# Patient Record
Sex: MALE | Race: WHITE | NOT HISPANIC OR LATINO | Employment: OTHER | URBAN - METROPOLITAN AREA
[De-identification: names, ages, dates, MRNs, and addresses within clinical notes are randomized per-mention and may not be internally consistent; named-entity substitution may affect disease eponyms.]

---

## 2017-01-05 ENCOUNTER — OUTSIDE FACILITY SERVICE (OUTPATIENT)
Dept: GASTROENTEROLOGY | Facility: CLINIC | Age: 47
End: 2017-01-05

## 2017-01-05 ENCOUNTER — LAB REQUISITION (OUTPATIENT)
Dept: LAB | Facility: HOSPITAL | Age: 47
End: 2017-01-05

## 2017-01-05 DIAGNOSIS — D12.2 BENIGN NEOPLASM OF ASCENDING COLON: ICD-10-CM

## 2017-01-05 PROCEDURE — 45385 COLONOSCOPY W/LESION REMOVAL: CPT | Performed by: INTERNAL MEDICINE

## 2017-01-05 PROCEDURE — 88305 TISSUE EXAM BY PATHOLOGIST: CPT | Performed by: INTERNAL MEDICINE

## 2017-01-06 LAB
CYTO UR: NORMAL
LAB AP CASE REPORT: NORMAL
LAB AP CLINICAL INFORMATION: NORMAL
Lab: NORMAL
PATH REPORT.FINAL DX SPEC: NORMAL
PATH REPORT.GROSS SPEC: NORMAL

## 2017-05-25 ENCOUNTER — CLINICAL SUPPORT NO REQUIREMENTS (OUTPATIENT)
Dept: CARDIOLOGY | Facility: CLINIC | Age: 47
End: 2017-05-25

## 2017-05-25 DIAGNOSIS — I42.8 NONISCHEMIC CARDIOMYOPATHY (HCC): ICD-10-CM

## 2017-05-25 PROCEDURE — 93295 DEV INTERROG REMOTE 1/2/MLT: CPT | Performed by: PHYSICIAN ASSISTANT

## 2017-05-25 PROCEDURE — 93296 REM INTERROG EVL PM/IDS: CPT | Performed by: PHYSICIAN ASSISTANT

## 2017-08-21 ENCOUNTER — OFFICE VISIT (OUTPATIENT)
Dept: CARDIOLOGY | Facility: CLINIC | Age: 47
End: 2017-08-21

## 2017-08-21 VITALS
HEIGHT: 71 IN | DIASTOLIC BLOOD PRESSURE: 78 MMHG | WEIGHT: 254.8 LBS | BODY MASS INDEX: 35.67 KG/M2 | HEART RATE: 71 BPM | SYSTOLIC BLOOD PRESSURE: 108 MMHG

## 2017-08-21 DIAGNOSIS — I42.8 NONISCHEMIC CARDIOMYOPATHY (HCC): Primary | ICD-10-CM

## 2017-08-21 PROCEDURE — 93289 INTERROG DEVICE EVAL HEART: CPT | Performed by: PHYSICIAN ASSISTANT

## 2017-08-21 RX ORDER — LEVOCETIRIZINE DIHYDROCHLORIDE 5 MG/1
5 TABLET, FILM COATED ORAL EVERY EVENING
COMMUNITY

## 2017-08-21 RX ORDER — MONTELUKAST SODIUM 10 MG/1
10 TABLET ORAL NIGHTLY
COMMUNITY

## 2017-08-21 RX ORDER — CARVEDILOL 25 MG/1
12.5 TABLET ORAL 2 TIMES DAILY WITH MEALS
COMMUNITY
End: 2020-08-11 | Stop reason: SDUPTHER

## 2017-08-21 RX ORDER — LISINOPRIL 20 MG/1
20 TABLET ORAL DAILY
COMMUNITY
End: 2018-08-06

## 2017-08-21 NOTE — PROGRESS NOTES
[facility] - Office Note  David Ravi         [address] [place] [place] [address]  1970   [phone] (home) [phone] (work)     LOCATION:  [facility].  Visit Type: Follow Up.    PCP:  Jayant Newton MD    08/21/17   David Ravi is a 47 y.o.  male  currently employed.      Chief Complaint: Follow-up on cardiomyopathy with ICD interrogation  PROBLEM LIST:  1. Nonischemic cardiomyopathy:  a.  Left heart catheterization, circa 2000 by Rohan Bautista MD, showed normal coronary arteries.  b. Echocardiogram, 08/20/2015, showed moderate global hypokinesis, ejection fraction of 25% to 30%.  c. GXT myocardial perfusion, 08/25/2015, showed dilated  LV; no reversible ischemia.   d. Left heart catheterization, 09/08/2015, Dr. Rohan Bautista: Normal coronary arteries, EF 30%. Placement of LifeVest.  2.  Class I NYHA CHF. Status post Guidant ICD implantation, November 2015.    3. Obesity, BMI 35.   4. Obstructive sleep apnea with CPAP.   5. Asthma.   6. GERD.   7. History of vertigo.      Allergies   Allergen Reactions   • Amoxicillin          Current Outpatient Prescriptions:   •  carvedilol (COREG) 25 MG tablet, Take 25 mg by mouth 2 (Two) Times a Day With Meals., Disp: , Rfl:   •  levocetirizine (XYZAL) 5 MG tablet, Take 5 mg by mouth Every Evening., Disp: , Rfl:   •  lisinopril (PRINIVIL,ZESTRIL) 20 MG tablet, Take 20 mg by mouth Daily., Disp: , Rfl:   •  montelukast (SINGULAIR) 10 MG tablet, Take 10 mg by mouth Every Night., Disp: , Rfl:     HPI  Here for device check.  Doing well overall.  Tolerating medications without side effects and compliant with follow-up with his primary care.  He has an occasional complaint of dyspnea and fatigue but he does walk on a daily basis getting at least 10,000 steps in routinely.      The following portions of the patient's history were reviewed in the chart and updated as appropriate: allergies, current medications, past  "family history, past medical history, past social history, past surgical history and problem list.    Review of Systems   Constitution: Negative for weakness and malaise/fatigue.   Cardiovascular: Positive for dyspnea on exertion.   All other systems reviewed and are negative.            height is 71\" (180.3 cm) and weight is 254 lb 12.8 oz (116 kg). His blood pressure is 108/78 and his pulse is 71.   Physical Exam   Constitutional: Vital signs are normal. He appears well-developed and well-nourished.   Cardiovascular: Normal rate, regular rhythm, S1 normal, S2 normal, normal heart sounds, intact distal pulses and normal pulses.    Pulmonary/Chest: Effort normal and breath sounds normal. He has no wheezes. He has no rhonchi. He has no rales.   Abdominal: Soft. Normal appearance and bowel sounds are normal. There is no hepatosplenomegaly.   Neurological: He is alert.         Procedures   [employer] single-chamber ICD:  Ventricular R wave 12.3, threshold 0.9, impedance 645 ohms.  Battery voltage is at 12 years.  Charge time 10.2 seconds.  One episode of VF October 13, 2016, 11 beats.  No therapy performed  Assessment/ Plan   Nonischemic cardiomyopathy:  Stable and well compensated.  Continue medications.  Normal device check.  Return to clinic 8 months with repeat [employer] interrogation.      Dee Dee Bates PA-C  8/21/2017 2:00 PM      EMR Dragon/Transcription disclaimer:   Much of this encounter note is an electronic transcription/translation of spoken language to printed text. The electronic translation of spoken language may permit erroneous, or at times, nonsensical words or phrases to be inadvertently transcribed; Although I have reviewed the note for such errors, some may still exist.      "

## 2018-01-25 ENCOUNTER — CLINICAL SUPPORT NO REQUIREMENTS (OUTPATIENT)
Dept: CARDIOLOGY | Facility: CLINIC | Age: 48
End: 2018-01-25

## 2018-01-25 DIAGNOSIS — I42.8 NONISCHEMIC CARDIOMYOPATHY (HCC): ICD-10-CM

## 2018-01-25 PROCEDURE — 93295 DEV INTERROG REMOTE 1/2/MLT: CPT | Performed by: PHYSICIAN ASSISTANT

## 2018-01-25 PROCEDURE — 93296 REM INTERROG EVL PM/IDS: CPT | Performed by: PHYSICIAN ASSISTANT

## 2018-08-06 ENCOUNTER — OFFICE VISIT (OUTPATIENT)
Dept: CARDIOLOGY | Facility: CLINIC | Age: 48
End: 2018-08-06

## 2018-08-06 VITALS
DIASTOLIC BLOOD PRESSURE: 60 MMHG | WEIGHT: 246 LBS | HEART RATE: 81 BPM | SYSTOLIC BLOOD PRESSURE: 110 MMHG | BODY MASS INDEX: 34.44 KG/M2 | HEIGHT: 71 IN

## 2018-08-06 DIAGNOSIS — I42.8 NONISCHEMIC CARDIOMYOPATHY (HCC): Primary | ICD-10-CM

## 2018-08-06 PROCEDURE — 93282 PRGRMG EVAL IMPLANTABLE DFB: CPT | Performed by: PHYSICIAN ASSISTANT

## 2018-08-06 PROCEDURE — 99213 OFFICE O/P EST LOW 20 MIN: CPT | Performed by: PHYSICIAN ASSISTANT

## 2018-08-06 NOTE — PROGRESS NOTES
[facility]   OFFICE NOTE      David Ravi  1970  PCP: Jayant Newton MD    SUBJECTIVE:   David Ravi is a 48 y.o. male seen for a follow up visit regarding the following: NICM, SHF, ICD    CC:NICM  PROBLEM LIST:  1. Nonischemic cardiomyopathy:  a.  Left heart catheterization, circa 2000 by Rohan Bautista MD, showed normal coronary arteries.  b. Echocardiogram, 08/20/2015, showed moderate global hypokinesis, ejection fraction of 25% to 30%.  c. GXT myocardial perfusion, 08/25/2015, showed dilated  LV; no reversible ischemia.   d. Left heart catheterization, 09/08/2015, Dr. Rohan Bautista: Normal coronary arteries, EF 30%. Placement of LifeVest.  2.  Class I NYHA CHF. Status post Guidant ICD implantation, November 2015.    3. Obesity, BMI 35.   4. Obstructive sleep apnea with CPAP.   5. Asthma.   6. GERD.   7. History of vertigo.  HPI:   Today I saw Mr. Abdullahi for routine follow-up regarding history of nonischemic myopathy, congestive heart failure, and [employer] ICD.  Mr. Abdullahi states that since last visit with her office he has not had any hospitalizations.  He does try to walk over 1000 feet a day.  He denies any worsening chest pain, shortness of breath, or heart failure symptoms.  He denies any palpitations, near-syncope, or sig be events, or ICD shocks.  He reports that he feels he is overall doing well.  He follows Dr. Newton on a regular basis is scheduled to see him tomorrow.        ROS:  Review of Symptoms:  General: no recent weight loss/gain, weakness or fatigue  Skin: no rashes, lumps, or other skin changes  HEENT: no dizziness, lightheadedness, or vision changes  Respiratory: no cough or hemoptysis  Cardiovascular: no palpitations, and tachycardia  Gastrointestinal: no black/tarry stools or diarrhea  Urinary: no change in frequency or urgency  Peripheral Vascular: no claudication or leg cramps  Musculoskeletal: no muscle or joint  pain/stiffness  Psychiatric: no depression or excessive stress  Neurological: no sensory or motor loss, no syncope  Hematologic: no anemia, easy bruising or bleeding  Endocrine: no thyroid problems, nor heat or cold intolerance    Cardiac PMH: (Old records have been reviewed and summarized below)      Past Medical History, Past Surgical History, Family history, Social History, and Medications were all reviewed with the patient today and updated as necessary.         Allergies   Allergen Reactions   • Amoxicillin      Patient Active Problem List   Diagnosis   • Nonischemic cardiomyopathy (CMS/HCC)   • CHF NYHA class I (CMS/HCC)   • Obesity   • IRINA on CPAP   • Asthma   • GERD (gastroesophageal reflux disease)     Past Medical History:   Diagnosis Date   • Asthma    • CHF NYHA class I (CMS/HCC)    • GERD (gastroesophageal reflux disease)    • History of vertigo    • Nonischemic cardiomyopathy (CMS/HCC)    • Obesity    • IRINA on CPAP      No past surgical history on file.  No family history on file.  Social History   Substance Use Topics   • Smoking status: Never Smoker   • Smokeless tobacco: Never Used   • Alcohol use No         PHYSICAL EXAM:    There were no vitals taken for this visit.       Wt Readings from Last 5 Encounters:   08/21/17 116 kg (254 lb 12.8 oz)       BP Readings from Last 5 Encounters:   08/21/17 108/78   02/17/16 110/70       General appearance - Alert, well appearing, and in no distress   Mental status - Affect appropriate to mood.  Eyes - Sclerae anicteric,  ENMT - Hearing grossly normal bilaterally, Dental hygiene good.  Neck - Carotids upstroke normal bilaterally, no bruits, no JVD.  Resp - Clear to auscultation, no wheezes, rales or rhonchi, symmetric air entry.  Heart - Normal rate, regular rhythm, normal S1, S2, no murmurs, rubs, clicks or gallops.  GI - Soft, nontender, nondistended, no masses or organomegaly.  Neurological - Grossly intact - normal speech, no focal findings  Musculoskeletal  - No joint tenderness, deformity or swelling, no muscular tenderness noted.  Extremities - Peripheral pulses normal, tracepedal edema, no clubbing or cyanosis.  Skin - Normal coloration and turgor.  Psych -  oriented to person, place, and time.    Medical problems and test results were reviewed with the patient today.       Device Interrogation:  Single-chamber [employer] ICD.  RV paced less than 1%.  R-wave 12 mV.  RV threshold 0.9 V at 0.5 ms.  RV impedance 535ohms.  Shock impedance 68 ohms.  Battery voltage > 12 years remaining.  Charge time 10.4 seconds.  No significant arrhythmias.      ASSESSMENT   1. NICM: Continue BB, Entresto  2. Chronic SHF:   Monitor daily weights, fluid intake.  3. BSC ICD: Normal function.     PLAN  · Continue medical therapy  · Return for follow up in one year or sooner as needed.     8/6/2018  10:15 AM    Will Jose COLLAZO

## 2018-10-04 ENCOUNTER — CLINICAL SUPPORT NO REQUIREMENTS (OUTPATIENT)
Dept: CARDIOLOGY | Facility: CLINIC | Age: 48
End: 2018-10-04

## 2018-10-04 DIAGNOSIS — I42.8 NONISCHEMIC CARDIOMYOPATHY (HCC): ICD-10-CM

## 2018-10-04 PROCEDURE — 93296 REM INTERROG EVL PM/IDS: CPT | Performed by: INTERNAL MEDICINE

## 2018-10-04 PROCEDURE — 93295 DEV INTERROG REMOTE 1/2/MLT: CPT | Performed by: INTERNAL MEDICINE

## 2019-01-03 ENCOUNTER — CLINICAL SUPPORT NO REQUIREMENTS (OUTPATIENT)
Dept: CARDIOLOGY | Facility: CLINIC | Age: 49
End: 2019-01-03

## 2019-01-03 DIAGNOSIS — I42.8 NONISCHEMIC CARDIOMYOPATHY (HCC): ICD-10-CM

## 2019-01-03 PROCEDURE — 93295 DEV INTERROG REMOTE 1/2/MLT: CPT | Performed by: INTERNAL MEDICINE

## 2019-01-03 PROCEDURE — 93296 REM INTERROG EVL PM/IDS: CPT | Performed by: INTERNAL MEDICINE

## 2019-04-15 ENCOUNTER — CLINICAL SUPPORT NO REQUIREMENTS (OUTPATIENT)
Dept: CARDIOLOGY | Facility: CLINIC | Age: 49
End: 2019-04-15

## 2019-04-15 DIAGNOSIS — I42.8 NONISCHEMIC CARDIOMYOPATHY (HCC): ICD-10-CM

## 2019-04-15 PROCEDURE — 93295 DEV INTERROG REMOTE 1/2/MLT: CPT | Performed by: INTERNAL MEDICINE

## 2019-04-15 PROCEDURE — 93296 REM INTERROG EVL PM/IDS: CPT | Performed by: INTERNAL MEDICINE

## 2019-07-17 ENCOUNTER — CLINICAL SUPPORT NO REQUIREMENTS (OUTPATIENT)
Dept: CARDIOLOGY | Facility: CLINIC | Age: 49
End: 2019-07-17

## 2019-07-17 DIAGNOSIS — I42.8 NONISCHEMIC CARDIOMYOPATHY (HCC): Primary | ICD-10-CM

## 2019-07-17 PROCEDURE — 93296 REM INTERROG EVL PM/IDS: CPT | Performed by: INTERNAL MEDICINE

## 2019-07-17 PROCEDURE — 93295 DEV INTERROG REMOTE 1/2/MLT: CPT | Performed by: INTERNAL MEDICINE

## 2019-08-06 ENCOUNTER — OFFICE VISIT (OUTPATIENT)
Dept: CARDIOLOGY | Facility: CLINIC | Age: 49
End: 2019-08-06

## 2019-08-06 VITALS
WEIGHT: 239 LBS | HEART RATE: 65 BPM | OXYGEN SATURATION: 96 % | SYSTOLIC BLOOD PRESSURE: 110 MMHG | DIASTOLIC BLOOD PRESSURE: 60 MMHG | HEIGHT: 71 IN | BODY MASS INDEX: 33.46 KG/M2

## 2019-08-06 DIAGNOSIS — Z99.89 OSA ON CPAP: ICD-10-CM

## 2019-08-06 DIAGNOSIS — R41.3 MEMORY LOSS: Primary | ICD-10-CM

## 2019-08-06 DIAGNOSIS — I50.20 SYSTOLIC CONGESTIVE HEART FAILURE, NYHA CLASS 1, UNSPECIFIED CONGESTIVE HEART FAILURE CHRONICITY (HCC): ICD-10-CM

## 2019-08-06 DIAGNOSIS — I42.8 NONISCHEMIC CARDIOMYOPATHY (HCC): ICD-10-CM

## 2019-08-06 DIAGNOSIS — E66.9 CLASS 1 OBESITY WITH SERIOUS COMORBIDITY AND BODY MASS INDEX (BMI) OF 30.0 TO 30.9 IN ADULT, UNSPECIFIED OBESITY TYPE: ICD-10-CM

## 2019-08-06 DIAGNOSIS — G47.33 OSA ON CPAP: ICD-10-CM

## 2019-08-06 PROCEDURE — 99213 OFFICE O/P EST LOW 20 MIN: CPT | Performed by: NURSE PRACTITIONER

## 2019-08-06 PROCEDURE — 93282 PRGRMG EVAL IMPLANTABLE DFB: CPT | Performed by: NURSE PRACTITIONER

## 2019-08-06 RX ORDER — SACUBITRIL AND VALSARTAN 97; 103 MG/1; MG/1
1 TABLET, FILM COATED ORAL 2 TIMES DAILY
Refills: 0 | COMMUNITY
Start: 2019-05-17

## 2019-08-06 NOTE — PROGRESS NOTES
[facility]   OFFICE NOTE      David Ravi  1970  PCP: Jayant Newton MD    SUBJECTIVE:   David Ravi is a 49 y.o. male seen for a follow up visit regarding the following: NICM, SHF, ICD    CC:NICM  PROBLEM LIST:  1. Nonischemic cardiomyopathy:  a.  Left heart catheterization, circa 2000 by Rohan Bautista MD, showed normal coronary arteries.  b. Echocardiogram, 08/20/2015, showed moderate global hypokinesis, ejection fraction of 25% to 30%.  c. GXT myocardial perfusion, 08/25/2015, showed dilated  LV; no reversible ischemia.   d. Left heart catheterization, 09/08/2015, Dr. Rohan Bautista: Normal coronary arteries, EF 30%. Placement of LifeVest.  2.  Class I NYHA CHF. Status post Guidant ICD implantation, November 2015.    3. Obesity, BMI 35.   4. Obstructive sleep apnea with CPAP.   5. Asthma.   6. GERD.   7. History of vertigo.  HPI:   Mr. Abdullahi presents for routine follow-up regarding history of nonischemic cardiomyopathy, congestive heart failure, and [employer] ICD.  EF 30-35% by most recent echo around 2018. He states that he has been doing well. He reports having memory loss. Mostly short-term. No other neurologic symptoms. Requests referral to neuro.     ROS:  Denies CP, SOB, PND, YU, orthopnea, fevers, chills, night sweats, LH, dizziness.    Cardiac PMH: (Old records have been reviewed and summarized below)      Past Medical History, Past Surgical History, Family history, Social History, and Medications were all reviewed with the patient today and updated as necessary.         Allergies   Allergen Reactions   • Amoxicillin      Patient Active Problem List   Diagnosis   • Nonischemic cardiomyopathy (CMS/HCC)   • CHF NYHA class I (CMS/HCC)   • Obesity   • IRINA on CPAP   • Asthma   • GERD (gastroesophageal reflux disease)     Past Medical History:   Diagnosis Date   • Asthma    • CHF NYHA class I (CMS/HCC)    • GERD (gastroesophageal reflux  "disease)    • History of vertigo    • Nonischemic cardiomyopathy (CMS/HCC)    • Obesity    • IRINA on CPAP      History reviewed. No pertinent surgical history.  Family History   Problem Relation Age of Onset   • Heart attack Father      Social History     Tobacco Use   • Smoking status: Never Smoker   • Smokeless tobacco: Never Used   Substance Use Topics   • Alcohol use: No         PHYSICAL EXAM:    /60 (BP Location: Left arm, Patient Position: Sitting)   Pulse 65   Ht 180.3 cm (71\")   Wt 108 kg (239 lb)   SpO2 96%   BMI 33.33 kg/m²        Wt Readings from Last 5 Encounters:   08/06/19 108 kg (239 lb)   08/06/18 112 kg (246 lb)   08/21/17 116 kg (254 lb 12.8 oz)       BP Readings from Last 5 Encounters:   08/06/19 110/60   08/06/18 110/60   08/21/17 108/78   02/17/16 110/70       General appearance - Alert, well appearing, and in no distress   Mental status - Affect appropriate to mood.  Eyes - Sclerae anicteric,  ENMT - Hearing grossly normal bilaterally, Dental hygiene good.  Neck - Carotids upstroke normal bilaterally, no bruits, no JVD.  Resp - Clear to auscultation, no wheezes, rales or rhonchi, symmetric air entry.  Heart - Normal rate, regular rhythm, normal S1, S2, no murmurs, rubs, clicks or gallops.  GI - Soft, nontender, nondistended, no masses or organomegaly.  Neurological - Grossly intact - normal speech, no focal findings  Musculoskeletal - No joint tenderness, deformity or swelling, no muscular tenderness noted.  Extremities - Peripheral pulses normal, tracepedal edema, no clubbing or cyanosis.  Skin - Normal coloration and turgor.  Psych -  oriented to person, place, and time.    Medical problems and test results were reviewed with the patient today.       Device Interrogation:  Single-chamber [employer] ICD: RV <1%, normal threshold and impedance, battery life 12 years, no events.     ASSESSMENT   1. NICM: Continue BB, Entresto  2. Chronic SHF:   Monitor daily weights, fluid " intake.  3. BSC ICD: Normal function.   4. Memory loss    PLAN  · Continue medical therapy  · Return for follow up in one year or sooner as needed.  · Keep scheduled follow up with Dr. Newton.   · Referral to ambulatory neurology     CC: Dr. Newton     Electronically signed by GAGE Lai, 08/06/19, 2:17 PM.

## 2019-10-10 ENCOUNTER — TELEPHONE (OUTPATIENT)
Dept: NEUROLOGY | Facility: CLINIC | Age: 49
End: 2019-10-10

## 2019-10-10 NOTE — TELEPHONE ENCOUNTER
Pt wife Arabella called to verify and confirm pt scheduled follow-up appt tomorrow at 3:30 with Dr. Valdes. Thanks.

## 2019-10-11 ENCOUNTER — APPOINTMENT (OUTPATIENT)
Dept: LAB | Facility: HOSPITAL | Age: 49
End: 2019-10-11

## 2019-10-11 ENCOUNTER — OFFICE VISIT (OUTPATIENT)
Dept: NEUROLOGY | Facility: CLINIC | Age: 49
End: 2019-10-11

## 2019-10-11 VITALS
SYSTOLIC BLOOD PRESSURE: 102 MMHG | HEIGHT: 71 IN | WEIGHT: 242 LBS | BODY MASS INDEX: 33.88 KG/M2 | HEART RATE: 91 BPM | DIASTOLIC BLOOD PRESSURE: 70 MMHG | OXYGEN SATURATION: 95 %

## 2019-10-11 DIAGNOSIS — R41.3 MEMORY LOSS: Primary | ICD-10-CM

## 2019-10-11 LAB — AMMONIA BLD-SCNC: 64 UMOL/L (ref 16–60)

## 2019-10-11 PROCEDURE — 83825 ASSAY OF MERCURY: CPT | Performed by: PSYCHIATRY & NEUROLOGY

## 2019-10-11 PROCEDURE — 99204 OFFICE O/P NEW MOD 45 MIN: CPT | Performed by: PSYCHIATRY & NEUROLOGY

## 2019-10-11 PROCEDURE — 80053 COMPREHEN METABOLIC PANEL: CPT | Performed by: PSYCHIATRY & NEUROLOGY

## 2019-10-11 PROCEDURE — 85027 COMPLETE CBC AUTOMATED: CPT | Performed by: PSYCHIATRY & NEUROLOGY

## 2019-10-11 PROCEDURE — 82746 ASSAY OF FOLIC ACID SERUM: CPT | Performed by: PSYCHIATRY & NEUROLOGY

## 2019-10-11 PROCEDURE — 82607 VITAMIN B-12: CPT | Performed by: PSYCHIATRY & NEUROLOGY

## 2019-10-11 PROCEDURE — 82140 ASSAY OF AMMONIA: CPT | Performed by: PSYCHIATRY & NEUROLOGY

## 2019-10-11 PROCEDURE — 83655 ASSAY OF LEAD: CPT | Performed by: PSYCHIATRY & NEUROLOGY

## 2019-10-11 PROCEDURE — G0432 EIA HIV-1/HIV-2 SCREEN: HCPCS | Performed by: PSYCHIATRY & NEUROLOGY

## 2019-10-11 PROCEDURE — 84443 ASSAY THYROID STIM HORMONE: CPT | Performed by: PSYCHIATRY & NEUROLOGY

## 2019-10-11 PROCEDURE — 86618 LYME DISEASE ANTIBODY: CPT | Performed by: PSYCHIATRY & NEUROLOGY

## 2019-10-11 PROCEDURE — 86592 SYPHILIS TEST NON-TREP QUAL: CPT | Performed by: PSYCHIATRY & NEUROLOGY

## 2019-10-11 PROCEDURE — 36415 COLL VENOUS BLD VENIPUNCTURE: CPT | Performed by: PSYCHIATRY & NEUROLOGY

## 2019-10-11 PROCEDURE — 82175 ASSAY OF ARSENIC: CPT | Performed by: PSYCHIATRY & NEUROLOGY

## 2019-10-11 RX ORDER — LORATADINE 10 MG/1
650 TABLET ORAL 2 TIMES DAILY PRN
Refills: 0 | COMMUNITY
Start: 2019-09-23 | End: 2021-03-02

## 2019-10-11 RX ORDER — CYCLOBENZAPRINE HCL 10 MG
10 TABLET ORAL AS NEEDED
Refills: 0 | COMMUNITY
Start: 2019-09-23 | End: 2021-12-21

## 2019-10-11 NOTE — PROGRESS NOTES
"Subjective     CC: Memory Loss    History of Present Illness   David Ravi is a 49 y.o. male who comes to clinic today for evaluation of memory impairment. He has had symptoms since approximately 2014. He initially noted difficulty recalling previously learned information. Over time he has also noted impairments in recent memory. He has had associated impairments in orientation, attention and language. He feels that his symptoms worsened significantly after starting Entresto in approximately 2017. There are not other identified exacerbating or alleviating factors.    He is on CPAP for IRINA.    It is also noted that he is on disability due to cardiomyopathy. His LVEF=30% on 9/9/15 (by cardiac cath).    I have reviewed and confirmed the past family, social and medical history as accurate on 10/11/19.     Review of Systems   Constitutional: Positive for fatigue.   Respiratory: Positive for shortness of breath.    Cardiovascular: Negative.    Gastrointestinal: Negative.    Genitourinary: Negative.    Musculoskeletal: Negative.    Psychiatric/Behavioral: Negative for dysphoric mood.   All other systems reviewed and are negative.      Objective   General appearance today is normal.   Peripheral pulses were present and symmetric.   The ophthalmoscopic exam today is unremarkable. The discs and posterior elements are unremarkable.    /70   Pulse 91   Ht 180.3 cm (71\")   Wt 110 kg (242 lb)   SpO2 95%   BMI 33.75 kg/m²     Physical Exam   Constitutional: He is oriented to person, place, and time.   Neurological: He is oriented to person, place, and time. He has normal strength. He has a normal Finger-Nose-Finger Test. Gait normal.   Reflex Scores:       Tricep reflexes are 1+ on the right side and 1+ on the left side.       Bicep reflexes are 1+ on the right side and 1+ on the left side.       Brachioradialis reflexes are 1+ on the right side and 1+ on the left side.  Psychiatric: His speech is normal.      "   Neurologic Exam     Mental Status   Oriented to person, place, and time.   Registration: recalls 3 of 3 objects. Recall at 5 minutes: recalls 3 of 3 objects. Follows 3 step commands.   Attention: normal.   Speech: speech is normal   Level of consciousness: alert  Knowledge: good.   Able to name object. Able to read. Able to repeat. Able to write. Normal comprehension.     Cranial Nerves   Cranial nerves II through XII intact.     Motor Exam   Muscle bulk: normal  Overall muscle tone: normal    Strength   Strength 5/5 throughout.     Sensory Exam   Light touch normal.     Gait, Coordination, and Reflexes     Gait  Gait: normal    Coordination   Finger to nose coordination: normal    Reflexes   Right brachioradialis: 1+  Left brachioradialis: 1+  Right biceps: 1+  Left biceps: 1+  Right triceps: 1+  Left triceps: 1+      MMSE=30  MoCA=29 (4/5 free recall, 5/5 cue recall)      Assessment/Plan   David was seen today for memory loss.    Diagnoses and all orders for this visit:    Memory loss  -     Ammonia  -     CBC (No Diff)  -     B. Burgdorferi Antibodies, WB Reflex  -     Comprehensive Metabolic Panel  -     CT Head Without Contrast  -     Vitamin B12  -     TSH  -     RPR  -     HIV-1 / O / 2 Ag / Antibody 4th Generation  -     Heavy Metals, Blood  -     Folate          DISCUSSION/SUMMARY    David Ravi comes to clinic today for evaluation of memory impairment. His history and examination, including bedside cognitive testing are reassuring, and it is possible that his symptoms are related to his other comorbid medical conditions which I discussed. MCI is possible, though his bedside testing does also make this less likely. For now it was elected to obtain screening blood work  and a head CT . If these are normal, then I did discuss neuropsychological testing, which he will consider.    As part of this visit I obtained additional history from the family which is incorporated in the HPI. Please see above for  additional details.

## 2019-10-12 LAB
ALBUMIN SERPL-MCNC: 4.8 G/DL (ref 3.5–5.2)
ALBUMIN/GLOB SERPL: 1.8 G/DL
ALP SERPL-CCNC: 69 U/L (ref 39–117)
ALT SERPL W P-5'-P-CCNC: 28 U/L (ref 1–41)
ANION GAP SERPL CALCULATED.3IONS-SCNC: 12.3 MMOL/L (ref 5–15)
AST SERPL-CCNC: 25 U/L (ref 1–40)
BILIRUB SERPL-MCNC: 0.7 MG/DL (ref 0.2–1.2)
BUN BLD-MCNC: 9 MG/DL (ref 6–20)
BUN/CREAT SERPL: 10 (ref 7–25)
CALCIUM SPEC-SCNC: 9.2 MG/DL (ref 8.6–10.5)
CHLORIDE SERPL-SCNC: 99 MMOL/L (ref 98–107)
CO2 SERPL-SCNC: 27.7 MMOL/L (ref 22–29)
CREAT BLD-MCNC: 0.9 MG/DL (ref 0.76–1.27)
DEPRECATED RDW RBC AUTO: 40.1 FL (ref 37–54)
ERYTHROCYTE [DISTWIDTH] IN BLOOD BY AUTOMATED COUNT: 12.7 % (ref 12.3–15.4)
FOLATE SERPL-MCNC: 9.89 NG/ML (ref 4.78–24.2)
GFR SERPL CREATININE-BSD FRML MDRD: 90 ML/MIN/1.73
GLOBULIN UR ELPH-MCNC: 2.6 GM/DL
GLUCOSE BLD-MCNC: 108 MG/DL (ref 65–99)
HCT VFR BLD AUTO: 49.7 % (ref 37.5–51)
HGB BLD-MCNC: 16.5 G/DL (ref 13–17.7)
HIV1+2 AB SER QL: NORMAL
MCH RBC QN AUTO: 28.8 PG (ref 26.6–33)
MCHC RBC AUTO-ENTMCNC: 33.2 G/DL (ref 31.5–35.7)
MCV RBC AUTO: 86.9 FL (ref 79–97)
PLATELET # BLD AUTO: 243 10*3/MM3 (ref 140–450)
PMV BLD AUTO: 11 FL (ref 6–12)
POTASSIUM BLD-SCNC: 4 MMOL/L (ref 3.5–5.2)
PROT SERPL-MCNC: 7.4 G/DL (ref 6–8.5)
RBC # BLD AUTO: 5.72 10*6/MM3 (ref 4.14–5.8)
RPR SER QL: NORMAL
SODIUM BLD-SCNC: 139 MMOL/L (ref 136–145)
TSH SERPL DL<=0.05 MIU/L-ACNC: 0.58 UIU/ML (ref 0.27–4.2)
VIT B12 BLD-MCNC: 491 PG/ML (ref 211–946)
WBC NRBC COR # BLD: 7.93 10*3/MM3 (ref 3.4–10.8)

## 2019-10-14 LAB
B BURGDOR IGG+IGM SER-ACNC: <0.91 ISR (ref 0–0.9)
B BURGDOR IGM SER-ACNC: <0.8 INDEX (ref 0–0.79)

## 2019-10-15 LAB
ARSENIC BLD-MCNC: 5 UG/L (ref 2–23)
LEAD BLD-MCNC: NORMAL UG/DL (ref 0–4)
MERCURY BLD-MCNC: 1.5 UG/L (ref 0–14.9)

## 2019-10-23 ENCOUNTER — CLINICAL SUPPORT NO REQUIREMENTS (OUTPATIENT)
Dept: CARDIOLOGY | Facility: CLINIC | Age: 49
End: 2019-10-23

## 2019-10-23 DIAGNOSIS — I42.8 NONISCHEMIC CARDIOMYOPATHY (HCC): ICD-10-CM

## 2019-10-23 PROCEDURE — 93296 REM INTERROG EVL PM/IDS: CPT | Performed by: INTERNAL MEDICINE

## 2019-10-23 PROCEDURE — 93295 DEV INTERROG REMOTE 1/2/MLT: CPT | Performed by: INTERNAL MEDICINE

## 2019-10-28 ENCOUNTER — HOSPITAL ENCOUNTER (OUTPATIENT)
Dept: CT IMAGING | Facility: HOSPITAL | Age: 49
Discharge: HOME | End: 2019-10-28
Admitting: PSYCHIATRY & NEUROLOGY

## 2019-10-28 PROCEDURE — 70450 CT HEAD/BRAIN W/O DYE: CPT

## 2020-01-30 ENCOUNTER — CLINICAL SUPPORT NO REQUIREMENTS (OUTPATIENT)
Dept: CARDIOLOGY | Facility: CLINIC | Age: 50
End: 2020-01-30

## 2020-01-30 DIAGNOSIS — I42.8 NONISCHEMIC CARDIOMYOPATHY (HCC): Primary | ICD-10-CM

## 2020-01-30 DIAGNOSIS — I50.20 SYSTOLIC CONGESTIVE HEART FAILURE, NYHA CLASS 1, UNSPECIFIED CONGESTIVE HEART FAILURE CHRONICITY (HCC): ICD-10-CM

## 2020-01-30 PROCEDURE — 93295 DEV INTERROG REMOTE 1/2/MLT: CPT | Performed by: INTERNAL MEDICINE

## 2020-01-30 PROCEDURE — 93296 REM INTERROG EVL PM/IDS: CPT | Performed by: INTERNAL MEDICINE

## 2020-08-11 ENCOUNTER — OFFICE VISIT (OUTPATIENT)
Dept: CARDIOLOGY | Facility: CLINIC | Age: 50
End: 2020-08-11

## 2020-08-11 VITALS
HEART RATE: 83 BPM | DIASTOLIC BLOOD PRESSURE: 64 MMHG | WEIGHT: 246 LBS | HEIGHT: 71 IN | OXYGEN SATURATION: 96 % | SYSTOLIC BLOOD PRESSURE: 100 MMHG | BODY MASS INDEX: 34.44 KG/M2

## 2020-08-11 DIAGNOSIS — I42.8 NONISCHEMIC CARDIOMYOPATHY (HCC): Primary | ICD-10-CM

## 2020-08-11 DIAGNOSIS — I50.20 SYSTOLIC CONGESTIVE HEART FAILURE, NYHA CLASS 1, UNSPECIFIED CONGESTIVE HEART FAILURE CHRONICITY (HCC): ICD-10-CM

## 2020-08-11 PROCEDURE — 99214 OFFICE O/P EST MOD 30 MIN: CPT | Performed by: PHYSICIAN ASSISTANT

## 2020-08-11 PROCEDURE — 93282 PRGRMG EVAL IMPLANTABLE DFB: CPT | Performed by: PHYSICIAN ASSISTANT

## 2020-08-11 RX ORDER — OMEPRAZOLE 20 MG/1
20 CAPSULE, DELAYED RELEASE ORAL AS NEEDED
COMMUNITY

## 2020-08-11 RX ORDER — CARVEDILOL 12.5 MG/1
12.5 TABLET ORAL 2 TIMES DAILY WITH MEALS
Qty: 60 TABLET | Refills: 5 | Status: SHIPPED | OUTPATIENT
Start: 2020-08-11 | End: 2021-09-13

## 2020-08-11 NOTE — PROGRESS NOTES
David Ravi  1970  PCP: Jayant Newton MD    SUBJECTIVE:   David Ravi is a 50 y.o. male seen for a follow up visit regarding the following:     Chief Complaint: Follow up for CHF, NICM, ICD     HPI:    Mr. Ravi is a 50 year old male with a history of NICM s/p [facility] ICD that presents today for follow-up. He has been stable with no anginal or heart failure symptoms. He notes some pain around his device that has been present since it was implanted. No swelling, erythema, or drainage. It is mild and only bothers him occasionally. He does report having some dizziness frequently. No exertional cp, WILSON above his baseline, edema.     PROBLEM LIST:  1. Nonischemic cardiomyopathy:  a.  Left heart catheterization, circa 2000 by Rohan Bautista MD, showed normal coronary arteries.  b. Echocardiogram, 08/20/2015, showed moderate global hypokinesis, ejection fraction of 25% to 30%.  c. GXT myocardial perfusion, 08/25/2015, showed dilated  LV; no reversible ischemia.   d. Left heart catheterization, 09/08/2015, Dr. Rohan Bautista: Normal coronary arteries, EF 30%. Placement of LifeVest.  2.  Class I NYHA CHF. Status post Guidant ICD implantation, November 2015.    3. Obesity, BMI 35.   4. Obstructive sleep apnea with CPAP.   5. Asthma.   6. GERD.   7. History of vertigo.    Current Outpatient Medications:   •  carvedilol (COREG) 12.5 MG tablet, Take 1 tablet by mouth 2 (Two) Times a Day With Meals., Disp: 60 tablet, Rfl: 5  •  cyclobenzaprine (FLEXERIL) 10 MG tablet, Take 10 mg by mouth every night at bedtime., Disp: , Rfl: 0  •  ENTRESTO  MG tablet, Take 1 tablet by mouth 2 (Two) Times a Day., Disp: , Rfl: 0  •  levocetirizine (XYZAL) 5 MG tablet, Take 5 mg by mouth Every Evening., Disp: , Rfl:   •  montelukast (SINGULAIR) 10 MG tablet, Take 10 mg by mouth Every Night., Disp: , Rfl:   •  omeprazole (priLOSEC) 20 MG capsule, Take 20 mg by mouth Daily., Disp: , Rfl:   •  Oxymetazoline HCl (NASAL  "SPRAY NA), into the nostril(s) as directed by provider Daily., Disp: , Rfl:   •  RA ARTHRITIS PAIN RELIEF 650 MG 8 hr tablet, Take 650 mg by mouth 2 (Two) Times a Day As Needed. for pain, Disp: , Rfl: 0    Past Medical History, Past Surgical History, Family history, Social History, and Medications were all reviewed with the patient today and updated as necessary.       Patient Active Problem List   Diagnosis   • Nonischemic cardiomyopathy (CMS/HCC)   • CHF NYHA class I (CMS/HCC)   • Obesity   • IRINA on CPAP   • Asthma   • GERD (gastroesophageal reflux disease)   • Memory loss     Allergies   Allergen Reactions   • Amoxicillin      Past Medical History:   Diagnosis Date   • Asthma    • CHF NYHA class I (CMS/HCC)    • GERD (gastroesophageal reflux disease)    • Heart disease    • History of vertigo    • Hyperlipidemia    • Nonischemic cardiomyopathy (CMS/HCC)    • Obesity    • IRINA on CPAP      History reviewed. No pertinent surgical history.  Family History   Problem Relation Age of Onset   • Heart attack Father    • Alcohol abuse Paternal Uncle    • Alcohol abuse Maternal Grandfather    • Alzheimer's disease Paternal Grandmother      Social History     Tobacco Use   • Smoking status: Never Smoker   • Smokeless tobacco: Never Used   Substance Use Topics   • Alcohol use: No         PHYSICAL EXAM:    /64 (BP Location: Left arm, Patient Position: Sitting)   Pulse 83   Ht 180.3 cm (71\")   Wt 112 kg (246 lb)   SpO2 96%   BMI 34.31 kg/m²        Wt Readings from Last 5 Encounters:   08/11/20 112 kg (246 lb)   10/11/19 110 kg (242 lb)   08/06/19 108 kg (239 lb)   08/06/18 112 kg (246 lb)   08/21/17 116 kg (254 lb 12.8 oz)       BP Readings from Last 5 Encounters:   08/11/20 100/64   10/11/19 102/70   08/06/19 110/60   08/06/18 110/60   08/21/17 108/78       General-Well Nourished, Well developed  Eyes - PERRLA  Neck- supple, No mass  CV- regular rate and rhythm, no MRG, No edema  Lung- clear bilaterally  Abd- soft, " +BS  Musc/skel - Norm strength and range of motion  Skin- warm and dry  Neuro - Alert & Oriented x 3, appropriate mood.        Medical problems and test results were reviewed with the patient today.     No results found for this or any previous visit (from the past 672 hour(s)).      EKG: (EKG has been independently visualized by me and summarized below)    Procedures     ASSESSMENT and PLAN    1. NICM: s/p VVI ICD. Stable and well compensated. Continue Coreg, Entresto  2. Chronic SHF: Stable FC I-II; Continue optimal rx w/ BB, Entresto. Monitor daily weights, fluid intake.  3. BSC ICD: Normal function. stable threshold and impedence. No events.   4. Hypotension: systolic readings frequently in the 90s; symptomatic with dizziness and fatigue. Will decrease Coreg to 12.5mg BID. He will monitor BP at home       Return in about 1 year (around 8/11/2021) for BSC.        Andra Marina PA-C   Cardiology/Electrophysiology  8/11/2020  13:44

## 2021-03-02 ENCOUNTER — OFFICE VISIT (OUTPATIENT)
Dept: CARDIOLOGY | Facility: CLINIC | Age: 51
End: 2021-03-02

## 2021-03-02 VITALS
SYSTOLIC BLOOD PRESSURE: 114 MMHG | HEART RATE: 84 BPM | DIASTOLIC BLOOD PRESSURE: 64 MMHG | HEIGHT: 71 IN | BODY MASS INDEX: 34.16 KG/M2 | OXYGEN SATURATION: 96 % | WEIGHT: 244 LBS

## 2021-03-02 DIAGNOSIS — I50.20 SYSTOLIC CONGESTIVE HEART FAILURE, NYHA CLASS 1, UNSPECIFIED CONGESTIVE HEART FAILURE CHRONICITY (HCC): ICD-10-CM

## 2021-03-02 DIAGNOSIS — I42.8 NONISCHEMIC CARDIOMYOPATHY (HCC): Primary | ICD-10-CM

## 2021-03-02 PROCEDURE — 93283 PRGRMG EVAL IMPLANTABLE DFB: CPT | Performed by: PHYSICIAN ASSISTANT

## 2021-03-02 PROCEDURE — 99213 OFFICE O/P EST LOW 20 MIN: CPT | Performed by: PHYSICIAN ASSISTANT

## 2021-03-02 RX ORDER — NITROGLYCERIN 0.4 MG/1
0.4 TABLET SUBLINGUAL AS NEEDED
COMMUNITY
Start: 2020-11-30

## 2021-03-02 RX ORDER — FUROSEMIDE 20 MG/1
20 TABLET ORAL DAILY
COMMUNITY
Start: 2021-01-04

## 2021-03-02 RX ORDER — ALBUTEROL SULFATE 90 UG/1
1 AEROSOL, METERED RESPIRATORY (INHALATION) AS NEEDED
COMMUNITY
Start: 2021-02-25

## 2021-03-02 NOTE — PROGRESS NOTES
David Ravi  1970  PCP: Jayant Newton MD    SUBJECTIVE:   David Ravi is a 50 y.o. male seen for a follow up visit regarding the following:     Chief Complaint: Follow up for NICM, VVI ICD check     HPI:    Mr. Ravi is a 50 year old male with a history of NICM s/p [facility] [place] that presents today for follow-up. He has been stable with no anginal or heart failure symptoms. He denies any chest pain, sob, edema, palps. He notes occasional dizziness with standing       PROBLEM LIST:  1. Nonischemic cardiomyopathy:  a.  Left heart catheterization, circa 2000 by Rohan Bautista MD, showed normal coronary arteries.  b. Echocardiogram, 08/20/2015, showed moderate global hypokinesis, ejection fraction of 25% to 30%.  c. GXT myocardial perfusion, 08/25/2015, showed dilated  LV; no reversible ischemia.   d. Left heart catheterization, 09/08/2015, Dr. Rohan Bautista: Normal coronary arteries, EF 30%. Placement of LifeVest.  2.  Class I NYHA CHF. Status post Guidant ICD implantation, November 2015.    3. Obesity, BMI 35.   4. Obstructive sleep apnea with CPAP.   5. Asthma.   6. GERD.   7. History of vertigo.      Current Outpatient Medications:   •  Acetaminophen (TYLENOL ARTHRITIS PAIN PO), Take 2 tablets by mouth As Needed., Disp: , Rfl:   •  albuterol sulfate  (90 Base) MCG/ACT inhaler, Daily., Disp: , Rfl:   •  ALLERGY SERUM INJECTION, Inject  under the skin into the appropriate area as directed 1 (One) Time. I shot weekly, Disp: , Rfl:   •  Anoro Ellipta 62.5-25 MCG/INH aerosol powder  inhaler, Inhale 1 puff Daily., Disp: , Rfl:   •  carvedilol (COREG) 12.5 MG tablet, Take 1 tablet by mouth 2 (Two) Times a Day With Meals., Disp: 60 tablet, Rfl: 5  •  cyclobenzaprine (FLEXERIL) 10 MG tablet, Take 10 mg by mouth As Needed., Disp: , Rfl: 0  •  ENTRESTO  MG tablet, Take 1 tablet by mouth 2 (Two) Times a Day., Disp: , Rfl: 0  •  furosemide (LASIX) 20 MG tablet, Take 20 mg by mouth Daily.,  "Disp: , Rfl:   •  levocetirizine (XYZAL) 5 MG tablet, Take 5 mg by mouth Every Evening., Disp: , Rfl:   •  montelukast (SINGULAIR) 10 MG tablet, Take 10 mg by mouth Every Night., Disp: , Rfl:   •  nitroglycerin (NITROSTAT) 0.4 MG SL tablet, As Needed., Disp: , Rfl:   •  omeprazole (priLOSEC) 20 MG capsule, Take 20 mg by mouth Daily., Disp: , Rfl:   •  Oxymetazoline HCl (NASAL SPRAY NA), into the nostril(s) as directed by provider Daily., Disp: , Rfl:     Past Medical History, Past Surgical History, Family history, Social History, and Medications were all reviewed with the patient today and updated as necessary.       Patient Active Problem List   Diagnosis   • Nonischemic cardiomyopathy (CMS/HCC)   • CHF NYHA class I (CMS/HCC)   • Obesity   • IRINA on CPAP   • Asthma   • GERD (gastroesophageal reflux disease)   • Memory loss     Allergies   Allergen Reactions   • Amoxicillin      Past Medical History:   Diagnosis Date   • Asthma    • CHF NYHA class I (CMS/HCC)    • GERD (gastroesophageal reflux disease)    • Heart disease    • History of vertigo    • Hyperlipidemia    • Nonischemic cardiomyopathy (CMS/HCC)    • Obesity    • IRINA on CPAP      History reviewed. No pertinent surgical history.  Family History   Problem Relation Age of Onset   • Heart attack Father    • Alcohol abuse Paternal Uncle    • Alcohol abuse Maternal Grandfather    • Alzheimer's disease Paternal Grandmother      Social History     Tobacco Use   • Smoking status: Never Smoker   • Smokeless tobacco: Never Used   Substance Use Topics   • Alcohol use: No         PHYSICAL EXAM:    /64 (BP Location: Left arm, Patient Position: Sitting)   Pulse 84   Ht 180.3 cm (71\")   Wt 111 kg (244 lb)   SpO2 96%   BMI 34.03 kg/m²        Wt Readings from Last 5 Encounters:   03/02/21 111 kg (244 lb)   08/11/20 112 kg (246 lb)   10/11/19 110 kg (242 lb)   08/06/19 108 kg (239 lb)   08/06/18 112 kg (246 lb)       BP Readings from Last 5 Encounters:   03/02/21 " 114/64   08/11/20 100/64   10/11/19 102/70   08/06/19 110/60   08/06/18 110/60       General-Well Nourished, Well developed  Eyes - PERRLA  Neck- supple, No mass  CV- regular rate and rhythm, no MRG, No edema  Lung- clear bilaterally  Abd- soft, +BS  Musc/skel - Norm strength and range of motion  Skin- warm and dry  Neuro - Alert & Oriented x 3, appropriate mood.        Medical problems and test results were reviewed with the patient today.     No results found for this or any previous visit (from the past 672 hour(s)).      EKG: (EKG has been independently visualized by me and summarized below)    Procedures     ASSESSMENT and PLAN    1. NICM: s/p VVI ICD. Stable and well compensated. Continue optimal rx.   2. Chronic SHF: Stable FC I-II; Continue optimal rx w/ Coreg and Entresto. Monitor daily weights, fluid intake. Most recent EF 20%. Follows with Dr. Newton in [place]   3. BSC ICD: Normal function. stable threshold and impedence. 1NSVT. Battery 12 years.         Return in about 1 year (around 3/2/2022) for BSC.        Andra Marina PA-C   Cardiology/Electrophysiology  3/2/2021  15:55 EST

## 2021-05-09 PROCEDURE — 93295 DEV INTERROG REMOTE 1/2/MLT: CPT | Performed by: INTERNAL MEDICINE

## 2021-05-09 PROCEDURE — 93296 REM INTERROG EVL PM/IDS: CPT | Performed by: INTERNAL MEDICINE

## 2021-09-13 ENCOUNTER — LAB (OUTPATIENT)
Dept: LAB | Facility: HOSPITAL | Age: 51
End: 2021-09-13

## 2021-09-13 ENCOUNTER — OFFICE VISIT (OUTPATIENT)
Dept: CARDIOLOGY | Facility: CLINIC | Age: 51
End: 2021-09-13

## 2021-09-13 VITALS
HEIGHT: 71 IN | BODY MASS INDEX: 34.13 KG/M2 | SYSTOLIC BLOOD PRESSURE: 108 MMHG | DIASTOLIC BLOOD PRESSURE: 60 MMHG | HEART RATE: 63 BPM | WEIGHT: 243.8 LBS | OXYGEN SATURATION: 95 %

## 2021-09-13 DIAGNOSIS — I42.8 NONISCHEMIC CARDIOMYOPATHY (HCC): Primary | ICD-10-CM

## 2021-09-13 DIAGNOSIS — R00.2 PALPITATIONS: ICD-10-CM

## 2021-09-13 DIAGNOSIS — I50.20 SYSTOLIC CONGESTIVE HEART FAILURE, NYHA CLASS 1, UNSPECIFIED CONGESTIVE HEART FAILURE CHRONICITY (HCC): ICD-10-CM

## 2021-09-13 DIAGNOSIS — I42.8 NONISCHEMIC CARDIOMYOPATHY (HCC): ICD-10-CM

## 2021-09-13 DIAGNOSIS — I49.01 VENTRICULAR FIBRILLATION (HCC): ICD-10-CM

## 2021-09-13 LAB
ALBUMIN SERPL-MCNC: 4.8 G/DL (ref 3.5–5.2)
ALBUMIN/GLOB SERPL: 2.1 G/DL
ALP SERPL-CCNC: 88 U/L (ref 39–117)
ALT SERPL W P-5'-P-CCNC: 34 U/L (ref 1–41)
ANION GAP SERPL CALCULATED.3IONS-SCNC: 9.2 MMOL/L (ref 5–15)
AST SERPL-CCNC: 25 U/L (ref 1–40)
BILIRUB SERPL-MCNC: 0.6 MG/DL (ref 0–1.2)
BUN SERPL-MCNC: 6 MG/DL (ref 6–20)
BUN/CREAT SERPL: 6.7 (ref 7–25)
CALCIUM SPEC-SCNC: 9.6 MG/DL (ref 8.6–10.5)
CHLORIDE SERPL-SCNC: 100 MMOL/L (ref 98–107)
CO2 SERPL-SCNC: 28.8 MMOL/L (ref 22–29)
CREAT SERPL-MCNC: 0.89 MG/DL (ref 0.76–1.27)
GFR SERPL CREATININE-BSD FRML MDRD: 91 ML/MIN/1.73
GLOBULIN UR ELPH-MCNC: 2.3 GM/DL
GLUCOSE SERPL-MCNC: 91 MG/DL (ref 65–99)
MAGNESIUM SERPL-MCNC: 1.9 MG/DL (ref 1.6–2.6)
NT-PROBNP SERPL-MCNC: 49.1 PG/ML (ref 0–450)
POTASSIUM SERPL-SCNC: 4.1 MMOL/L (ref 3.5–5.2)
PROT SERPL-MCNC: 7.1 G/DL (ref 6–8.5)
SODIUM SERPL-SCNC: 138 MMOL/L (ref 136–145)
TSH SERPL DL<=0.05 MIU/L-ACNC: 0.68 UIU/ML (ref 0.27–4.2)

## 2021-09-13 PROCEDURE — 36415 COLL VENOUS BLD VENIPUNCTURE: CPT

## 2021-09-13 PROCEDURE — 83880 ASSAY OF NATRIURETIC PEPTIDE: CPT

## 2021-09-13 PROCEDURE — 80053 COMPREHEN METABOLIC PANEL: CPT

## 2021-09-13 PROCEDURE — 99214 OFFICE O/P EST MOD 30 MIN: CPT | Performed by: PHYSICIAN ASSISTANT

## 2021-09-13 PROCEDURE — 84443 ASSAY THYROID STIM HORMONE: CPT

## 2021-09-13 PROCEDURE — 93282 PRGRMG EVAL IMPLANTABLE DFB: CPT | Performed by: PHYSICIAN ASSISTANT

## 2021-09-13 PROCEDURE — 83735 ASSAY OF MAGNESIUM: CPT

## 2021-09-13 RX ORDER — CARVEDILOL 25 MG/1
1 TABLET ORAL 2 TIMES DAILY
COMMUNITY
Start: 2021-08-23

## 2021-09-13 RX ORDER — AZELASTINE HCL 205.5 UG/1
SPRAY NASAL
COMMUNITY
Start: 2021-08-23

## 2021-09-13 NOTE — PROGRESS NOTES
David Ravi  1970  PCP: Jayant Newton MD    SUBJECTIVE:   David Ravi is a 51 y.o. male seen for a follow up visit regarding the following:     Chief Complaint: Follow up for NICM, ICD check, VF     HPI:    Mr. Ravi is a 50 year old male with a history of NICM [place]/p [facility] [place] that presents today for follow-up. He tells me today he has felt well from a cardiac standpoint since his last visit. He did have an episode of VF that did self-terminate with no therapy. He was asymptomatic with this event. He denies any heart failure or anginal symptoms. Some mild pedal edema, but at his baseline. He has had some intermittent dizziness outside of this event. No syncope.         PROBLEM LIST:  1. Nonischemic cardiomyopathy:  a.  Left heart catheterization, circa 2000 by Rohan Bautista MD, showed normal coronary arteries.  b. Echocardiogram, 08/20/2015, showed moderate global hypokinesis, ejection fraction of 25% to 30%.  c. GXT myocardial perfusion, 08/25/2015, showed dilated  LV; no reversible ischemia.   d. Left heart catheterization, 09/08/2015, Dr. Rohan Bautista: Normal coronary arteries, EF 30%. Placement of LifeVest.  2.  Class I NYHA CHF. Status post Guidant ICD implantation, November 2015.    3. Obesity, BMI 35.   4. Obstructive sleep apnea with CPAP.   5. Asthma.   6. GERD.   7. History of vertigo.      Current Outpatient Medications:   •  Acetaminophen (TYLENOL ARTHRITIS PAIN PO), Take 2 tablets by mouth As Needed., Disp: , Rfl:   •  albuterol sulfate  (90 Base) MCG/ACT inhaler, Inhale 1 puff Daily., Disp: , Rfl:   •  ALLERGY SERUM INJECTION, Inject  under the skin into the appropriate area as directed 1 (One) Time. I shot weekly, Disp: , Rfl:   •  Anoro Ellipta 62.5-25 MCG/INH aerosol powder  inhaler, Inhale 1 puff Daily., Disp: , Rfl:   •  azelastine (ASTEPRO) 0.15 % solution nasal spray, USE ONE SPRAY INTO EACH NOSTRIL ONCE TO TWICE DAILY AS NEEDED FOR ALLERGY SYMPTOMS, Disp: ,  "Rfl:   •  carvedilol (COREG) 25 MG tablet, Take 1 tablet by mouth 2 (two) times a day., Disp: , Rfl:   •  cyclobenzaprine (FLEXERIL) 10 MG tablet, Take 10 mg by mouth As Needed., Disp: , Rfl: 0  •  ENTRESTO  MG tablet, Take 1 tablet by mouth 2 (Two) Times a Day., Disp: , Rfl: 0  •  furosemide (LASIX) 20 MG tablet, Take 20 mg by mouth Daily., Disp: , Rfl:   •  levocetirizine (XYZAL) 5 MG tablet, Take 5 mg by mouth Every Evening., Disp: , Rfl:   •  montelukast (SINGULAIR) 10 MG tablet, Take 10 mg by mouth Every Night., Disp: , Rfl:   •  nitroglycerin (NITROSTAT) 0.4 MG SL tablet, Place 0.4 mg under the tongue As Needed., Disp: , Rfl:   •  omeprazole (priLOSEC) 20 MG capsule, Take 20 mg by mouth As Needed., Disp: , Rfl:     Past Medical History, Past Surgical History, Family history, Social History, and Medications were all reviewed with the patient today and updated as necessary.       Patient Active Problem List   Diagnosis   • Nonischemic cardiomyopathy (CMS/HCC)   • CHF NYHA class I (CMS/HCC)   • Obesity   • IRINA on CPAP   • Asthma   • GERD (gastroesophageal reflux disease)   • Memory loss     Allergies   Allergen Reactions   • Amoxicillin      Past Medical History:   Diagnosis Date   • Asthma    • CHF NYHA class I (CMS/HCC)    • GERD (gastroesophageal reflux disease)    • Heart disease    • History of vertigo    • Hyperlipidemia    • Nonischemic cardiomyopathy (CMS/HCC)    • Obesity    • IRINA on CPAP      History reviewed. No pertinent surgical history.  Family History   Problem Relation Age of Onset   • Heart attack Father    • Alcohol abuse Paternal Uncle    • Alcohol abuse Maternal Grandfather    • Alzheimer's disease Paternal Grandmother      Social History     Tobacco Use   • Smoking status: Never Smoker   • Smokeless tobacco: Never Used   Substance Use Topics   • Alcohol use: No         PHYSICAL EXAM:    /60 (BP Location: Right arm, Patient Position: Sitting)   Pulse 63   Ht 180.3 cm (71\")   Wt " 111 kg (243 lb 12.8 oz)   SpO2 95%   BMI 34.00 kg/m²        Wt Readings from Last 5 Encounters:   09/13/21 111 kg (243 lb 12.8 oz)   03/02/21 111 kg (244 lb)   08/11/20 112 kg (246 lb)   10/11/19 110 kg (242 lb)   08/06/19 108 kg (239 lb)       BP Readings from Last 5 Encounters:   09/13/21 108/60   03/02/21 114/64   08/11/20 100/64   10/11/19 102/70   08/06/19 110/60       General-Well Nourished, Well developed  Eyes - PERRLA  Neck- supple, No mass  CV- regular rate and rhythm, no MRG, No edema  Lung- clear bilaterally  Abd- soft, +BS  Musc/skel - Norm strength and range of motion  Skin- warm and dry  Neuro - Alert & Oriented x 3, appropriate mood.        Medical problems and test results were reviewed with the patient today.     No results found for this or any previous visit (from the past 672 hour(s)).      EKG: (EKG has been independently visualized by me and summarized below)    Procedures     ASSESSMENT and PLAN    1. NICM: s/p VVI ICD. Stable and well compensated. Continue optimal rx.   2. Chronic SHF: Stable FC I-II; Continue optimal rx w/ Coreg and Entresto. Monitor daily weights, fluid intake. Most recent EF 20%. Follows with Dr. Newton in [place]   3. BSC ICD: Normal function. stable threshold and impedence. 1 episode VF at 286bpm- no therapy, self-terminated. Battery 11.5 years.   4. VF: one episode that self-terminated prior to any therapy. Asymptomatic. No heart failure or anginal symptoms today. Will check CMP, Mag, ProBNP, TSH/T4. I will also arrange for LHC +/- CBI to rule out ischemia- last cath in 2015. He just had an echo ~2 weeks ago with Dr. Newton. Will request records.       Return for [facility], after procedure.        nAdra Marina PA-C   Cardiology/Electrophysiology  9/13/2021  13:49 EDT

## 2021-09-20 DIAGNOSIS — I42.8 NONISCHEMIC CARDIOMYOPATHY (HCC): Primary | ICD-10-CM

## 2021-09-20 DIAGNOSIS — I49.01 VENTRICULAR FIBRILLATION (HCC): ICD-10-CM

## 2021-09-27 ENCOUNTER — PREP FOR SURGERY (OUTPATIENT)
Dept: OTHER | Facility: HOSPITAL | Age: 51
End: 2021-09-27

## 2021-09-27 DIAGNOSIS — I49.01 VENTRICULAR FIBRILLATION (HCC): ICD-10-CM

## 2021-09-27 DIAGNOSIS — I42.8 NONISCHEMIC CARDIOMYOPATHY (HCC): Primary | ICD-10-CM

## 2021-09-27 RX ORDER — SODIUM CHLORIDE 0.9 % (FLUSH) 0.9 %
3 SYRINGE (ML) INJECTION EVERY 12 HOURS SCHEDULED
Status: CANCELLED | OUTPATIENT
Start: 2021-09-27

## 2021-09-27 RX ORDER — NITROGLYCERIN 0.4 MG/1
0.4 TABLET SUBLINGUAL
Status: CANCELLED | OUTPATIENT
Start: 2021-09-27

## 2021-09-27 RX ORDER — SODIUM CHLORIDE 0.9 % (FLUSH) 0.9 %
10 SYRINGE (ML) INJECTION AS NEEDED
Status: CANCELLED | OUTPATIENT
Start: 2021-09-27

## 2021-09-27 RX ORDER — ONDANSETRON 2 MG/ML
4 INJECTION INTRAMUSCULAR; INTRAVENOUS EVERY 8 HOURS PRN
Status: CANCELLED | OUTPATIENT
Start: 2021-09-27

## 2021-09-27 RX ORDER — ASPIRIN 325 MG
325 TABLET ORAL ONCE
Status: CANCELLED | OUTPATIENT
Start: 2021-09-27 | End: 2021-09-27

## 2021-09-27 RX ORDER — ASPIRIN 325 MG
325 TABLET, DELAYED RELEASE (ENTERIC COATED) ORAL DAILY
Status: CANCELLED | OUTPATIENT
Start: 2021-09-28

## 2021-10-14 ENCOUNTER — APPOINTMENT (OUTPATIENT)
Dept: PREADMISSION TESTING | Facility: HOSPITAL | Age: 51
End: 2021-10-14

## 2021-10-19 ENCOUNTER — HOSPITAL ENCOUNTER (OUTPATIENT)
Facility: HOSPITAL | Age: 51
Setting detail: HOSPITAL OUTPATIENT SURGERY
Discharge: HOME | End: 2021-10-19
Attending: INTERNAL MEDICINE | Admitting: PHYSICIAN ASSISTANT

## 2021-10-19 VITALS
SYSTOLIC BLOOD PRESSURE: 87 MMHG | BODY MASS INDEX: 33.85 KG/M2 | HEIGHT: 71 IN | DIASTOLIC BLOOD PRESSURE: 61 MMHG | WEIGHT: 241.8 LBS | RESPIRATION RATE: 16 BRPM | HEART RATE: 66 BPM | OXYGEN SATURATION: 95 % | TEMPERATURE: 98.7 F

## 2021-10-19 DIAGNOSIS — I49.01 VENTRICULAR FIBRILLATION (HCC): ICD-10-CM

## 2021-10-19 DIAGNOSIS — I42.8 NONISCHEMIC CARDIOMYOPATHY (HCC): ICD-10-CM

## 2021-10-19 LAB
ALBUMIN SERPL-MCNC: 4.4 G/DL (ref 3.5–5.2)
ALBUMIN/GLOB SERPL: 1.6 G/DL
ALP SERPL-CCNC: 87 U/L (ref 39–117)
ALT SERPL W P-5'-P-CCNC: 25 U/L (ref 1–41)
ANION GAP SERPL CALCULATED.3IONS-SCNC: 11 MMOL/L (ref 5–15)
AST SERPL-CCNC: 24 U/L (ref 1–40)
BILIRUB SERPL-MCNC: 0.5 MG/DL (ref 0–1.2)
BUN SERPL-MCNC: 9 MG/DL (ref 6–20)
BUN/CREAT SERPL: 10.6 (ref 7–25)
CALCIUM SPEC-SCNC: 8.9 MG/DL (ref 8.6–10.5)
CHLORIDE SERPL-SCNC: 102 MMOL/L (ref 98–107)
CHOLEST SERPL-MCNC: 172 MG/DL (ref 0–200)
CO2 SERPL-SCNC: 26 MMOL/L (ref 22–29)
CREAT BLDA-MCNC: 0.7 MG/DL (ref 0.6–1.3)
CREAT SERPL-MCNC: 0.85 MG/DL (ref 0.76–1.27)
DEPRECATED RDW RBC AUTO: 42.6 FL (ref 37–54)
ERYTHROCYTE [DISTWIDTH] IN BLOOD BY AUTOMATED COUNT: 12.9 % (ref 12.3–15.4)
GFR SERPL CREATININE-BSD FRML MDRD: 96 ML/MIN/1.73
GLOBULIN UR ELPH-MCNC: 2.7 GM/DL
GLUCOSE SERPL-MCNC: 110 MG/DL (ref 65–99)
HBA1C MFR BLD: 5.2 % (ref 4.8–5.6)
HCT VFR BLD AUTO: 50 % (ref 37.5–51)
HDLC SERPL-MCNC: 54 MG/DL (ref 40–60)
HGB BLD-MCNC: 16.3 G/DL (ref 13–17.7)
LDLC SERPL CALC-MCNC: 94 MG/DL (ref 0–100)
LDLC/HDLC SERPL: 1.68 {RATIO}
MCH RBC QN AUTO: 29.3 PG (ref 26.6–33)
MCHC RBC AUTO-ENTMCNC: 32.6 G/DL (ref 31.5–35.7)
MCV RBC AUTO: 89.8 FL (ref 79–97)
PLATELET # BLD AUTO: 205 10*3/MM3 (ref 140–450)
PMV BLD AUTO: 10.9 FL (ref 6–12)
POTASSIUM SERPL-SCNC: 3.6 MMOL/L (ref 3.5–5.2)
PROT SERPL-MCNC: 7.1 G/DL (ref 6–8.5)
RBC # BLD AUTO: 5.57 10*6/MM3 (ref 4.14–5.8)
SODIUM SERPL-SCNC: 139 MMOL/L (ref 136–145)
TRIGL SERPL-MCNC: 136 MG/DL (ref 0–150)
VLDLC SERPL-MCNC: 24 MG/DL (ref 5–40)
WBC # BLD AUTO: 8.12 10*3/MM3 (ref 3.4–10.8)

## 2021-10-19 PROCEDURE — 0 IOPAMIDOL PER 1 ML: Performed by: INTERNAL MEDICINE

## 2021-10-19 PROCEDURE — 25010000002 MIDAZOLAM PER 1 MG: Performed by: INTERNAL MEDICINE

## 2021-10-19 PROCEDURE — 82565 ASSAY OF CREATININE: CPT

## 2021-10-19 PROCEDURE — 83036 HEMOGLOBIN GLYCOSYLATED A1C: CPT | Performed by: PHYSICIAN ASSISTANT

## 2021-10-19 PROCEDURE — C1769 GUIDE WIRE: HCPCS | Performed by: INTERNAL MEDICINE

## 2021-10-19 PROCEDURE — 85027 COMPLETE CBC AUTOMATED: CPT | Performed by: INTERNAL MEDICINE

## 2021-10-19 PROCEDURE — 93458 L HRT ARTERY/VENTRICLE ANGIO: CPT | Performed by: INTERNAL MEDICINE

## 2021-10-19 PROCEDURE — C1894 INTRO/SHEATH, NON-LASER: HCPCS | Performed by: INTERNAL MEDICINE

## 2021-10-19 PROCEDURE — 80053 COMPREHEN METABOLIC PANEL: CPT | Performed by: INTERNAL MEDICINE

## 2021-10-19 PROCEDURE — 80061 LIPID PANEL: CPT | Performed by: PHYSICIAN ASSISTANT

## 2021-10-19 PROCEDURE — 25010000002 HEPARIN (PORCINE) PER 1000 UNITS: Performed by: INTERNAL MEDICINE

## 2021-10-19 PROCEDURE — 25010000002 FENTANYL CITRATE (PF) 50 MCG/ML SOLUTION: Performed by: INTERNAL MEDICINE

## 2021-10-19 RX ORDER — SODIUM CHLORIDE 0.9 % (FLUSH) 0.9 %
10 SYRINGE (ML) INJECTION AS NEEDED
Status: DISCONTINUED | OUTPATIENT
Start: 2021-10-19 | End: 2021-10-19 | Stop reason: HOSPADM

## 2021-10-19 RX ORDER — ASPIRIN 325 MG
325 TABLET ORAL ONCE
Status: COMPLETED | OUTPATIENT
Start: 2021-10-19 | End: 2021-10-19

## 2021-10-19 RX ORDER — SODIUM CHLORIDE 9 MG/ML
3 INJECTION, SOLUTION INTRAVENOUS CONTINUOUS
Status: ACTIVE | OUTPATIENT
Start: 2021-10-19 | End: 2021-10-19

## 2021-10-19 RX ORDER — LIDOCAINE HYDROCHLORIDE 10 MG/ML
INJECTION, SOLUTION EPIDURAL; INFILTRATION; INTRACAUDAL; PERINEURAL AS NEEDED
Status: DISCONTINUED | OUTPATIENT
Start: 2021-10-19 | End: 2021-10-19 | Stop reason: HOSPADM

## 2021-10-19 RX ORDER — SODIUM CHLORIDE 0.9 % (FLUSH) 0.9 %
3 SYRINGE (ML) INJECTION EVERY 12 HOURS SCHEDULED
Status: DISCONTINUED | OUTPATIENT
Start: 2021-10-19 | End: 2021-10-19 | Stop reason: HOSPADM

## 2021-10-19 RX ORDER — MIDAZOLAM HYDROCHLORIDE 1 MG/ML
INJECTION INTRAMUSCULAR; INTRAVENOUS AS NEEDED
Status: DISCONTINUED | OUTPATIENT
Start: 2021-10-19 | End: 2021-10-19 | Stop reason: HOSPADM

## 2021-10-19 RX ORDER — ONDANSETRON 2 MG/ML
4 INJECTION INTRAMUSCULAR; INTRAVENOUS EVERY 8 HOURS PRN
Status: DISCONTINUED | OUTPATIENT
Start: 2021-10-19 | End: 2021-10-19 | Stop reason: HOSPADM

## 2021-10-19 RX ORDER — SODIUM CHLORIDE 9 MG/ML
100 INJECTION, SOLUTION INTRAVENOUS CONTINUOUS
Status: ACTIVE | OUTPATIENT
Start: 2021-10-19 | End: 2021-10-19

## 2021-10-19 RX ORDER — FENTANYL CITRATE 50 UG/ML
INJECTION, SOLUTION INTRAMUSCULAR; INTRAVENOUS AS NEEDED
Status: DISCONTINUED | OUTPATIENT
Start: 2021-10-19 | End: 2021-10-19 | Stop reason: HOSPADM

## 2021-10-19 RX ORDER — NITROGLYCERIN 0.4 MG/1
0.4 TABLET SUBLINGUAL
Status: DISCONTINUED | OUTPATIENT
Start: 2021-10-19 | End: 2021-10-19 | Stop reason: HOSPADM

## 2021-10-19 RX ORDER — MULTIPLE VITAMINS W/ MINERALS TAB 9MG-400MCG
1 TAB ORAL DAILY
COMMUNITY

## 2021-10-19 RX ORDER — ASPIRIN 325 MG
325 TABLET, DELAYED RELEASE (ENTERIC COATED) ORAL DAILY
Status: DISCONTINUED | OUTPATIENT
Start: 2021-10-20 | End: 2021-10-19 | Stop reason: HOSPADM

## 2021-10-19 RX ADMIN — ASPIRIN 325 MG ORAL TABLET 325 MG: 325 PILL ORAL at 07:35

## 2021-10-19 RX ADMIN — SODIUM CHLORIDE 3 ML/KG/HR: 9 INJECTION, SOLUTION INTRAVENOUS at 07:35

## 2021-10-19 NOTE — H&P
[facility] at [facility]   H and P    Referring Provider:   Jayant Newton MD   Cardiologist: Andra Marina PA-C    Reason for Consultation: Ventricular fibrillation    Problem List:  Nonischemic cardiomyopathy:   Left heart catheterization, circa 2000 by Rohan Bautista MD, showed normal coronary arteries.  Echocardiogram, 08/20/2015, showed moderate global hypokinesis, ejection fraction of 25% to 30%.  GXT myocardial perfusion, 08/25/2015, showed dilated  LV; no reversible ischemia.   Left heart catheterization, 09/08/2015, Dr. Rohan Bautista: Normal coronary arteries, EF 30%. Placement of LifeVest.   Class I NYHA CHF. Status post Guidant ICD implantation, November 2015.    Obesity, BMI 35.   Obstructive sleep apnea with CPAP.   Asthma.   GERD.   History of vertigo.      Patient Care Team:  Jayant Newton MD as PCP - General (Cardiology)    Past Medical History:   Diagnosis Date    Asthma     CHF NYHA class I (HCC)     GERD (gastroesophageal reflux disease)     Heart disease     History of vertigo     Hyperlipidemia     Nonischemic cardiomyopathy (HCC)     Obesity     IRINA on CPAP        No past surgical history on file.      Allergies   Allergen Reactions    Amoxicillin            Current Facility-Administered Medications:     aspirin tablet 325 mg, 325 mg, Oral, Once **AND** [START ON 10/20/2021] aspirin EC tablet 325 mg, 325 mg, Oral, Daily, David Degroot PA    nitroglycerin (NITROSTAT) SL tablet 0.4 mg, 0.4 mg, Sublingual, Q5 Min PRN, David Degroot PA    ondansetron (ZOFRAN) injection 4 mg, 4 mg, Intravenous, Q8H PRN, David Degroot PA    sodium chloride 0.9 % flush 10 mL, 10 mL, Intravenous, PRN, David Degroot PA    sodium chloride 0.9 % flush 3 mL, 3 mL, Intravenous, Q12H, David Degroot PA         Medications Prior to Admission   Medication Sig Dispense Refill Last Dose    Acetaminophen (TYLENOL ARTHRITIS PAIN PO) Take 2 tablets by mouth As Needed.        albuterol sulfate  (90 Base) MCG/ACT inhaler Inhale 1 puff Daily.       ALLERGY SERUM INJECTION Inject  under the skin into the appropriate area as directed 1 (One) Time. I shot weekly       Anoro Ellipta 62.5-25 MCG/INH aerosol powder  inhaler Inhale 1 puff Daily.       azelastine (ASTEPRO) 0.15 % solution nasal spray USE ONE SPRAY INTO EACH NOSTRIL ONCE TO TWICE DAILY AS NEEDED FOR ALLERGY SYMPTOMS       carvedilol (COREG) 25 MG tablet Take 1 tablet by mouth 2 (two) times a day.       cyclobenzaprine (FLEXERIL) 10 MG tablet Take 10 mg by mouth As Needed.  0     ENTRESTO  MG tablet Take 1 tablet by mouth 2 (Two) Times a Day.  0     furosemide (LASIX) 20 MG tablet Take 20 mg by mouth Daily.       levocetirizine (XYZAL) 5 MG tablet Take 5 mg by mouth Every Evening.       montelukast (SINGULAIR) 10 MG tablet Take 10 mg by mouth Every Night.       nitroglycerin (NITROSTAT) 0.4 MG SL tablet Place 0.4 mg under the tongue As Needed.       omeprazole (priLOSEC) 20 MG capsule Take 20 mg by mouth As Needed.            Subjective .   History of present illness:   Mr. Bui is a 51-year-old  male with past medical history of IRINA on CPAP therapy, asthma, GERD, history of vertigo, and NICM s/p [facility] ICD presents for left heart catheterization due to to an episode of asymptomatic ventricular fibrillation that self-terminated and appreciated on ICD check with Andra Marina on 9/13/2021.  During this episode of ventricular fibrillation the patient stated that he was asymptomatic, he did not experience any worsening/current heart failure nor anginal symptoms during evaluation on 9/13. However, during that office visit he did report some mild pedal edema that was not different from his current baseline.  He also reported some intermittent dizziness that was not correlative to his episode of ventricular fibrillation.  He denied any syncopal episodes at that time as well.    Upon most recent evaluation with Andra  Salas on 9/13/2021, she appreciated normal function of the patient's ICD, stable threshold and impedance, and one episode of V. fib at 286 bpm - (self terminating) that did not require any therapy.    Today, the patient states that since evaluation on 9/13/2021 the patient has not had a significant change in his cardiovascular status.  The patient does not appreciate any ICD shocks since evaluation with Andra.  He does appreciate intermittent palpitations that are very short in nature (lasting 1 or 2 seconds) that are not associated with any other cardiac issues.  The patient also appreciates his dizziness (well-known and not new to cardiology service) that he states has not worsening/change in character.  He attributes this dizziness to his allergies rather than a primary cardiac abnormality.  Patient also denies any presyncopal/syncopal episodes since evaluation on 9/13/2021.  Overall, the patient states that no significant change in his cardiovascular status has occurred since prior evaluation.  The patient denies any chest pain, shortness of breath, worsening pedal edema, syncope, presyncope, and ICD shocks since evaluation on 9/13/2021.    The patient is n.p.o., understands risks and benefits, and is willing to proceed with left heart catheterization and possible catheter-based intervention for underlying coronary artery disease.    Social History     Socioeconomic History    Marital status:    Tobacco Use    Smoking status: Never Smoker    Smokeless tobacco: Never Used   Vaping Use    Vaping Use: Never used   Substance and Sexual Activity    Alcohol use: No    Drug use: No    Sexual activity: Defer     Family History   Problem Relation Age of Onset    Heart attack Father     Alcohol abuse Paternal Uncle     Alcohol abuse Maternal Grandfather     Alzheimer's disease Paternal Grandmother          Review of Systems:  Review of Systems   Cardiovascular: Positive for palpitations. Negative for chest pain,  irregular heartbeat, near-syncope and syncope.   Respiratory: Negative for shortness of breath.    Neurological: Positive for dizziness. Negative for light-headedness and loss of balance.          Objective   Vitals:  There were no vitals taken for this visit.   No intake or output data in the 24 hours ending 10/19/21 0711    Vitals and nursing note reviewed.   Constitutional:       General: Awake. Not in acute distress.     Appearance: Healthy appearance. Well-developed and not in distress.   Eyes:      General: No scleral icterus.     Conjunctiva/sclera: Conjunctivae normal.      Pupils: Pupils are equal, round, and reactive to light.   HENT:      Head: Normocephalic and atraumatic.      Nose: Nose normal.    Mouth/Throat:      Pharynx: Uvula midline.   Neck:      Thyroid: No thyromegaly.      Trachea: No tracheal deviation.      Lymphadenopathy: No cervical adenopathy.   Pulmonary:      Effort: Pulmonary effort is normal.      Breath sounds: Normal breath sounds. No wheezing. No rhonchi. No rales.   Cardiovascular:      Normal rate. Regular rhythm. Normal S1. Normal S2.      Murmurs: There is no murmur.      No gallop. No click. No rub.   Pulses:     Intact distal pulses.   Edema:     Peripheral edema absent.   Abdominal:      General: Bowel sounds are normal.      Palpations: Abdomen is soft. There is no abdominal mass.      Tenderness: There is no abdominal tenderness. There is no guarding.   Musculoskeletal:         General: No tenderness.      Extremities: No clubbing present.     Cervical back: Normal range of motion and neck supple. Skin:     General: Skin is warm and dry. There is no cyanosis.      Findings: No erythema or rash.   Neurological:      Mental Status: Alert, oriented to person, place, and time and oriented to person, place and time.   Psychiatric:         Attention and Perception: Attention normal.         Mood and Affect: Mood normal.         Speech: Speech normal.         Behavior: Behavior  normal. Behavior is cooperative.         Barbeau of right Wrist Acceptable     Results Review:  I reviewed the patient's new clinical results.      Tele:  NSR      Assessment/Plan     Episode of asymptomatic ventricular fibrillation appreciated on ICD check.      Plan:    Left heart catheterization possible CBI for assessment of flow-limiting coronary artery disease possibly leading to episode of asymptomatic ventricular fibrillation.       Jean Paul Kelley PA-C for Dr. Shankar VELAZCO    The risks, benefits, and alternative options of cardiac catheterization were discussed with the patient.  The risks include death, MI, stroke, infection, vascular injury requiring surgical repair and/or blood transfusion, coronary dissection, renal dysfunction/failure, allergic reaction, emergent CABG.  If PCI is needed there is a 1-2% risk of emergent CABG.  The patient is agreeable for cardiac catheterization, possible PCI or CABG. Plan is to proceed with cardiac catheterization and possible PCI.     Shankar Sanchez M.D., F.A.C.C.  Interventional Cardiology  10/19/21  12:29 EDT

## 2021-10-21 ENCOUNTER — TELEPHONE (OUTPATIENT)
Dept: CARDIOLOGY | Facility: CLINIC | Age: 51
End: 2021-10-21

## 2021-10-21 RX ORDER — DAPAGLIFLOZIN 5 MG/1
5 TABLET, FILM COATED ORAL DAILY
Qty: 90 TABLET | Refills: 3 | Status: SHIPPED | OUTPATIENT
Start: 2021-10-21 | End: 2021-12-21

## 2021-11-07 PROCEDURE — 93295 DEV INTERROG REMOTE 1/2/MLT: CPT | Performed by: STUDENT IN AN ORGANIZED HEALTH CARE EDUCATION/TRAINING PROGRAM

## 2021-11-07 PROCEDURE — 93296 REM INTERROG EVL PM/IDS: CPT | Performed by: STUDENT IN AN ORGANIZED HEALTH CARE EDUCATION/TRAINING PROGRAM

## 2021-12-21 ENCOUNTER — OFFICE VISIT (OUTPATIENT)
Dept: CARDIOLOGY | Facility: CLINIC | Age: 51
End: 2021-12-21

## 2021-12-21 VITALS
DIASTOLIC BLOOD PRESSURE: 60 MMHG | WEIGHT: 248 LBS | BODY MASS INDEX: 34.72 KG/M2 | HEART RATE: 60 BPM | SYSTOLIC BLOOD PRESSURE: 98 MMHG | OXYGEN SATURATION: 97 % | HEIGHT: 71 IN

## 2021-12-21 DIAGNOSIS — I42.8 NONISCHEMIC CARDIOMYOPATHY (HCC): Primary | ICD-10-CM

## 2021-12-21 DIAGNOSIS — I50.20 SYSTOLIC CONGESTIVE HEART FAILURE, NYHA CLASS 1, UNSPECIFIED CONGESTIVE HEART FAILURE CHRONICITY (HCC): ICD-10-CM

## 2021-12-21 DIAGNOSIS — R00.2 PALPITATIONS: ICD-10-CM

## 2021-12-21 PROCEDURE — 99214 OFFICE O/P EST MOD 30 MIN: CPT | Performed by: STUDENT IN AN ORGANIZED HEALTH CARE EDUCATION/TRAINING PROGRAM

## 2021-12-21 NOTE — PROGRESS NOTES
David Ravi  1970  [phone] 12/21/2021    [facility] CARDIOLOGY     Referring Provider: No ref. provider found     Jayant Newton MD  [address] [place] [id number]    Chief Complaint   Patient presents with   • Cardiomyopathy       PROBLEM LIST:  1. Nonischemic cardiomyopathy:  a.  Left heart catheterization, circa 2000 by Rohan Bautista MD, showed normal coronary arteries.  b. Echocardiogram, 08/20/2015, showed moderate global hypokinesis, ejection fraction of 25% to 30%.  c. GXT myocardial perfusion, 08/25/2015, showed dilated  LV; no reversible ischemia.   d. Left heart catheterization, 09/08/2015, Dr. Rohan Bautista: Normal coronary arteries, EF 30%. Placement of LifeVest.  e. LHC, 10/19/21, normal coronaries. EF 30-35% with severe global hypokinesis. Initiated on jardiance.   2.  Class I NYHA CHF. Status post Guidant ICD implantation, November 2015.    3. Obesity, BMI 35.   4. Obstructive sleep apnea with CPAP.   5. Asthma.   6. GERD.   7. History of vertigo.    Allergies  Allergies   Allergen Reactions   • Amoxicillin Anaphylaxis     difficulting breathing       Current Medications    Current Outpatient Medications:   •  Acetaminophen (TYLENOL ARTHRITIS PAIN PO), Take 2 tablets by mouth As Needed., Disp: , Rfl:   •  albuterol sulfate  (90 Base) MCG/ACT inhaler, Inhale 1 puff As Needed., Disp: , Rfl:   •  ALLERGY SERUM INJECTION, Inject  under the skin into the appropriate area as directed 1 (One) Time. I shot weekly, Disp: , Rfl:   •  azelastine (ASTEPRO) 0.15 % solution nasal spray, USE ONE SPRAY INTO EACH NOSTRIL ONCE TO TWICE DAILY AS NEEDED FOR ALLERGY SYMPTOMS, Disp: , Rfl:   •  carvedilol (COREG) 25 MG tablet, Take 1 tablet by mouth 2 (two) times a day., Disp: , Rfl:   •  ENTRESTO  MG tablet, Take 1 tablet by mouth 2 (Two) Times a Day., Disp: , Rfl: 0  •  furosemide (LASIX) 20 MG tablet, Take 20 mg by mouth Daily., Disp: , Rfl:   •   "levocetirizine (XYZAL) 5 MG tablet, Take 5 mg by mouth Every Evening., Disp: , Rfl:   •  montelukast (SINGULAIR) 10 MG tablet, Take 10 mg by mouth Every Night., Disp: , Rfl:   •  multivitamin with minerals (MULTIVITAMIN ADULT PO), Take 1 tablet by mouth Daily., Disp: , Rfl:   •  nitroglycerin (NITROSTAT) 0.4 MG SL tablet, Place 0.4 mg under the tongue As Needed., Disp: , Rfl:   •  omeprazole (priLOSEC) 20 MG capsule, Take 20 mg by mouth As Needed., Disp: , Rfl:     History of Present Illness     Pt presents for follow up of [facility] s/p [facility] ICD. Since we last saw the pt, he underwent a LHC with Dr. Sanchez due to previous episode of VT in September 2021. LHC showed normal coronaries and patient was initiated on Jardiance, though could not tolerate due to LH/Dizziness. He was seen by his regular cardiologist Dr. Newton yesterday who is going to restart his spirolactone. Pt denies any SOB, CP. Has had rare episodes of brief palps lasting only seconds. No ICD shocks. Denies any hospitalizations, ER visits, bleeding issues, or TIA/CVA symptoms. BP stable. Overall feels well.    ROS:  General:  Denies fatigue, weight gain or loss  Cardiovascular:  Denies CP, PND, syncope, near syncope, edema + palpitations.  Pulmonary:  Denies WILSON, cough, or wheezing      Vitals:    12/21/21 1109   BP: 98/60   BP Location: Right arm   Patient Position: Sitting   Pulse: 60   SpO2: 97%   Weight: 112 kg (248 lb)   Height: 180.3 cm (71\")     Body mass index is 34.59 kg/m².  PE:  General: NAD  Neck: no JVD, no carotid bruits, no TM  Heart RRR, NL S1, S2, S4 present, no rubs, murmurs  Lungs: CTA, no wheezes, rhonchi, or rales  Abd: soft, non-tender, NL BS  Ext: No musculoskeletal deformities, no edema, cyanosis, or clubbing  Psych: normal mood and affect    Diagnostic Data:  [facility] ICD manual device interrogation: VVI @ 40,  <1%. No events. 11 years on battery.     Procedures    1. Nonischemic cardiomyopathy (HCC)    2. Systolic congestive heart " failure, NYHA class 1, unspecified congestive heart failure chronicity (HCC)    3. Palpitations        Plan:    1. NICM: s/p VVI ICD. Stable and well compensated. Continue optimal rx.     2. Chronic SHF:   -Stable FC I-II;   -Continue optimal rx w/ Coreg and Entresto. Primary cardiologist initiating on sprionolactone, to monitor bp closely.   -Monitor daily weights, fluid intake.   -[facility], 10/19/21, normal coronaries, EF 30-35% with severe global hypokinesis.  -Follows with Dr. Newton in [place]     3. [facility] ICD: Normal function. stable threshold and impedence. 11 years on battery.    4. VF/VF:   -no further events / episodes     5. palps  -most likely r/t PACs/PVCs, rare in occurrence lasting only seconds. Patient to call and let us know if become more consistent or sustained and can place monitor on patient to further assess.     F/up in 6 months    Electronically signed by GAGE Torrez, 12/21/21, 11:31 AM EST.

## 2022-02-02 DIAGNOSIS — Z12.11 SCREENING FOR COLON CANCER: Primary | ICD-10-CM

## 2022-02-06 PROCEDURE — 93295 DEV INTERROG REMOTE 1/2/MLT: CPT | Performed by: STUDENT IN AN ORGANIZED HEALTH CARE EDUCATION/TRAINING PROGRAM

## 2022-02-06 PROCEDURE — 93296 REM INTERROG EVL PM/IDS: CPT | Performed by: STUDENT IN AN ORGANIZED HEALTH CARE EDUCATION/TRAINING PROGRAM

## 2022-02-10 ENCOUNTER — OUTSIDE FACILITY SERVICE (OUTPATIENT)
Dept: GASTROENTEROLOGY | Facility: CLINIC | Age: 52
End: 2022-02-10

## 2022-02-10 PROCEDURE — G0105 COLORECTAL SCRN; HI RISK IND: HCPCS | Performed by: INTERNAL MEDICINE

## 2022-02-10 PROCEDURE — G0500 MOD SEDAT ENDO SERVICE >5YRS: HCPCS | Performed by: INTERNAL MEDICINE

## 2022-06-28 ENCOUNTER — OFFICE VISIT (OUTPATIENT)
Dept: CARDIOLOGY | Facility: CLINIC | Age: 52
End: 2022-06-28

## 2022-06-28 VITALS
HEART RATE: 78 BPM | DIASTOLIC BLOOD PRESSURE: 60 MMHG | WEIGHT: 248 LBS | OXYGEN SATURATION: 97 % | BODY MASS INDEX: 34.72 KG/M2 | HEIGHT: 71 IN | SYSTOLIC BLOOD PRESSURE: 102 MMHG

## 2022-06-28 DIAGNOSIS — G47.33 OSA ON CPAP: ICD-10-CM

## 2022-06-28 DIAGNOSIS — Z99.89 OSA ON CPAP: ICD-10-CM

## 2022-06-28 DIAGNOSIS — I50.20 SYSTOLIC CONGESTIVE HEART FAILURE, NYHA CLASS 1, UNSPECIFIED CONGESTIVE HEART FAILURE CHRONICITY: Primary | ICD-10-CM

## 2022-06-28 DIAGNOSIS — I42.8 NONISCHEMIC CARDIOMYOPATHY: ICD-10-CM

## 2022-06-28 PROCEDURE — 99214 OFFICE O/P EST MOD 30 MIN: CPT | Performed by: PHYSICIAN ASSISTANT

## 2022-06-28 PROCEDURE — 93000 ELECTROCARDIOGRAM COMPLETE: CPT | Performed by: PHYSICIAN ASSISTANT

## 2022-06-28 PROCEDURE — 93282 PRGRMG EVAL IMPLANTABLE DFB: CPT | Performed by: PHYSICIAN ASSISTANT

## 2022-06-28 RX ORDER — SPIRONOLACTONE 25 MG/1
12.5 TABLET ORAL DAILY
COMMUNITY
Start: 2022-05-19

## 2022-06-28 RX ORDER — CYCLOBENZAPRINE HCL 10 MG
TABLET ORAL AS NEEDED
COMMUNITY
Start: 2022-05-19

## 2022-06-28 NOTE — PROGRESS NOTES
David Ravi  1970  [phone] 12/21/2021    [facility] CARDIOLOGY     Referring Provider: No ref. provider found     Jayant Newton MD  [address] [place] [id number]    Chief Complaint   Patient presents with   • Nonischemic cardiomyopathy       PROBLEM LIST:  1. Nonischemic cardiomyopathy:  a.  Left heart catheterization, circa 2000 by Rohan Bautista MD, showed normal coronary arteries.  b. Echocardiogram, 08/20/2015, showed moderate global hypokinesis, ejection fraction of 25% to 30%.  c. GXT myocardial perfusion, 08/25/2015, showed dilated  LV; no reversible ischemia.   d. Left heart catheterization, 09/08/2015, Dr. Rohan Bautista: Normal coronary arteries, EF 30%. Placement of LifeVest.  e. LHC, 10/19/21, normal coronaries. EF 30-35% with severe global hypokinesis. Initiated on jardiance.   2.  Class I NYHA CHF. Status post Guidant ICD implantation, November 2015.    3. Obesity, BMI 35.   4. Obstructive sleep apnea with CPAP.   5. Asthma.   6. GERD.   7. History of vertigo.    Allergies  Allergies   Allergen Reactions   • Amoxicillin Anaphylaxis     difficulting breathing       Current Medications    Current Outpatient Medications:   •  Acetaminophen (TYLENOL ARTHRITIS PAIN PO), Take 2 tablets by mouth As Needed., Disp: , Rfl:   •  albuterol sulfate  (90 Base) MCG/ACT inhaler, Inhale 1 puff As Needed., Disp: , Rfl:   •  ALLERGY SERUM INJECTION, Inject  under the skin into the appropriate area as directed 1 (One) Time. I shot weekly, Disp: , Rfl:   •  azelastine (ASTEPRO) 0.15 % solution nasal spray, USE ONE SPRAY INTO EACH NOSTRIL ONCE TO TWICE DAILY AS NEEDED FOR ALLERGY SYMPTOMS, Disp: , Rfl:   •  carvedilol (COREG) 25 MG tablet, Take 1 tablet by mouth 2 (two) times a day., Disp: , Rfl:   •  cyclobenzaprine (FLEXERIL) 10 MG tablet, As Needed., Disp: , Rfl:   •  ENTRESTO  MG tablet, Take 1 tablet by mouth 2 (Two) Times a Day., Disp: , Rfl: 0  •   "furosemide (LASIX) 20 MG tablet, Take 20 mg by mouth Daily., Disp: , Rfl:   •  levocetirizine (XYZAL) 5 MG tablet, Take 5 mg by mouth Every Evening., Disp: , Rfl:   •  montelukast (SINGULAIR) 10 MG tablet, Take 10 mg by mouth Every Night., Disp: , Rfl:   •  multivitamin with minerals tablet tablet, Take 1 tablet by mouth Daily., Disp: , Rfl:   •  nitroglycerin (NITROSTAT) 0.4 MG SL tablet, Place 0.4 mg under the tongue As Needed., Disp: , Rfl:   •  omeprazole (priLOSEC) 20 MG capsule, Take 20 mg by mouth As Needed., Disp: , Rfl:   •  spironolactone (ALDACTONE) 25 MG tablet, 12.5 mg Daily., Disp: , Rfl:     History of Present Illness     Pt presents for follow up of [facility] s/p [facility] ICD. Since we last saw the pt, pt denies any Sustained episodes, SOB, CP, LH, and dizziness. Denies any hospitalizations, ER visits, bleeding, or TIA/CVA symptoms. Overall feels well.  He states overall he is doing well he feels good.  He is follows closely with his primary care physician Dr. Newton    Vitals:    06/28/22 1258   BP: 102/60   BP Location: Right arm   Patient Position: Sitting   Pulse: 78   SpO2: 97%   Weight: 112 kg (248 lb)   Height: 180.3 cm (71\")     Body mass index is 34.59 kg/m².  PE:  General: NAD  Neck: no JVD, no carotid bruits, no TM  Heart RRR, NL S1, S2, S4 present, no rubs, murmurs  Lungs: CTA, no wheezes, rhonchi, or rales  Abd: soft, non-tender, NL BS  Ext: No musculoskeletal deformities, no edema, cyanosis, or clubbing  Psych: normal mood and affect    Diagnostic Data:  [facility] ICD manual device interrogation: VVI @ 40,  <1%. No events. 10 years on battery.       ECG 12 Lead    Date/Time: 6/28/2022 4:14 PM  Performed by: David Garcia PA  Authorized by: David Garcia PA   Rhythm: sinus rhythm  Rate: normal  Conduction: conduction normal  ST Segments: ST segments normal  T Waves: T waves normal  QRS axis: normal  Other: no other findings    Clinical impression: normal ECG            1. Systolic " congestive heart failure, NYHA class 1, unspecified congestive heart failure chronicity (HCC)    2. Nonischemic cardiomyopathy (HCC)    3. IRINA on CPAP        Plan:    1. NICM: s/p VVI ICD. Stable and well compensated. Continue GDMT    2. Chronic SHF:   -Stable FC I-II;   -Continue optimal rx w/ Coreg, Entresto and Aldactone.  Monitor bp closely.   -Monitor daily weights, fluid intake.  Use Lasix as needed  -[facility], 10/19/21, normal coronaries, EF 30-35% with severe global hypokinesis.  -Follows with Dr. Newton in [place]     3. [facility] ICD: Normal function. stable threshold and impedence. 10 years on battery.    4. VF/VF:   -no further events / episodes     Continue current medical therapy, return follow-up her office as scheduled  Electronically signed by PRINCESS Pace, 06/28/22, 4:15 PM EDT.

## 2022-09-20 ENCOUNTER — TELEPHONE (OUTPATIENT)
Dept: CARDIOLOGY | Facility: CLINIC | Age: 52
End: 2022-09-20

## 2022-11-06 PROCEDURE — 93295 DEV INTERROG REMOTE 1/2/MLT: CPT | Performed by: STUDENT IN AN ORGANIZED HEALTH CARE EDUCATION/TRAINING PROGRAM

## 2022-11-06 PROCEDURE — 93296 REM INTERROG EVL PM/IDS: CPT | Performed by: STUDENT IN AN ORGANIZED HEALTH CARE EDUCATION/TRAINING PROGRAM

## 2023-01-10 ENCOUNTER — OFFICE VISIT (OUTPATIENT)
Dept: CARDIOLOGY | Facility: CLINIC | Age: 53
End: 2023-01-10
Payer: MEDICARE

## 2023-01-10 VITALS
BODY MASS INDEX: 35.42 KG/M2 | DIASTOLIC BLOOD PRESSURE: 56 MMHG | WEIGHT: 253 LBS | SYSTOLIC BLOOD PRESSURE: 92 MMHG | HEIGHT: 71 IN | HEART RATE: 86 BPM | OXYGEN SATURATION: 95 %

## 2023-01-10 DIAGNOSIS — I50.20 SYSTOLIC CONGESTIVE HEART FAILURE, NYHA CLASS 1, UNSPECIFIED CONGESTIVE HEART FAILURE CHRONICITY: ICD-10-CM

## 2023-01-10 DIAGNOSIS — I49.01 VENTRICULAR FIBRILLATION: ICD-10-CM

## 2023-01-10 DIAGNOSIS — I42.8 NONISCHEMIC CARDIOMYOPATHY: Primary | ICD-10-CM

## 2023-01-10 PROCEDURE — 93282 PRGRMG EVAL IMPLANTABLE DFB: CPT | Performed by: PHYSICIAN ASSISTANT

## 2023-01-10 PROCEDURE — 99214 OFFICE O/P EST MOD 30 MIN: CPT | Performed by: PHYSICIAN ASSISTANT

## 2023-01-10 NOTE — PROGRESS NOTES
David Michael [place]  1970  [phone]        [facility] CARDIOLOGY MAIN CAMP[facility]       Jayant Newton MD  [address] [place] [id number]    Chief Complaint   Patient presents with   • Systolic congestive heart failure, NYHA class 1, unspecifie       PROBLEM LIST:  1. Nonischemic cardiomyopathy:  a.  Left heart catheterization, circa 2000 by Rohan Bautista MD, showed normal coronary arteries.  b. Echocardiogram, 08/20/2015, showed moderate global hypokinesis, ejection fraction of 25% to 30%.  c. GXT myocardial perfusion, 08/25/2015, showed dilated  LV; no reversible ischemia.   d. Left heart catheterization, 09/08/2015, Dr. Rohan Bautista: Normal coronary arteries, EF 30%. Placement of LifeVest.  e. LHC, 10/19/21, normal coronaries. EF 30-35% with severe global hypokinesis. Initiated on jardiance.   2.  Class I NYHA CHF. Status post Guidant ICD implantation, November 2015.    3. Obesity, BMI 35.   4. Obstructive sleep apnea with CPAP.   5. Asthma.   6. GERD.   7. History of vertigo.    Allergies  Allergies   Allergen Reactions   • Amoxicillin Anaphylaxis     difficulting breathing       Current Medications    Current Outpatient Medications:   •  Acetaminophen (TYLENOL ARTHRITIS PAIN PO), Take 2 tablets by mouth As Needed., Disp: , Rfl:   •  albuterol sulfate  (90 Base) MCG/ACT inhaler, Inhale 1 puff As Needed., Disp: , Rfl:   •  ALLERGY SERUM INJECTION, Inject  under the skin into the appropriate area as directed 1 (One) Time. I shot weekly, Disp: , Rfl:   •  azelastine (ASTEPRO) 0.15 % solution nasal spray, USE ONE SPRAY INTO EACH NOSTRIL ONCE TO TWICE DAILY AS NEEDED FOR ALLERGY SYMPTOMS, Disp: , Rfl:   •  carvedilol (COREG) 25 MG tablet, Take 1 tablet by mouth 2 (two) times a day., Disp: , Rfl:   •  cyclobenzaprine (FLEXERIL) 10 MG tablet, As Needed., Disp: , Rfl:   •  ENTRESTO  MG tablet, Take 1 tablet by mouth 2 (Two) Times a Day., Disp: , Rfl: 0  •  furosemide  (LASIX) 20 MG tablet, Take 20 mg by mouth Daily., Disp: , Rfl:   •  levocetirizine (XYZAL) 5 MG tablet, Take 5 mg by mouth Every Evening., Disp: , Rfl:   •  montelukast (SINGULAIR) 10 MG tablet, Take 10 mg by mouth Every Night., Disp: , Rfl:   •  multivitamin with minerals tablet tablet, Take 1 tablet by mouth Daily., Disp: , Rfl:   •  nitroglycerin (NITROSTAT) 0.4 MG SL tablet, Place 0.4 mg under the tongue As Needed., Disp: , Rfl:   •  omeprazole (priLOSEC) 20 MG capsule, Take 20 mg by mouth As Needed., Disp: , Rfl:   •  spironolactone (ALDACTONE) 25 MG tablet, 12.5 mg Daily., Disp: , Rfl:     History of Present Illness     Pt presents for follow up of [facility] s/p [facility]. Since we last saw the pt, pt denies any Sustained Palpitations episodes, worsening SOB or Chest pain.  Denies any hospitalizations, ER visits, bleeding, or TIA/CVA symptoms. Overall feels well. He still walks for exercise everyday. He has some dizziness from the medications, with orthostatisis due to low bp but he tolerates.     Vitals:    01/10/23 1520   BP: 92/56   BP Location: Left arm   Patient Position: Sitting   Pulse: 86   SpO2: 95%   Weight: 115 kg (253 lb)   Height: 180.3 cm (71\")     Body mass index is 35.29 kg/m².  PE:  General: NAD  Neck: no JVD, no carotid bruits, no TM  Heart RRR, NL S1, S2, S4 present, no rubs, murmurs  Lungs: CTA, no wheezes, rhonchi, or rales  Abd: soft, non-tender, NL BS  Ext: No musculoskeletal deformities, trace edema, cyanosis, or clubbing  Psych: normal mood and affect    Diagnostic Data:  [facility] manual device interrogation: VVI @ 40,  <1%. No events. 10 years on battery.     Procedures    1. Nonischemic cardiomyopathy (HCC)    2. Systolic congestive heart failure, NYHA class 1, unspecified congestive heart failure chronicity (HCC)    3. Ventricular fibrillation (HCC)        Plan:    1. NICM: s/p VVI ICD. Stable and well compensated. Continue GDMT    2. Chronic SHF:   -Stable FC I-II;   -Continue optimal rx  w/ Coreg, Entresto and Aldactone. Could not tolerate Jardiance.   Monitor bp closely.   -Monitor daily weights, fluid intake.  on Lasix daily and extra dose as needed.   -[facility], 10/19/21, normal coronaries, EF 30-35% with severe global hypokinesis.  -Follows with Dr. Newton in [place]     3. [facility] ICD: Normal function. stable threshold and impedence. 10 years on battery.    4. VF/VF:   -no further events / episodes       Stable CV course. Consider follow up Echo, optimizer.   Follow up 8 months Dr. Parra   Electronically signed by PRINCESS Pace, 01/10/23, 3:46 PM EST.

## 2023-05-07 PROCEDURE — 93296 REM INTERROG EVL PM/IDS: CPT | Performed by: STUDENT IN AN ORGANIZED HEALTH CARE EDUCATION/TRAINING PROGRAM

## 2023-05-07 PROCEDURE — 93295 DEV INTERROG REMOTE 1/2/MLT: CPT | Performed by: STUDENT IN AN ORGANIZED HEALTH CARE EDUCATION/TRAINING PROGRAM

## 2023-08-06 PROCEDURE — 93295 DEV INTERROG REMOTE 1/2/MLT: CPT | Performed by: STUDENT IN AN ORGANIZED HEALTH CARE EDUCATION/TRAINING PROGRAM

## 2023-08-06 PROCEDURE — 93296 REM INTERROG EVL PM/IDS: CPT | Performed by: STUDENT IN AN ORGANIZED HEALTH CARE EDUCATION/TRAINING PROGRAM

## 2023-09-12 ENCOUNTER — OFFICE VISIT (OUTPATIENT)
Dept: CARDIOLOGY | Facility: CLINIC | Age: 53
End: 2023-09-12
Payer: MEDICARE

## 2023-09-12 VITALS
SYSTOLIC BLOOD PRESSURE: 102 MMHG | HEIGHT: 71 IN | OXYGEN SATURATION: 96 % | DIASTOLIC BLOOD PRESSURE: 62 MMHG | BODY MASS INDEX: 35.28 KG/M2 | WEIGHT: 252 LBS | HEART RATE: 72 BPM

## 2023-09-12 DIAGNOSIS — I49.01 VENTRICULAR FIBRILLATION: Primary | ICD-10-CM

## 2023-09-12 DIAGNOSIS — I42.8 NONISCHEMIC CARDIOMYOPATHY: ICD-10-CM

## 2023-09-12 DIAGNOSIS — Z95.810 ICD (IMPLANTABLE CARDIOVERTER-DEFIBRILLATOR), DUAL, IN SITU: Primary | ICD-10-CM

## 2023-09-12 PROCEDURE — 93000 ELECTROCARDIOGRAM COMPLETE: CPT | Performed by: STUDENT IN AN ORGANIZED HEALTH CARE EDUCATION/TRAINING PROGRAM

## 2023-09-12 PROCEDURE — 93282 PRGRMG EVAL IMPLANTABLE DFB: CPT | Performed by: STUDENT IN AN ORGANIZED HEALTH CARE EDUCATION/TRAINING PROGRAM

## 2023-09-12 PROCEDURE — 99214 OFFICE O/P EST MOD 30 MIN: CPT | Performed by: STUDENT IN AN ORGANIZED HEALTH CARE EDUCATION/TRAINING PROGRAM

## 2023-09-12 RX ORDER — BECLOMETHASONE DIPROPIONATE HFA 80 UG/1
2 AEROSOL, METERED RESPIRATORY (INHALATION) 2 TIMES DAILY
COMMUNITY
Start: 2023-08-29

## 2023-09-12 NOTE — PROGRESS NOTES
Cardiac Electrophysiology Outpatient Note  [facility]    Office Visit     David Ravi  [id number]  09/12/2023    Primary Care Physician: Jayant Newton MD    Referred By: No ref. provider found    Subjective     Chief Complaint   Patient presents with    Nonischemic cardiomyopathy       History of Present Illness:   David Ravi is a 53 y.o. male who presents to my electrophysiology clinic for follow up of nonischemic cardiomyopathy status post single-chamber ICD.  He was last seen in clinic approximately 8 months ago.  He is overall done well since then.  Denies any new symptoms.  He does get some dizziness and lightheadedness on standing.  Also has some vertigo.  He has some shortness of breath but thinks this is related to asthma.  He started on a new inhaler.  At the current time he can walk about 20 minutes on a flat surface.  He can go up about 1-2 flights of stairs.  Does occasionally have palpitations.  No syncopal events.  A little bit of discomfort over his ICD site..      Past Medical History:   Diagnosis Date    Asthma     CHF NYHA class I     Coronary artery disease 2000    GERD (gastroesophageal reflux disease)     Heart disease     History of vertigo     Hyperlipidemia     Nonischemic cardiomyopathy     Obesity     IRINA on CPAP        Past Surgical History:   Procedure Laterality Date    CARDIAC CATHETERIZATION      CARDIAC CATHETERIZATION Left 10/19/2021    Procedure: Left Heart Cath;  Surgeon: Shankar Sanchez MD;  Location: [facility] CATH INVASIVE LOCATION;  Service: Cardiovascular;  Laterality: Left;    CARDIAC DEFIBRILLATOR PLACEMENT  2015    COLONOSCOPY         Family History   Problem Relation Age of Onset    Colon cancer Mother     Heart disease Father     Heart attack Father     No Known Problems Sister     Alcohol abuse Paternal Uncle     Alcohol abuse Maternal Grandfather     Alzheimer's disease Paternal Grandmother         Social History     Socioeconomic History    Marital status:    Tobacco Use    Smoking status: Never    Smokeless tobacco: Never   Vaping Use    Vaping Use: Never used    Passive vaping exposure: Yes   Substance and Sexual Activity    Alcohol use: No    Drug use: No    Sexual activity: Yes     Partners: Female         Current Outpatient Medications:     Acetaminophen (TYLENOL ARTHRITIS PAIN PO), Take 2 tablets by mouth As Needed., Disp: , Rfl:     albuterol sulfate  (90 Base) MCG/ACT inhaler, Inhale 1 puff As Needed., Disp: , Rfl:     ALLERGY SERUM INJECTION, Inject  under the skin into the appropriate area as directed 1 (One) Time. I shot weekly, Disp: , Rfl:     azelastine (ASTEPRO) 0.15 % solution nasal spray, USE ONE SPRAY INTO EACH NOSTRIL ONCE TO TWICE DAILY AS NEEDED FOR ALLERGY SYMPTOMS, Disp: , Rfl:     carvedilol (COREG) 25 MG tablet, Take 1 tablet by mouth 2 (two) times a day., Disp: , Rfl:     cyclobenzaprine (FLEXERIL) 10 MG tablet, As Needed., Disp: , Rfl:     ENTRESTO  MG tablet, Take 1 tablet by mouth 2 (Two) Times a Day., Disp: , Rfl: 0    furosemide (LASIX) 20 MG tablet, Take 1 tablet by mouth Daily., Disp: , Rfl:     levocetirizine (XYZAL) 5 MG tablet, Take 1 tablet by mouth Every Evening., Disp: , Rfl:     montelukast (SINGULAIR) 10 MG tablet, Take 1 tablet by mouth Every Night., Disp: , Rfl:     multivitamin with minerals tablet tablet, Take 1 tablet by mouth Daily., Disp: , Rfl:     nitroglycerin (NITROSTAT) 0.4 MG SL tablet, Place 1 tablet under the tongue As Needed., Disp: , Rfl:     omeprazole (priLOSEC) 20 MG capsule, Take 1 capsule by mouth As Needed., Disp: , Rfl:     Qvar RediHaler 80 MCG/ACT inhaler, Inhale 2 puffs 2 (Two) Times a Day., Disp: , Rfl:     spironolactone (ALDACTONE) 25 MG tablet, 0.5 tablets Daily., Disp: , Rfl:     Allergies:   Allergies   Allergen Reactions    Amoxicillin Anaphylaxis     difficulting breathing       Objective   Vital Signs: Blood  "pressure 102/62, pulse 72, height 180.3 cm (71\"), weight 114 kg (252 lb), SpO2 96 %.    PHYSICAL EXAM  General appearance: Awake, alert, cooperative  Head: Normocephalic, without obvious abnormality, atraumatic  Neck: No JVD  Lungs: Clear to ascultation bilaterally  Heart: Regular rate and rhythm, no murmurs, 2+ LE pulses, no lower extremity swelling  Skin: Skin color, turgor normal, no rashes or lesions  Neurologic: Grossly normal     Lab Results   Component Value Date    GLUCOSE 110 (H) 10/19/2021    CALCIUM 8.9 10/19/2021     10/19/2021    K 3.6 10/19/2021    CO2 26.0 10/19/2021     10/19/2021    BUN 9 10/19/2021    CREATININE 0.85 10/19/2021    EGFRIFNONA 96 10/19/2021    BCR 10.6 10/19/2021    ANIONGAP 11.0 10/19/2021     Lab Results   Component Value Date    WBC 8.12 10/19/2021    HGB 16.3 10/19/2021    HCT 50.0 10/19/2021    MCV 89.8 10/19/2021     10/19/2021     No results found for: INR, PROTIME  Lab Results   Component Value Date    TSH 0.679 09/13/2021                 I personally viewed and interpreted the patient's EKG/Telemetry/lab data      ECG 12 Lead    Date/Time: 9/12/2023 4:05 PM  Performed by: Robert Parra MD  Authorized by: Robert Parra MD   Comparison: compared with previous ECG from 6/20/2022  Similar to previous ECG  Comments: Sinus rhythm, nonspecific T wave changes        David Ravi  reports that he has never smoked. He has never used smokeless tobacco.. I  Assessment & Plan    1. ICD (implantable cardioverter-defibrillator), single, in situ  His [employer] single-chamber ICD was interrogated is functioning well.  Is approximately 9 years of battery life remaining.  He is pacing less than 1% of time in the ventricle.  Pacing and sensing thresholds within normal limits.  He has single run of nonsustained VT the last approximately 3 seconds.  This was quite fast with a maximum rate of 160 bpm.  We will continue to monitor.    2. Nonischemic " cardiomyopathy  Has history of nonischemic cardiomyopathy.  Unsure what his ejection fraction is.  He had left heart catheter a few years ago showing EF around 30 to 35%.  We will try to obtain a repeat echocardiogram with his primary cardiologist to document what his EF is now.  We did discuss that should he have significant symptoms related to heart failure, we could consider something such as the optimizer device.  At the current time, I am not sure if he is a candidate for this as his symptoms seem to be more NYHA class II as opposed to class III.       Follow Up:  No follow-ups on file.      Thank you for allowing me to participate in the care of your patient. Please do not hesitate to contact me with additional questions or concerns.      Robert Parra M.D.  Cardiac Electrophysiologist  [facility]

## 2023-10-24 ENCOUNTER — HOSPITAL ENCOUNTER (OUTPATIENT)
Dept: CARDIOLOGY | Facility: HOSPITAL | Age: 53
Discharge: HOME | End: 2023-10-24
Admitting: STUDENT IN AN ORGANIZED HEALTH CARE EDUCATION/TRAINING PROGRAM
Payer: MEDICARE

## 2023-10-24 DIAGNOSIS — I49.01 VENTRICULAR FIBRILLATION: ICD-10-CM

## 2023-10-24 LAB
BH CV ECHO LEFT VENTRICLE GLOBAL LONGITUDINAL STRAIN: -14.5 %
BH CV ECHO MEAS - AO MAX PG: 5.4 MMHG
BH CV ECHO MEAS - AO MEAN PG: 3 MMHG
BH CV ECHO MEAS - AO ROOT DIAM: 3.6 CM
BH CV ECHO MEAS - AO V2 MAX: 116 CM/SEC
BH CV ECHO MEAS - AO V2 VTI: 23.2 CM
BH CV ECHO MEAS - AVA(I,D): 3.7 CM2
BH CV ECHO MEAS - EDV(CUBED): 287.5 ML
BH CV ECHO MEAS - EDV(MOD-SP2): 184 ML
BH CV ECHO MEAS - EDV(MOD-SP4): 185 ML
BH CV ECHO MEAS - EF(MOD-BP): 42.9 %
BH CV ECHO MEAS - EF(MOD-SP2): 44.6 %
BH CV ECHO MEAS - EF(MOD-SP4): 42.2 %
BH CV ECHO MEAS - ESV(CUBED): 221.7 ML
BH CV ECHO MEAS - ESV(MOD-SP2): 102 ML
BH CV ECHO MEAS - ESV(MOD-SP4): 107 ML
BH CV ECHO MEAS - FS: 8.3 %
BH CV ECHO MEAS - IVS/LVPW: 0.91 CM
BH CV ECHO MEAS - IVSD: 1 CM
BH CV ECHO MEAS - LA DIMENSION: 4.6 CM
BH CV ECHO MEAS - LAT PEAK E' VEL: 14.4 CM/SEC
BH CV ECHO MEAS - LV MASS(C)D: 309.3 GRAMS
BH CV ECHO MEAS - LV MAX PG: 3.5 MMHG
BH CV ECHO MEAS - LV MEAN PG: 2 MMHG
BH CV ECHO MEAS - LV V1 MAX: 93 CM/SEC
BH CV ECHO MEAS - LV V1 VTI: 18.2 CM
BH CV ECHO MEAS - LVIDD: 6.6 CM
BH CV ECHO MEAS - LVIDS: 6.1 CM
BH CV ECHO MEAS - LVOT AREA: 4.8 CM2
BH CV ECHO MEAS - LVOT DIAM: 2.46 CM
BH CV ECHO MEAS - LVPWD: 1.1 CM
BH CV ECHO MEAS - MED PEAK E' VEL: 7.2 CM/SEC
BH CV ECHO MEAS - MV A MAX VEL: 84.5 CM/SEC
BH CV ECHO MEAS - MV DEC TIME: 0.16 SEC
BH CV ECHO MEAS - MV E MAX VEL: 88.2 CM/SEC
BH CV ECHO MEAS - MV E/A: 1.04
BH CV ECHO MEAS - PA ACC TIME: 0.19 SEC
BH CV ECHO MEAS - PA V2 MAX: 97.7 CM/SEC
BH CV ECHO MEAS - SV(LVOT): 86.8 ML
BH CV ECHO MEAS - SV(MOD-SP2): 82 ML
BH CV ECHO MEAS - SV(MOD-SP4): 78 ML
BH CV ECHO MEAS - TAPSE (>1.6): 2.7 CM
BH CV ECHO MEASUREMENTS AVERAGE E/E' RATIO: 8.17
BH CV XLRA - TDI S': 15 CM/SEC
LEFT ATRIUM VOLUME INDEX: 36.5 ML/M2
LV EF 2D ECHO EST: 40 %

## 2023-10-24 PROCEDURE — 93306 TTE W/DOPPLER COMPLETE: CPT | Performed by: INTERNAL MEDICINE

## 2023-10-24 PROCEDURE — 93356 MYOCRD STRAIN IMG SPCKL TRCK: CPT

## 2023-10-24 PROCEDURE — 93356 MYOCRD STRAIN IMG SPCKL TRCK: CPT | Performed by: INTERNAL MEDICINE

## 2023-10-24 PROCEDURE — 93306 TTE W/DOPPLER COMPLETE: CPT

## 2023-10-24 PROCEDURE — 25010000002 SULFUR HEXAFLUORIDE MICROSPH 60.7-25 MG RECONSTITUTED SUSPENSION: Performed by: STUDENT IN AN ORGANIZED HEALTH CARE EDUCATION/TRAINING PROGRAM

## 2023-10-24 RX ADMIN — SULFUR HEXAFLUORIDE 3 ML: KIT at 16:38

## 2023-10-25 ENCOUNTER — TELEPHONE (OUTPATIENT)
Dept: CARDIOLOGY | Facility: CLINIC | Age: 53
End: 2023-10-25
Payer: MEDICARE

## 2023-10-25 NOTE — TELEPHONE ENCOUNTER
----- Message from Robert Parra MD sent at 10/25/2023 12:12 PM EDT -----  Can we let him know that I reviewed his recent echo.  Ejection fraction is up to around 43%, which is improved from the last one that we had.  ----- Message -----  From: Jean Paul Paredes MD  Sent: 10/24/2023   5:39 PM EDT  To: Robert Parra MD

## 2024-01-09 ENCOUNTER — TELEPHONE (OUTPATIENT)
Dept: CARDIOLOGY | Facility: CLINIC | Age: 54
End: 2024-01-09
Payer: MEDICARE

## 2024-01-09 NOTE — TELEPHONE ENCOUNTER
Caller: David Ravi    Relationship: Self    Best call back number: [phone]     What is the best time to reach you: ANYTIME     What was the call regarding: PATIENT STATED THAT HE RECEIVED A SHOCK FROM HIS DEFIBRILLATOR AFTER HOURS YESTERDAY AND HE WOULD LIKE TO KNOW WHAT HE NEEDS TO DO ABOUT THIS. PATIENT STATED HE HAS A MACHINE THAT MONITORS HIS DEVICE BUT IS UNSURE IF IT IS WORKING SINCE LAST TIME HE RECEIVED A CALL WHEN THIS HAPPENED.     PATIENT STATED HE IS NOT HAVING ANY SYMPTOMS     Is it okay if the provider responds through MyChart: PREFERS A CALL

## 2024-01-09 NOTE — TELEPHONE ENCOUNTER
Called Mr Ravi to let him know of sooner f/u.  Left message letting him know scheduling would contact him for a f/u.

## 2024-01-09 NOTE — TELEPHONE ENCOUNTER
Called Mr Ravi and he sent in a remote reading showing a shock from yesterday.  He reports he was carrying firewood to his porch when his defibrillator fired.  He flet ok afterwards and feels ok today.  He is compliant with all of his medications.

## 2024-01-26 ENCOUNTER — TELEPHONE (OUTPATIENT)
Dept: CARDIOLOGY | Facility: CLINIC | Age: 54
End: 2024-01-26
Payer: MEDICARE

## 2024-01-26 NOTE — TELEPHONE ENCOUNTER
Per [employer] remote cardiac device transmission received today, 1/26/2024:     On 1/21/2024, ATP X 1 delivered for VF followed by a successful shock.    V-rates: 269 bpm.     Additionally, appropriate & successful ATP X 1 for each VF episode.  Episodes: 2 one on each day of  1/20/204 & 1/21/2024.  V-rates: 250-254 bpm.    [facility]     Next follow up appointment w/Dr. Parra is 1/30/2024; reiterated with patient the date/time/location of the appointment & stressed the importance of attending. Pt verbalized that he was aware of the appointment & verbalized emphatically that he would attend.    Pt reports he was in his usual state of health, no recent illness. Pt reports he was aware of the shock but did not lose consciousness or sustain injury.  Advised pt that if he receives two shocks in a 24 hour period to present to the nearest emergency room-p[t verbalized understanding & compliance.

## 2024-01-30 ENCOUNTER — OFFICE VISIT (OUTPATIENT)
Dept: CARDIOLOGY | Facility: CLINIC | Age: 54
End: 2024-01-30
Payer: MEDICARE

## 2024-01-30 VITALS
HEIGHT: 71 IN | OXYGEN SATURATION: 95 % | HEART RATE: 94 BPM | WEIGHT: 255 LBS | BODY MASS INDEX: 35.7 KG/M2 | SYSTOLIC BLOOD PRESSURE: 84 MMHG | DIASTOLIC BLOOD PRESSURE: 50 MMHG

## 2024-01-30 DIAGNOSIS — I42.8 NONISCHEMIC CARDIOMYOPATHY: Primary | ICD-10-CM

## 2024-01-30 DIAGNOSIS — I42.8 NONISCHEMIC CARDIOMYOPATHY: ICD-10-CM

## 2024-01-30 DIAGNOSIS — Z45.02 ICD (IMPLANTABLE CARDIOVERTER-DEFIBRILLATOR) DISCHARGE: ICD-10-CM

## 2024-01-30 DIAGNOSIS — I49.9 VENTRICULAR ARRHYTHMIA: Primary | ICD-10-CM

## 2024-01-30 DIAGNOSIS — I49.9 VENTRICULAR ARRHYTHMIA: ICD-10-CM

## 2024-01-30 DIAGNOSIS — Z95.810 CARDIAC DEFIBRILLATOR IN PLACE: ICD-10-CM

## 2024-01-30 PROCEDURE — 93282 PRGRMG EVAL IMPLANTABLE DFB: CPT | Performed by: STUDENT IN AN ORGANIZED HEALTH CARE EDUCATION/TRAINING PROGRAM

## 2024-01-30 PROCEDURE — 99214 OFFICE O/P EST MOD 30 MIN: CPT | Performed by: STUDENT IN AN ORGANIZED HEALTH CARE EDUCATION/TRAINING PROGRAM

## 2024-01-30 RX ORDER — AMIODARONE HYDROCHLORIDE 200 MG/1
200 TABLET ORAL DAILY
Qty: 90 TABLET | Refills: 1 | Status: SHIPPED | OUTPATIENT
Start: 2024-01-30

## 2024-01-30 NOTE — PROGRESS NOTES
Cardiac Electrophysiology Outpatient Note  [facility]    Office Visit     David Ravi  [id number]  01/30/2024    Primary Care Physician: Jayant Newton MD    Referred By: No ref. provider found    Subjective     Chief Complaint   Patient presents with    Nonischemic cardiomyopathy       History of Present Illness:   David Ravi is a 53 y.o. male who presents to my electrophysiology clinic for follow up of  nonischemic cardiomyopathy status post single-chamber ICD.  He was last seen in clinic in September 2023.  At that time he had experienced no ICD shocks.  In October he had his first ICD shock while walking to the bathroom.  He felt heart pounding and then felt as if he was about to pass out before receiving shock therapy.  He had 2 similar episodes in January.  He is also been dealing with a sinus infection and URI symptoms recently.  He had a repeat echocardiogram in October that showed slight improvement of his LV systolic function with LVEF 43%.  Over the past 1 to 2 weeks he has felt more fatigued but is not experiencing any chest pain or palpitations.  He has been intermittently dizzy and slightly more short of breath over that period as well especially when lying flat.  He has had a dry cough for around a year.    Past Medical History:   Diagnosis Date    Asthma     CHF NYHA class I     Coronary artery disease 2000    GERD (gastroesophageal reflux disease)     Heart disease     History of vertigo     Hyperlipidemia     Nonischemic cardiomyopathy     Obesity     IRINA on CPAP     Ventricular arrhythmia 1/30/2024       Past Surgical History:   Procedure Laterality Date    CARDIAC CATHETERIZATION      CARDIAC CATHETERIZATION Left 10/19/2021    Procedure: Left Heart Cath;  Surgeon: Shankar Sanchez MD;  Location: [facility] CATH INVASIVE LOCATION;  Service: Cardiovascular;  Laterality: Left;    CARDIAC DEFIBRILLATOR PLACEMENT  2015    COLONOSCOPY          Family History   Problem Relation Age of Onset    Colon cancer Mother     Heart disease Father     Heart attack Father     No Known Problems Sister     Alcohol abuse Paternal Uncle     Alcohol abuse Maternal Grandfather     Alzheimer's disease Paternal Grandmother        Social History     Socioeconomic History    Marital status:    Tobacco Use    Smoking status: Never     Passive exposure: Past    Smokeless tobacco: Never   Vaping Use    Vaping Use: Never used    Passive vaping exposure: Yes   Substance and Sexual Activity    Alcohol use: No    Drug use: No    Sexual activity: Yes     Partners: Female         Current Outpatient Medications:     Acetaminophen (TYLENOL ARTHRITIS PAIN PO), Take 2 tablets by mouth As Needed., Disp: , Rfl:     albuterol sulfate  (90 Base) MCG/ACT inhaler, Inhale 1 puff As Needed., Disp: , Rfl:     ALLERGY SERUM INJECTION, Inject  under the skin into the appropriate area as directed 1 (One) Time. I shot weekly, Disp: , Rfl:     azelastine (ASTEPRO) 0.15 % solution nasal spray, USE ONE SPRAY INTO EACH NOSTRIL ONCE TO TWICE DAILY AS NEEDED FOR ALLERGY SYMPTOMS, Disp: , Rfl:     carvedilol (COREG) 25 MG tablet, Take 1 tablet by mouth 2 (two) times a day., Disp: , Rfl:     cyclobenzaprine (FLEXERIL) 10 MG tablet, As Needed., Disp: , Rfl:     ENTRESTO  MG tablet, Take 1 tablet by mouth 2 (Two) Times a Day., Disp: , Rfl: 0    furosemide (LASIX) 20 MG tablet, Take 1 tablet by mouth Daily., Disp: , Rfl:     levocetirizine (XYZAL) 5 MG tablet, Take 1 tablet by mouth Every Evening., Disp: , Rfl:     montelukast (SINGULAIR) 10 MG tablet, Take 1 tablet by mouth Every Night., Disp: , Rfl:     multivitamin with minerals tablet tablet, Take 1 tablet by mouth Daily., Disp: , Rfl:     nitroglycerin (NITROSTAT) 0.4 MG SL tablet, Place 1 tablet under the tongue As Needed., Disp: , Rfl:     omeprazole (priLOSEC) 20 MG capsule, Take 1 capsule by mouth As Needed., Disp: , Rfl:      "spironolactone (ALDACTONE) 25 MG tablet, 0.5 tablets Daily., Disp: , Rfl:     amiodarone (PACERONE) 200 MG tablet, Take 1 tablet by mouth Daily. Take 2 tabs twice daily for 2 weeks then 1 tablet daily, Disp: 90 tablet, Rfl: 1    Qvar RediHaler 80 MCG/ACT inhaler, Inhale 2 puffs 2 (Two) Times a Day. (Patient not taking: Reported on 1/30/2024), Disp: , Rfl:     Allergies:   Allergies   Allergen Reactions    Amoxicillin Anaphylaxis     difficulting breathing       Objective   Vital Signs: Blood pressure (!) 84/50, pulse 94, height 180.3 cm (71\"), weight 116 kg (255 lb), SpO2 95%.    PHYSICAL EXAM  General appearance: Awake, alert, cooperative  Head: Normocephalic, without obvious abnormality, atraumatic  Lungs: Clear to ascultation bilaterally  Heart: Regular rate and rhythm, no murmurs, 2+ LE pulses, no lower extremity swelling  Skin: Skin color, turgor normal, no rashes or lesions  Neurologic: Grossly normal     Lab Results   Component Value Date    GLUCOSE 110 (H) 10/19/2021    CALCIUM 8.9 10/19/2021     10/19/2021    K 3.6 10/19/2021    CO2 26.0 10/19/2021     10/19/2021    BUN 9 10/19/2021    CREATININE 0.85 10/19/2021    EGFRIFNONA 96 10/19/2021    BCR 10.6 10/19/2021    ANIONGAP 11.0 10/19/2021     Lab Results   Component Value Date    WBC 8.12 10/19/2021    HGB 16.3 10/19/2021    HCT 50.0 10/19/2021    MCV 89.8 10/19/2021     10/19/2021     No results found for: \"INR\", \"PROTIME\"  Lab Results   Component Value Date    TSH 0.679 09/13/2021          Results for orders placed during the hospital encounter of 10/24/23    Adult Transthoracic Echo Complete W/ Cont if Necessary Per Protocol    Interpretation Summary    Left ventricular systolic function is mildly decreased. Calculated left ventricular EF = 42.9% Abnormal global longitudinal LV strain (GLS) = -14.5%. Left ventricle strain data was reviewed by the physician and found to be accurate. Normal left ventricular wall thickness noted. The " left ventricular cavity is moderately dilated. There is left ventricular global hypokinesis noted. No evidence of a left ventricular thrombus present.    Normal right ventricular cavity size, wall thickness, systolic function and septal motion noted.    The aortic valve is grossly normal in structure. The aortic valve appears trileaflet. No significant aortic valve regurgitation is present. No hemodynamically significant aortic valve stenosis is present.    The mitral valve is structurally normal with no significant stenosis present. Trace mitral valve regurgitation is present.    The tricuspid valve is grossly normal in structure. Physiologic tricuspid valve regurgitation is present. No evidence of significant tricuspid valve stenosis is present.     Results for orders placed during the hospital encounter of 10/19/21    Cardiac Catheterization/Vascular Study    Narrative  Table formatting from the original result was not included.  Images from the original result were not included.  [facility] AT [address], [address]  [place], [place], [address] [phone]  [email or web address]          CARDIAC CATHETERIZATION PROCEDURE NOTE    Impression  · Normal coronary arteries.  · Nonischemic cardiomyopathy, LVEF 30-35% with severe global hypokinesis.  · LVEDP 14 mmHg.    RECOMMENDATIONS:  · Continue guideline directed medical therapy with carvedilol and Entresto  · Start Jardiance 10 mg daily  · Consider adding spironolactone in the near future if blood pressure will tolerate.    ----------------------------------------------------------------------------------------------------------------------    Indication(s) for this Procedure:  Ventricular fibrillation with known cardiomyopathy.    Procedure(s) Performed:  1.  Right radial artery access .  2. Selective coronary angiography  3. Left heart catheterization.  4. Left ventriculography.    Description of the Procedure:  Informed  consent was obtained with the goals, rationale, alternatives, risks and benefits of the procedure explained to the patient.  A 6Fr Terumo slender sheath was placed in the right radial artery. Selective angiography of the right coronary artery was performed with a 5Fr JR4 diagnostic catheter.  Selective angiography of the left coronary arteries was performed with a 5Fr JL3.5 diagnostic catheter.  Left heart catheterization with left ventriculography was performed with a 5Fr pigtail catheter placed in the left ventricle.  The procedure was completed and the sheath was removed. A radial patent hemostasis band was placed for hemostasis.      Angiographic Findings:  Right coronary dominant circulation  · Left main artery:   Large-caliber vessel trifurcates into an LAD, ramus, and circumflex, normal.  · Left anterior descending artery: Large caliber vessel gives rise to several small diagonals, normal.  · Left circumflex artery: Large-caliber nondominant vessel gives rise to one moderate OM branch, then continues in the AV groove to supply several small posterior lateral branches, normal  · Ramus intermedius: Large-caliber, normal.  · Right coronary artery: Large-caliber dominant, gives rise to PDA to PLV, normal.    Left Ventricle: LVEF 30-35% with severe global hypokinesis.    Hemodynamic Findings:  · Ao pressure: 81/56 mmHg  · LVEDP: 14 mmHg  · LV to Ao pullback peak to peak gradient: 0 mmHg    Estimated Blood Loss: Minimal    Specimen(s): None obtained    Sheath: Removed, radial patent hemostasis band was placed for hemostasis.    Complications: There were no apparent early complications.    Shankar Sanchez MD, [facility]  Interventional Cardiology  [facility] personally viewed and interpreted the patient's EKG/Telemetry/lab data    Procedures    David Ravi  reports that he has never smoked. He has been exposed to tobacco smoke. He has never used smokeless tobacco..          Assessment & Plan    1. Nonischemic cardiomyopathy  NYHA class II-III symptoms.  Most recent echocardiogram October 2023 showing LVEF 43% improved from prior.  GDMT includes Coreg, Entresto and spironolactone.  Patient notably hypotensive in the office today.  I instructed him to check and log of his blood pressure at home and report back to either us or Dr. Hansen to see if some of his GDMT needs to be decreased    2. Cardiac defibrillator in place  Single-chamber defibrillator with multiple appropriate therapies including 3 defibrillations and some ATP.  Addressed below otherwise his device is functioning normally.  He has approximately 8 years of battery life remaining.  Pacing and sensing factors within normal limits.    3. Ventricular arrhythmia/ ICD (implantable cardioverter-defibrillator) discharge  Patient had multiple episodes of fast ventricular tachycardia on his device.  These appear to be relatively monomorphic but with rates up to 260 bpm.  We discussed the pathophysiology of ventricular tachycardia and nonischemic cardiomyopathy and treatment options.  Given that he has had now 2 shocks over the past 2 weeks I deftly think this requires further therapy.  We discussed the options of catheter ablation as well as antiarrhythmic agents.  For catheter ablation we discussed how this performed include likely of success.  He is potentially interested in this to avoid long-term medications if possible.  With plans to proceed with an ablation, will obtain a cardiac MRI at  (wide band sequence) as well as cardiac CT. this will help in planning ablation.  Since he has had 2 shocks in the past 2 weeks, I would not want to defer starting antiarrhythmic therapy in the short-term prevent future shocks.  Will therefore start him on amiodarone 400 mg twice daily for 10 days followed by 200 mg daily.  This will hopefully be short-term therapy and we would like to discontinue this potentially after  ablation.    Follow Up:  Return in about 6 months (around 7/30/2024) for Recheck.      Thank you for allowing me to participate in the care of your patient. Please do not hesitate to contact me with additional questions or concerns.     Partial scribed by Barrett Ames PA-C for:  Robert Parra M.D.  Cardiac Electrophysiologist  [facility] / [facility]

## 2024-02-07 PROCEDURE — 93295 DEV INTERROG REMOTE 1/2/MLT: CPT | Performed by: STUDENT IN AN ORGANIZED HEALTH CARE EDUCATION/TRAINING PROGRAM

## 2024-02-07 PROCEDURE — 93296 REM INTERROG EVL PM/IDS: CPT | Performed by: STUDENT IN AN ORGANIZED HEALTH CARE EDUCATION/TRAINING PROGRAM

## 2024-02-23 ENCOUNTER — APPOINTMENT (OUTPATIENT)
Dept: GENERAL RADIOLOGY | Facility: HOSPITAL | Age: 54
End: 2024-02-23
Payer: MEDICARE

## 2024-02-23 ENCOUNTER — HOSPITAL ENCOUNTER (EMERGENCY)
Facility: HOSPITAL | Age: 54
Discharge: HOME | End: 2024-02-23
Attending: EMERGENCY MEDICINE
Payer: MEDICARE

## 2024-02-23 ENCOUNTER — TELEPHONE (OUTPATIENT)
Dept: CARDIOLOGY | Facility: CLINIC | Age: 54
End: 2024-02-23
Payer: MEDICARE

## 2024-02-23 ENCOUNTER — HOSPITAL ENCOUNTER (OUTPATIENT)
Dept: CT IMAGING | Facility: HOSPITAL | Age: 54
Discharge: HOME | End: 2024-02-23
Payer: MEDICARE

## 2024-02-23 VITALS
DIASTOLIC BLOOD PRESSURE: 71 MMHG | SYSTOLIC BLOOD PRESSURE: 106 MMHG | WEIGHT: 245 LBS | RESPIRATION RATE: 17 BRPM | TEMPERATURE: 97.7 F | BODY MASS INDEX: 34.3 KG/M2 | HEART RATE: 89 BPM | HEIGHT: 71 IN | OXYGEN SATURATION: 95 %

## 2024-02-23 DIAGNOSIS — I49.01 VENTRICULAR FIBRILLATION: ICD-10-CM

## 2024-02-23 DIAGNOSIS — I42.8 NONISCHEMIC CARDIOMYOPATHY: ICD-10-CM

## 2024-02-23 DIAGNOSIS — Z45.02 AICD DISCHARGE: Primary | ICD-10-CM

## 2024-02-23 DIAGNOSIS — I42.8 NONISCHEMIC CARDIOMYOPATHY: Primary | ICD-10-CM

## 2024-02-23 DIAGNOSIS — I49.9 VENTRICULAR ARRHYTHMIA: ICD-10-CM

## 2024-02-23 LAB
ALBUMIN SERPL-MCNC: 4.5 G/DL (ref 3.5–5.2)
ALBUMIN/GLOB SERPL: 1.6 G/DL
ALP SERPL-CCNC: 91 U/L (ref 39–117)
ALT SERPL W P-5'-P-CCNC: 22 U/L (ref 1–41)
ANION GAP SERPL CALCULATED.3IONS-SCNC: 9 MMOL/L (ref 5–15)
AST SERPL-CCNC: 26 U/L (ref 1–40)
BASOPHILS # BLD AUTO: 0.03 10*3/MM3 (ref 0–0.2)
BASOPHILS NFR BLD AUTO: 0.3 % (ref 0–1.5)
BILIRUB SERPL-MCNC: 0.6 MG/DL (ref 0–1.2)
BUN SERPL-MCNC: 8 MG/DL (ref 6–20)
BUN/CREAT SERPL: 9.8 (ref 7–25)
CALCIUM SPEC-SCNC: 9.1 MG/DL (ref 8.6–10.5)
CHLORIDE SERPL-SCNC: 103 MMOL/L (ref 98–107)
CO2 SERPL-SCNC: 28 MMOL/L (ref 22–29)
CREAT BLDA-MCNC: 0.9 MG/DL (ref 0.6–1.3)
CREAT SERPL-MCNC: 0.82 MG/DL (ref 0.76–1.27)
DEPRECATED RDW RBC AUTO: 41.8 FL (ref 37–54)
EGFRCR SERPLBLD CKD-EPI 2021: 106.4 ML/MIN/1.73
EOSINOPHIL # BLD AUTO: 0.25 10*3/MM3 (ref 0–0.4)
EOSINOPHIL NFR BLD AUTO: 2.9 % (ref 0.3–6.2)
ERYTHROCYTE [DISTWIDTH] IN BLOOD BY AUTOMATED COUNT: 12.9 % (ref 12.3–15.4)
GLOBULIN UR ELPH-MCNC: 2.9 GM/DL
GLUCOSE SERPL-MCNC: 121 MG/DL (ref 65–99)
HCT VFR BLD AUTO: 51.7 % (ref 37.5–51)
HGB BLD-MCNC: 16.7 G/DL (ref 13–17.7)
HOLD SPECIMEN: NORMAL
IMM GRANULOCYTES # BLD AUTO: 0.04 10*3/MM3 (ref 0–0.05)
IMM GRANULOCYTES NFR BLD AUTO: 0.5 % (ref 0–0.5)
LYMPHOCYTES # BLD AUTO: 1.84 10*3/MM3 (ref 0.7–3.1)
LYMPHOCYTES NFR BLD AUTO: 21 % (ref 19.6–45.3)
MAGNESIUM SERPL-MCNC: 2 MG/DL (ref 1.6–2.6)
MCH RBC QN AUTO: 28.8 PG (ref 26.6–33)
MCHC RBC AUTO-ENTMCNC: 32.3 G/DL (ref 31.5–35.7)
MCV RBC AUTO: 89.3 FL (ref 79–97)
MONOCYTES # BLD AUTO: 0.74 10*3/MM3 (ref 0.1–0.9)
MONOCYTES NFR BLD AUTO: 8.4 % (ref 5–12)
NEUTROPHILS NFR BLD AUTO: 5.86 10*3/MM3 (ref 1.7–7)
NEUTROPHILS NFR BLD AUTO: 66.9 % (ref 42.7–76)
NRBC BLD AUTO-RTO: 0 /100 WBC (ref 0–0.2)
NT-PROBNP SERPL-MCNC: 43.5 PG/ML (ref 0–900)
PLATELET # BLD AUTO: 209 10*3/MM3 (ref 140–450)
PMV BLD AUTO: 10.8 FL (ref 6–12)
POTASSIUM SERPL-SCNC: 3.5 MMOL/L (ref 3.5–5.2)
PROT SERPL-MCNC: 7.4 G/DL (ref 6–8.5)
RBC # BLD AUTO: 5.79 10*6/MM3 (ref 4.14–5.8)
SODIUM SERPL-SCNC: 140 MMOL/L (ref 136–145)
TROPONIN T SERPL HS-MCNC: 11 NG/L
TSH SERPL DL<=0.05 MIU/L-ACNC: 0.47 UIU/ML (ref 0.27–4.2)
WBC NRBC COR # BLD AUTO: 8.76 10*3/MM3 (ref 3.4–10.8)
WHOLE BLOOD HOLD COAG: NORMAL
WHOLE BLOOD HOLD SPECIMEN: NORMAL

## 2024-02-23 PROCEDURE — 82565 ASSAY OF CREATININE: CPT

## 2024-02-23 PROCEDURE — 93005 ELECTROCARDIOGRAM TRACING: CPT | Performed by: EMERGENCY MEDICINE

## 2024-02-23 PROCEDURE — 83880 ASSAY OF NATRIURETIC PEPTIDE: CPT | Performed by: EMERGENCY MEDICINE

## 2024-02-23 PROCEDURE — 71275 CT ANGIOGRAPHY CHEST: CPT

## 2024-02-23 PROCEDURE — 85025 COMPLETE CBC W/AUTO DIFF WBC: CPT | Performed by: EMERGENCY MEDICINE

## 2024-02-23 PROCEDURE — 80053 COMPREHEN METABOLIC PANEL: CPT | Performed by: EMERGENCY MEDICINE

## 2024-02-23 PROCEDURE — 93005 ELECTROCARDIOGRAM TRACING: CPT

## 2024-02-23 PROCEDURE — 99284 EMERGENCY DEPT VISIT MOD MDM: CPT

## 2024-02-23 PROCEDURE — 84443 ASSAY THYROID STIM HORMONE: CPT | Performed by: EMERGENCY MEDICINE

## 2024-02-23 PROCEDURE — 84484 ASSAY OF TROPONIN QUANT: CPT | Performed by: EMERGENCY MEDICINE

## 2024-02-23 PROCEDURE — 25510000001 IOPAMIDOL PER 1 ML: Performed by: STUDENT IN AN ORGANIZED HEALTH CARE EDUCATION/TRAINING PROGRAM

## 2024-02-23 PROCEDURE — 83735 ASSAY OF MAGNESIUM: CPT | Performed by: EMERGENCY MEDICINE

## 2024-02-23 PROCEDURE — 71045 X-RAY EXAM CHEST 1 VIEW: CPT

## 2024-02-23 RX ORDER — SODIUM CHLORIDE 0.9 % (FLUSH) 0.9 %
10 SYRINGE (ML) INJECTION AS NEEDED
Status: DISCONTINUED | OUTPATIENT
Start: 2024-02-23 | End: 2024-02-23 | Stop reason: HOSPADM

## 2024-02-23 RX ADMIN — IOPAMIDOL 75 ML: 755 INJECTION, SOLUTION INTRAVENOUS at 14:12

## 2024-02-23 NOTE — ED PROVIDER NOTES
Subjective   History of Present Illness  Mr. Ravi presents after his AICD discharged.  He was at the outpatient diagnostic center.  He had just had a cardiac CT with contrast.  On his way out of town he stopped at [employer].  He was sitting down when suddenly he felt himself passing out and then his AICD discharged.  He tells me it has done that 3 times since January 8.  He has history of nonischemic cardiomyopathy.  He sees Robert Parra.      Review of Systems    Past Medical History:   Diagnosis Date    Asthma     CHF NYHA class I     Coronary artery disease 2000    GERD (gastroesophageal reflux disease)     Heart disease     History of vertigo     Hyperlipidemia     Nonischemic cardiomyopathy     Obesity     IRINA on CPAP     Ventricular arrhythmia 1/30/2024       Allergies   Allergen Reactions    Amoxicillin Anaphylaxis     difficulting breathing       Past Surgical History:   Procedure Laterality Date    CARDIAC CATHETERIZATION      CARDIAC CATHETERIZATION Left 10/19/2021    Procedure: Left Heart Cath;  Surgeon: Shankar Sanchez MD;  Location: [facility] CATH INVASIVE LOCATION;  Service: Cardiovascular;  Laterality: Left;    CARDIAC DEFIBRILLATOR PLACEMENT  2015    COLONOSCOPY         Family History   Problem Relation Age of Onset    Colon cancer Mother     Heart disease Father     Heart attack Father     No Known Problems Sister     Alcohol abuse Paternal Uncle     Alcohol abuse Maternal Grandfather     Alzheimer's disease Paternal Grandmother        Social History     Socioeconomic History    Marital status:    Tobacco Use    Smoking status: Never     Passive exposure: Past    Smokeless tobacco: Never   Vaping Use    Vaping Use: Never used    Passive vaping exposure: Yes   Substance and Sexual Activity    Alcohol use: No    Drug use: No    Sexual activity: Yes     Partners: Female           Objective   Physical Exam  Vitals and nursing note reviewed.   Constitutional:       General: He is not in acute  distress.     Appearance: Normal appearance.   HENT:      Head: Normocephalic and atraumatic.      Nose: Nose normal. No congestion or rhinorrhea.   Eyes:      General: No scleral icterus.     Conjunctiva/sclera: Conjunctivae normal.   Neck:      Comments: No JVD   Cardiovascular:      Rate and Rhythm: Normal rate and regular rhythm.      Heart sounds: No murmur heard.     No friction rub.   Pulmonary:      Effort: Pulmonary effort is normal.      Breath sounds: Normal breath sounds. No wheezing or rales.   Musculoskeletal:      Cervical back: Normal range of motion and neck supple.   Skin:     General: Skin is warm and dry.      Coloration: Skin is not pale.      Findings: No erythema.   Neurological:      General: No focal deficit present.      Mental Status: He is alert and oriented to person, place, and time.      Motor: No weakness.      Coordination: Coordination normal.   Psychiatric:         Mood and Affect: Mood normal.         Behavior: Behavior normal.         Thought Content: Thought content normal.         Procedures           ED Course  ED Course as of 02/24/24 0114   Fri Feb 23, 2024   [id number] Paged Dr. Parra [DT]   8671 Discussed with Dr. Parra who reviewed his records.  He advises that he is trying to get Mr. Bui scheduled for ablation.  He agrees with current plan to obtain labs.  Will reassess once we have labs and have him in a room.  He advised that was comfortable discharging but also offered admission to try to add him on the schedule for an ablation. [DT]   1938 I spoke with Mr. Bui and his wife about findings.  I gave them the option of being admitted to attempt to be added to the schedule for expedited ablation versus discharge.  And he preferred discharge [DT]      ED Course User Index  [DT] Gurwinder Martinez MD                                             Medical Decision Making  Please see course notes.  I reviewed and interpreted prior records.  I had consultation with cardiology.   Ordered and interpreted multiple labs and had reevaluation.    Problems Addressed:  AICD discharge: complicated acute illness or injury that poses a threat to life or bodily functions  Nonischemic cardiomyopathy: chronic illness or injury with exacerbation, progression, or side effects of treatment    Amount and/or Complexity of Data Reviewed  External Data Reviewed: notes.  Labs: ordered. Decision-making details documented in ED Course.  Radiology: ordered. Decision-making details documented in ED Course.  ECG/medicine tests: ordered. Decision-making details documented in ED Course.    Risk  Prescription drug management.        Final diagnoses:   AICD discharge   Nonischemic cardiomyopathy       ED Disposition  ED Disposition       ED Disposition   Discharge    Condition   Stable    Comment   --               Jayant Newton MD  [address] [facility] [address]  [phone]          Robert Parra MD  [facility] [place] [id number] [phone]               Medication List      No changes were made to your prescriptions during this visit.            Gurwinder Martinez MD  02/24/24 0114

## 2024-02-23 NOTE — TELEPHONE ENCOUNTER
Caller: David Ravi    Relationship: Self    Best call back number: [phone]    What is the best time to reach you: ANY    Who are you requesting to speak with (clinical staff, provider,  specific staff member): CLINICAL    What was the call regarding: PATIENT CALLED TO ADVISE THAT EARLIER THIS DATE ( 2-23-24) HIS DEFIBRILLATOR WENT OFF. HE ADVISES THIS IS THE THIRD TIME THIS HAS HAPPENED SINCE 01-08-24. PLEASE CONTACT PATIENT IN REGARDS TO THIS ISSUE.    Is it okay if the provider responds through [facility]: PLEASE CALL

## 2024-02-25 LAB
QT INTERVAL: 350 MS
QTC INTERVAL: 413 MS

## 2024-03-04 ENCOUNTER — PREP FOR SURGERY (OUTPATIENT)
Dept: OTHER | Facility: HOSPITAL | Age: 54
End: 2024-03-04
Payer: MEDICARE

## 2024-03-04 RX ORDER — ONDANSETRON 2 MG/ML
4 INJECTION INTRAMUSCULAR; INTRAVENOUS EVERY 6 HOURS PRN
OUTPATIENT
Start: 2024-03-04

## 2024-03-04 RX ORDER — NITROGLYCERIN 0.4 MG/1
0.4 TABLET SUBLINGUAL
OUTPATIENT
Start: 2024-03-04

## 2024-03-04 RX ORDER — ACETAMINOPHEN 325 MG/1
650 TABLET ORAL EVERY 4 HOURS PRN
OUTPATIENT
Start: 2024-03-04

## 2024-03-04 RX ORDER — SODIUM CHLORIDE 9 MG/ML
40 INJECTION, SOLUTION INTRAVENOUS AS NEEDED
OUTPATIENT
Start: 2024-03-04

## 2024-03-15 ENCOUNTER — TELEPHONE (OUTPATIENT)
Dept: CARDIOLOGY | Facility: CLINIC | Age: 54
End: 2024-03-15

## 2024-03-15 NOTE — TELEPHONE ENCOUNTER
After the patient's name and date of birth was verified, the patient was told the below information.She said to send the Rx to Tina Stacy LPN..................10/1/2020   2:18 PM       Yes for 3/27/2024

## 2024-03-20 ENCOUNTER — PREP FOR SURGERY (OUTPATIENT)
Dept: OTHER | Facility: HOSPITAL | Age: 54
End: 2024-03-20
Payer: MEDICARE

## 2024-03-25 ENCOUNTER — TELEPHONE (OUTPATIENT)
Dept: CARDIOLOGY | Facility: CLINIC | Age: 54
End: 2024-03-25
Payer: MEDICARE

## 2024-03-25 NOTE — TELEPHONE ENCOUNTER
I have the report of the cardiac MRI. It was done at  on 3/6/24.    I faxed a request to [employer]'s film library. They are going to power share us the images so we have get them put on a disk.

## 2024-03-25 NOTE — TELEPHONE ENCOUNTER
----- Message from Robert Parra MD sent at 3/22/2024  1:29 PM EDT -----  Can we see if this joyce got his MRI from .  I need to get a disc if he did so that I can process it before Wednesday.

## 2024-03-27 ENCOUNTER — ANESTHESIA (OUTPATIENT)
Dept: CARDIOLOGY | Facility: HOSPITAL | Age: 54
End: 2024-03-27
Payer: MEDICARE

## 2024-03-27 ENCOUNTER — ANESTHESIA EVENT (OUTPATIENT)
Dept: CARDIOLOGY | Facility: HOSPITAL | Age: 54
End: 2024-03-27
Payer: MEDICARE

## 2024-03-27 ENCOUNTER — HOSPITAL ENCOUNTER (OUTPATIENT)
Facility: HOSPITAL | Age: 54
Setting detail: HOSPITAL OUTPATIENT SURGERY
Discharge: HOME | End: 2024-03-27
Attending: STUDENT IN AN ORGANIZED HEALTH CARE EDUCATION/TRAINING PROGRAM | Admitting: STUDENT IN AN ORGANIZED HEALTH CARE EDUCATION/TRAINING PROGRAM
Payer: MEDICARE

## 2024-03-27 VITALS
DIASTOLIC BLOOD PRESSURE: 76 MMHG | SYSTOLIC BLOOD PRESSURE: 115 MMHG | HEART RATE: 96 BPM | HEIGHT: 71 IN | OXYGEN SATURATION: 93 % | TEMPERATURE: 97.4 F | WEIGHT: 246.69 LBS | RESPIRATION RATE: 12 BRPM | BODY MASS INDEX: 34.54 KG/M2

## 2024-03-27 DIAGNOSIS — I49.9 VENTRICULAR ARRHYTHMIA: ICD-10-CM

## 2024-03-27 DIAGNOSIS — I49.01 VENTRICULAR FIBRILLATION: ICD-10-CM

## 2024-03-27 DIAGNOSIS — I42.8 NONISCHEMIC CARDIOMYOPATHY: ICD-10-CM

## 2024-03-27 LAB
ACT BLD: 325 SECONDS (ref 82–152)
ACT BLD: 352 SECONDS (ref 82–152)
ACT BLD: 363 SECONDS (ref 82–152)
ACT BLD: 385 SECONDS (ref 82–152)
ANION GAP SERPL CALCULATED.3IONS-SCNC: 12 MMOL/L (ref 5–15)
BUN SERPL-MCNC: 8 MG/DL (ref 6–20)
BUN/CREAT SERPL: 11.1 (ref 7–25)
CALCIUM SPEC-SCNC: 8.9 MG/DL (ref 8.6–10.5)
CHLORIDE SERPL-SCNC: 102 MMOL/L (ref 98–107)
CO2 SERPL-SCNC: 26 MMOL/L (ref 22–29)
CREAT SERPL-MCNC: 0.72 MG/DL (ref 0.76–1.27)
DEPRECATED RDW RBC AUTO: 42.4 FL (ref 37–54)
EGFRCR SERPLBLD CKD-EPI 2021: 109.9 ML/MIN/1.73
ERYTHROCYTE [DISTWIDTH] IN BLOOD BY AUTOMATED COUNT: 12.9 % (ref 12.3–15.4)
GLUCOSE SERPL-MCNC: 94 MG/DL (ref 65–99)
HCT VFR BLD AUTO: 52.1 % (ref 37.5–51)
HGB BLD-MCNC: 17 G/DL (ref 13–17.7)
MCH RBC QN AUTO: 29.2 PG (ref 26.6–33)
MCHC RBC AUTO-ENTMCNC: 32.6 G/DL (ref 31.5–35.7)
MCV RBC AUTO: 89.5 FL (ref 79–97)
PLATELET # BLD AUTO: 209 10*3/MM3 (ref 140–450)
PMV BLD AUTO: 10.6 FL (ref 6–12)
POTASSIUM SERPL-SCNC: 3.8 MMOL/L (ref 3.5–5.2)
RBC # BLD AUTO: 5.82 10*6/MM3 (ref 4.14–5.8)
SODIUM SERPL-SCNC: 140 MMOL/L (ref 136–145)
WBC NRBC COR # BLD AUTO: 9.19 10*3/MM3 (ref 3.4–10.8)

## 2024-03-27 PROCEDURE — 25010000002 PROTAMINE SULFATE PER 10 MG: Performed by: STUDENT IN AN ORGANIZED HEALTH CARE EDUCATION/TRAINING PROGRAM

## 2024-03-27 PROCEDURE — C1769 GUIDE WIRE: HCPCS | Performed by: STUDENT IN AN ORGANIZED HEALTH CARE EDUCATION/TRAINING PROGRAM

## 2024-03-27 PROCEDURE — 93662 INTRACARDIAC ECG (ICE): CPT | Performed by: STUDENT IN AN ORGANIZED HEALTH CARE EDUCATION/TRAINING PROGRAM

## 2024-03-27 PROCEDURE — C1894 INTRO/SHEATH, NON-LASER: HCPCS | Performed by: STUDENT IN AN ORGANIZED HEALTH CARE EDUCATION/TRAINING PROGRAM

## 2024-03-27 PROCEDURE — 25810000003 SODIUM CHLORIDE 0.9 % SOLUTION: Performed by: NURSE ANESTHETIST, CERTIFIED REGISTERED

## 2024-03-27 PROCEDURE — 85027 COMPLETE CBC AUTOMATED: CPT

## 2024-03-27 PROCEDURE — C1730 CATH, EP, 19 OR FEW ELECT: HCPCS | Performed by: STUDENT IN AN ORGANIZED HEALTH CARE EDUCATION/TRAINING PROGRAM

## 2024-03-27 PROCEDURE — 93654 COMPRE EP EVAL TX VT: CPT | Performed by: STUDENT IN AN ORGANIZED HEALTH CARE EDUCATION/TRAINING PROGRAM

## 2024-03-27 PROCEDURE — 25010000002 FENTANYL CITRATE (PF) 50 MCG/ML SOLUTION: Performed by: NURSE ANESTHETIST, CERTIFIED REGISTERED

## 2024-03-27 PROCEDURE — 85347 COAGULATION TIME ACTIVATED: CPT

## 2024-03-27 PROCEDURE — 25810000003 SODIUM CHLORIDE 0.9 % SOLUTION 250 ML FLEX CONT: Performed by: NURSE ANESTHETIST, CERTIFIED REGISTERED

## 2024-03-27 PROCEDURE — 93462 L HRT CATH TRNSPTL PUNCTURE: CPT | Performed by: STUDENT IN AN ORGANIZED HEALTH CARE EDUCATION/TRAINING PROGRAM

## 2024-03-27 PROCEDURE — 25010000002 GLYCOPYRROLATE 0.4 MG/2ML SOLUTION: Performed by: NURSE ANESTHETIST, CERTIFIED REGISTERED

## 2024-03-27 PROCEDURE — 80048 BASIC METABOLIC PNL TOTAL CA: CPT

## 2024-03-27 PROCEDURE — C1766 INTRO/SHEATH,STRBLE,NON-PEEL: HCPCS | Performed by: STUDENT IN AN ORGANIZED HEALTH CARE EDUCATION/TRAINING PROGRAM

## 2024-03-27 PROCEDURE — 25010000002 NEOSTIGMINE 10 MG/10ML SOLUTION: Performed by: NURSE ANESTHETIST, CERTIFIED REGISTERED

## 2024-03-27 PROCEDURE — C1760 CLOSURE DEV, VASC: HCPCS | Performed by: STUDENT IN AN ORGANIZED HEALTH CARE EDUCATION/TRAINING PROGRAM

## 2024-03-27 PROCEDURE — 25810000003 SODIUM CHLORIDE 0.9 % SOLUTION: Performed by: STUDENT IN AN ORGANIZED HEALTH CARE EDUCATION/TRAINING PROGRAM

## 2024-03-27 PROCEDURE — C1732 CATH, EP, DIAG/ABL, 3D/VECT: HCPCS | Performed by: STUDENT IN AN ORGANIZED HEALTH CARE EDUCATION/TRAINING PROGRAM

## 2024-03-27 PROCEDURE — 25010000002 DEXAMETHASONE PER 1 MG: Performed by: NURSE ANESTHETIST, CERTIFIED REGISTERED

## 2024-03-27 PROCEDURE — 25010000002 PHENYLEPHRINE 10 MG/ML SOLUTION 1 ML VIAL: Performed by: NURSE ANESTHETIST, CERTIFIED REGISTERED

## 2024-03-27 PROCEDURE — C1759 CATH, INTRA ECHOCARDIOGRAPHY: HCPCS | Performed by: STUDENT IN AN ORGANIZED HEALTH CARE EDUCATION/TRAINING PROGRAM

## 2024-03-27 PROCEDURE — C2630 CATH, EP, COOL-TIP: HCPCS | Performed by: STUDENT IN AN ORGANIZED HEALTH CARE EDUCATION/TRAINING PROGRAM

## 2024-03-27 PROCEDURE — C1893 INTRO/SHEATH, FIXED,NON-PEEL: HCPCS | Performed by: STUDENT IN AN ORGANIZED HEALTH CARE EDUCATION/TRAINING PROGRAM

## 2024-03-27 PROCEDURE — 25010000002 HEPARIN (PORCINE) PER 1000 UNITS: Performed by: STUDENT IN AN ORGANIZED HEALTH CARE EDUCATION/TRAINING PROGRAM

## 2024-03-27 PROCEDURE — 25010000002 PROPOFOL 10 MG/ML EMULSION: Performed by: NURSE ANESTHETIST, CERTIFIED REGISTERED

## 2024-03-27 RX ORDER — NEOSTIGMINE METHYLSULFATE 1 MG/ML
INJECTION, SOLUTION INTRAVENOUS AS NEEDED
Status: DISCONTINUED | OUTPATIENT
Start: 2024-03-27 | End: 2024-03-27 | Stop reason: SURG

## 2024-03-27 RX ORDER — SODIUM CHLORIDE 9 MG/ML
40 INJECTION, SOLUTION INTRAVENOUS AS NEEDED
Status: DISCONTINUED | OUTPATIENT
Start: 2024-03-27 | End: 2024-03-27 | Stop reason: HOSPADM

## 2024-03-27 RX ORDER — NITROGLYCERIN 0.4 MG/1
0.4 TABLET SUBLINGUAL
Status: DISCONTINUED | OUTPATIENT
Start: 2024-03-27 | End: 2024-03-27 | Stop reason: HOSPADM

## 2024-03-27 RX ORDER — HEPARIN SODIUM 10000 [USP'U]/100ML
INJECTION, SOLUTION INTRAVENOUS
Status: DISCONTINUED | OUTPATIENT
Start: 2024-03-27 | End: 2024-03-27 | Stop reason: HOSPADM

## 2024-03-27 RX ORDER — SODIUM CHLORIDE 0.9 % (FLUSH) 0.9 %
10 SYRINGE (ML) INJECTION AS NEEDED
Status: DISCONTINUED | OUTPATIENT
Start: 2024-03-27 | End: 2024-03-27 | Stop reason: HOSPADM

## 2024-03-27 RX ORDER — SODIUM CHLORIDE 9 MG/ML
INJECTION, SOLUTION INTRAVENOUS
Status: COMPLETED | OUTPATIENT
Start: 2024-03-27 | End: 2024-03-27

## 2024-03-27 RX ORDER — IBUPROFEN 200 MG
400 TABLET ORAL EVERY 6 HOURS PRN
Status: DISCONTINUED | OUTPATIENT
Start: 2024-03-27 | End: 2024-03-27 | Stop reason: HOSPADM

## 2024-03-27 RX ORDER — ACETAMINOPHEN 325 MG/1
650 TABLET ORAL EVERY 4 HOURS PRN
Status: DISCONTINUED | OUTPATIENT
Start: 2024-03-27 | End: 2024-03-27 | Stop reason: HOSPADM

## 2024-03-27 RX ORDER — ACETAMINOPHEN 650 MG/1
650 SUPPOSITORY RECTAL EVERY 4 HOURS PRN
Status: DISCONTINUED | OUTPATIENT
Start: 2024-03-27 | End: 2024-03-27 | Stop reason: HOSPADM

## 2024-03-27 RX ORDER — ROCURONIUM BROMIDE 10 MG/ML
INJECTION, SOLUTION INTRAVENOUS AS NEEDED
Status: DISCONTINUED | OUTPATIENT
Start: 2024-03-27 | End: 2024-03-27 | Stop reason: SURG

## 2024-03-27 RX ORDER — DAPAGLIFLOZIN 10 MG/1
5 TABLET, FILM COATED ORAL DAILY
COMMUNITY

## 2024-03-27 RX ORDER — PROTAMINE SULFATE 10 MG/ML
INJECTION, SOLUTION INTRAVENOUS
Status: DISCONTINUED | OUTPATIENT
Start: 2024-03-27 | End: 2024-03-27 | Stop reason: HOSPADM

## 2024-03-27 RX ORDER — CARVEDILOL 6.25 MG/1
6.25 TABLET ORAL 2 TIMES DAILY WITH MEALS
COMMUNITY
End: 2024-04-08 | Stop reason: HOSPADM

## 2024-03-27 RX ORDER — LIDOCAINE HYDROCHLORIDE 10 MG/ML
INJECTION, SOLUTION EPIDURAL; INFILTRATION; INTRACAUDAL; PERINEURAL AS NEEDED
Status: DISCONTINUED | OUTPATIENT
Start: 2024-03-27 | End: 2024-03-27 | Stop reason: SURG

## 2024-03-27 RX ORDER — BUPIVACAINE HCL/0.9 % NACL/PF 0.125 %
PLASTIC BAG, INJECTION (ML) EPIDURAL AS NEEDED
Status: DISCONTINUED | OUTPATIENT
Start: 2024-03-27 | End: 2024-03-27 | Stop reason: SURG

## 2024-03-27 RX ORDER — SODIUM CHLORIDE 9 MG/ML
INJECTION, SOLUTION INTRAVENOUS CONTINUOUS PRN
Status: DISCONTINUED | OUTPATIENT
Start: 2024-03-27 | End: 2024-03-27 | Stop reason: SURG

## 2024-03-27 RX ORDER — HEPARIN SODIUM 1000 [USP'U]/ML
INJECTION, SOLUTION INTRAVENOUS; SUBCUTANEOUS
Status: DISCONTINUED | OUTPATIENT
Start: 2024-03-27 | End: 2024-03-27 | Stop reason: HOSPADM

## 2024-03-27 RX ORDER — SODIUM CHLORIDE 0.9 % (FLUSH) 0.9 %
3 SYRINGE (ML) INJECTION EVERY 12 HOURS SCHEDULED
Status: DISCONTINUED | OUTPATIENT
Start: 2024-03-27 | End: 2024-03-27 | Stop reason: HOSPADM

## 2024-03-27 RX ORDER — GLYCOPYRROLATE 0.2 MG/ML
INJECTION INTRAMUSCULAR; INTRAVENOUS AS NEEDED
Status: DISCONTINUED | OUTPATIENT
Start: 2024-03-27 | End: 2024-03-27 | Stop reason: SURG

## 2024-03-27 RX ORDER — FENTANYL CITRATE 50 UG/ML
INJECTION, SOLUTION INTRAMUSCULAR; INTRAVENOUS AS NEEDED
Status: DISCONTINUED | OUTPATIENT
Start: 2024-03-27 | End: 2024-03-27 | Stop reason: SURG

## 2024-03-27 RX ORDER — ONDANSETRON 2 MG/ML
4 INJECTION INTRAMUSCULAR; INTRAVENOUS EVERY 6 HOURS PRN
Status: DISCONTINUED | OUTPATIENT
Start: 2024-03-27 | End: 2024-03-27 | Stop reason: HOSPADM

## 2024-03-27 RX ORDER — PROPOFOL 10 MG/ML
VIAL (ML) INTRAVENOUS AS NEEDED
Status: DISCONTINUED | OUTPATIENT
Start: 2024-03-27 | End: 2024-03-27 | Stop reason: SURG

## 2024-03-27 RX ORDER — DEXAMETHASONE SODIUM PHOSPHATE 4 MG/ML
INJECTION, SOLUTION INTRA-ARTICULAR; INTRALESIONAL; INTRAMUSCULAR; INTRAVENOUS; SOFT TISSUE AS NEEDED
Status: DISCONTINUED | OUTPATIENT
Start: 2024-03-27 | End: 2024-03-27 | Stop reason: SURG

## 2024-03-27 RX ADMIN — NEOSTIGMINE METHYLSULFATE 3 MG: 0.5 INJECTION INTRAVENOUS at 12:32

## 2024-03-27 RX ADMIN — ROCURONIUM BROMIDE 20 MG: 10 SOLUTION INTRAVENOUS at 10:30

## 2024-03-27 RX ADMIN — SODIUM CHLORIDE: 9 INJECTION, SOLUTION INTRAVENOUS at 08:15

## 2024-03-27 RX ADMIN — GLYCOPYRROLATE 0.5 MG: 0.2 INJECTION INTRAMUSCULAR; INTRAVENOUS at 12:31

## 2024-03-27 RX ADMIN — Medication 100 MCG: at 08:33

## 2024-03-27 RX ADMIN — DEXAMETHASONE SODIUM PHOSPHATE 8 MG: 4 INJECTION, SOLUTION INTRAMUSCULAR; INTRAVENOUS at 08:50

## 2024-03-27 RX ADMIN — FENTANYL CITRATE 50 MCG: 50 INJECTION, SOLUTION INTRAMUSCULAR; INTRAVENOUS at 08:33

## 2024-03-27 RX ADMIN — PROPOFOL 150 MG: 10 INJECTION, EMULSION INTRAVENOUS at 08:33

## 2024-03-27 RX ADMIN — ROCURONIUM BROMIDE 80 MG: 10 SOLUTION INTRAVENOUS at 08:33

## 2024-03-27 RX ADMIN — PHENYLEPHRINE HYDROCHLORIDE 0.3 MCG/KG/MIN: 10 INJECTION INTRAVENOUS at 08:37

## 2024-03-27 RX ADMIN — LIDOCAINE HYDROCHLORIDE 100 MG: 10 INJECTION, SOLUTION EPIDURAL; INFILTRATION; INTRACAUDAL; PERINEURAL at 08:33

## 2024-03-27 NOTE — ANESTHESIA POSTPROCEDURE EVALUATION
Patient: David Ravi    Procedure Summary       Date: 03/27/24 Room / Location: [facility] / [facility]VASIVE [facility]    Anesthesia Start: 0811 Anesthesia Stop: 1244    Procedure: Ablation VT--DNS meds, schedule after cardiac MRI Diagnosis:       Nonischemic cardiomyopathy      Ventricular fibrillation      Ventricular arrhythmia      (v tach)    Providers: Robert Parra MD Provider: Zhou Mckinley MD    Anesthesia Type: general ASA Status: 4            Anesthesia Type: general    Vitals  No vitals data found for the desired time range.    /62  T 97F  HR 68 SR  SPO2 99% RA  RR 14      Post Anesthesia Care and Evaluation    Patient location during evaluation: bedside (EP PACU RECOVERY)  Patient participation: complete - patient participated  Level of consciousness: awake and alert  Pain management: adequate    Airway patency: patent  Anesthetic complications: No anesthetic complications  PONV Status: none  Cardiovascular status: hemodynamically stable and acceptable  Respiratory status: nonlabored ventilation, acceptable and nasal cannula  Hydration status: acceptable

## 2024-03-27 NOTE — ANESTHESIA PREPROCEDURE EVALUATION
Anesthesia Evaluation     Patient summary reviewed and Nursing notes reviewed   NPO Solid Status: > 8 hours  NPO Liquid Status: > 2 hours           Airway   Mallampati: II  TM distance: >3 FB  Neck ROM: full  No difficulty expected  Dental      Pulmonary    (+) asthma (well controlled PRN MDI no need recently),shortness of breath (stable), sleep apnea on CPAP  (-) recent URI, not a smoker  Cardiovascular     ECG reviewed    (+) pacemaker (ICD) ICD, past MI , dysrhythmias (VFIB), CHF Systolic <55%, hyperlipidemia  (-) CAD, angina, cardiac stents    ROS comment: ECG  SR occasional PVC's  NS TW abnormality    ECHO 2023  EF > 42% LV moderately dilated. LV global HK  Valves WNL   .  CATH 2021 Normal coronary arteries NICM  EF>30% -severe global HK LVEDP 14           Neuro/Psych  (-) seizures, CVA  GI/Hepatic/Renal/Endo    (+) obesity, GERD  (-) morbid obesity, no renal disease, diabetes, no thyroid disorder    Musculoskeletal     Abdominal    Substance History      OB/GYN          Other            Phys Exam Other: Beard               Anesthesia Plan    ASA 4     general     (Clearsight VS Mini (diminshed  EF 42) )  intravenous induction     Anesthetic plan, risks, benefits, and alternatives have been provided, discussed and informed consent has been obtained with: patient.    Plan discussed with CRNA.      CODE STATUS:

## 2024-03-27 NOTE — Clinical Note
Hemostasis started on the left femoral vein. Vascade MVP was used in achieving hemostasis. Closure device deployed in the vessel. Hemostasis achieved successfully. Closure device additional comment: Vascade x 2

## 2024-03-27 NOTE — ANESTHESIA PROCEDURE NOTES
Airway  Urgency: elective    Date/Time: 3/27/2024 8:37 AM  Airway not difficult    General Information and Staff    Patient location during procedure: OR  CRNA/CAA: Joel Corcoran CRNA    Indications and Patient Condition  Indications for airway management: airway protection    Preoxygenated: yes  MILS not maintained throughout  Mask difficulty assessment: 1 - vent by mask    Final Airway Details  Final airway type: endotracheal airway      Successful airway: ETT  Cuffed: yes   Successful intubation technique: direct laryngoscopy  Endotracheal tube insertion site: oral  Blade: Abel  Blade size: 4  ETT size (mm): 8.0  Cormack-Lehane Classification: grade IIa - partial view of glottis  Placement verified by: chest auscultation and capnometry   Measured from: lips  ETT/EBT  to lips (cm): 22  Number of attempts at approach: 1  Assessment: lips, teeth, and gum same as pre-op and atraumatic intubation    Additional Comments  Negative epigastric sounds, Breath sound equal bilaterally with symmetric chest rise and fall

## 2024-03-27 NOTE — H&P
Pre-cardiac Ablation History and Physical  [facility] at [facility]      Patient:  David Rvai  :  1970  MRN: [id number]    PCP:  Jayant Newton MD  PHONE:  [phone]    DATE: 3/27/2024  ID: David Ravi is a 54 y.o. male from [facility]    CC: VT    BRIEF HPI:   Mr. Ravi is a 55 y/o male with nonischemic cardiomyopathy status post single-chamber ICD and VT. In 2023, he had experienced no ICD shocks.  In October he had his first ICD shock while walking to the bathroom.  He felt heart pounding and then felt as if he was about to pass out before receiving shock therapy.  He had 2 similar episodes in January and was seen in our clinic. At that time he elected to proceed with catheter ablation. Since then, he reports he has had 2 more shocks, 1 in January and 1 in February.  These were preceded by a brief period of palpitations, lightheadedness and chest discomfort.  He never started the amiodarone.  He underwent a cardiac MRI earlier this month that showed no LGE but imaging was somewhat limited by his ICD.      Allergies:      Allergies   Allergen Reactions    Amoxicillin Anaphylaxis     difficulting breathing       MEDICATIONS:  Current Outpatient Medications   Medication Instructions    Acetaminophen (TYLENOL ARTHRITIS PAIN PO) 2 tablets, Oral, As Needed    albuterol sulfate  (90 Base) MCG/ACT inhaler 2 puffs, Inhalation, Every 4 Hours PRN    ALLERGY SERUM INJECTION Subcutaneous, Once,      azelastine (ASTEPRO) 0.15 % solution nasal spray 2 sprays into the nostril(s) as directed by provider Daily As Needed for Allergies.    carvedilol (COREG) 6.25 mg, Oral, 2 Times Daily With Meals    dapagliflozin Propanediol 5 mg, Oral, Daily    ENTRESTO  MG tablet 1 tablet, Oral, 2 Times Daily    levocetirizine (XYZAL) 5 mg, Oral, Every Evening    montelukast (SINGULAIR) 10 mg, Oral, Nightly    multivitamin with minerals tablet tablet 1 tablet,  "Oral, Daily    nitroglycerin (NITROSTAT) 0.4 mg, Sublingual, As Needed    omeprazole (PRILOSEC) 20 mg, Oral, As Needed       Past medical & surgical history, social and family history reviewed in the electronic medical record.    ROS: Pertinent positives listed in the HPI and problem list above. All others reviewed and negative.     Physical Exam:   /72 (BP Location: Left arm, Patient Position: Lying) Comment: 108/75 R ARM  Pulse 74   Temp 97.9 °F (36.6 °C) (Temporal)   Resp 16   Ht 180.3 cm (71\")   Wt 112 kg (246 lb 11.1 oz)   SpO2 94%   BMI 34.41 kg/m²     Constitutional:    Well-appearing 54 y.o. y/o adult  in no acute distress        Heart:    Regular rhythm and normal rate, no murmurs, rubs or gallops   Lungs:     Clear to auscultation bilaterally, no wheezes, rhales or rhonchi, nonlabored respirations       Extremities:   No gross deformities, no edema, clubbing, or cyanosis.    Pulses:    Neuro:  Psych:   Radial  pulses palpable and equal bilaterally.  No gross focal deficits  Mood and behavior appropriate for situation     Labs and Diagnostic Data:  Lab Results   Component Value Date    WBC 9.19 03/27/2024    HGB 17.0 03/27/2024    HCT 52.1 (H) 03/27/2024    MCV 89.5 03/27/2024     03/27/2024     Lab Results   Component Value Date    GLUCOSE 121 (H) 02/23/2024    BUN 8 02/23/2024    CREATININE 0.82 02/23/2024    EGFR 106.4 02/23/2024    BCR 9.8 02/23/2024    K 3.5 02/23/2024    CO2 28.0 02/23/2024    CALCIUM 9.1 02/23/2024    ALBUMIN 4.5 02/23/2024    BILITOT 0.6 02/23/2024    AST 26 02/23/2024    ALT 22 02/23/2024     Lab Results   Component Value Date    TSH 0.473 02/23/2024     Lab Results   Component Value Date    CHOL 172 10/19/2021    CHLPL 143 09/08/2015    TRIG 136 10/19/2021    HDL 54 10/19/2021    LDL 94 10/19/2021     Lab Results   Component Value Date    HGBA1C 5.20 10/19/2021         Tele: NSR    IMPRESSION:  Mr. Bui is a 54-year-old male with nonischemic cardiomyopathy " s/p single-chamber ICD and multiple ICD shocks for sustained VT from October to January who presents for VT ablation with general anesthesia.  No meds to hold    PLAN:  Procedure to perform: VT Ablation. Risks, benefits and alternatives to the procedure explained to the patient and he understands and wishes to proceed.     Electronically signed by Barrett Ames PA-C, 03/27/24, 7:22 AM EDT.

## 2024-03-27 NOTE — Clinical Note
Replaced previous sheath in the right femoral vein. Transseptal sheath exchanged for short 9fr sheath

## 2024-03-27 NOTE — Clinical Note
Hemostasis started on the right femoral vein. Vascade MVP was used in achieving hemostasis. Closure device deployed in the vessel. Hemostasis achieved successfully. Closure device additional comment: Vascade x 2

## 2024-03-28 ENCOUNTER — CALL CENTER PROGRAMS (OUTPATIENT)
Dept: CALL CENTER | Facility: HOSPITAL | Age: 54
End: 2024-03-28
Payer: MEDICARE

## 2024-03-28 NOTE — OUTREACH NOTE
PCI/Device Survey      Flowsheet Row Responses   Facility patient discharged from? [facility]   Procedure date 03/27/24   Procedure (if device, specify in description) Ablation   Performing MD Dr. Robert Parra   Attempt successful? Yes   Call start time 0827   Call end time 0831   Has the patient had any of the following symptoms since discharge? --  [no symptoms noted]   Nursing interventions Patient education provided   Is the patient taking prescribed medications: None   Nursing intervention Reminded to continue to take prescribed medications   Does the patient have any of the following symptoms related to the cath/surgical site? --  [No symptoms noted]   Nursing intervention Patient education provided   Does the patient have an appointment scheduled with the cardiologist? Yes   Appointment comments Hospital f/u with cardiology on 6/25   Did the patient feel prepared to go home on the same day as the procedure? Yes   Is the patient satisfied with the same day discharge process? Yes   PCI/Device call completed Yes   Wrap up additional comments Doing well, states he was unable to sleep last night but thinks it is from the anesthesia he had yesterday, advised him to call MD if symptoms persist.            Hannah COKER - Registered Nurse

## 2024-04-05 ENCOUNTER — APPOINTMENT (OUTPATIENT)
Dept: GENERAL RADIOLOGY | Facility: HOSPITAL | Age: 54
DRG: 309 | End: 2024-04-05
Payer: MEDICARE

## 2024-04-05 ENCOUNTER — TELEPHONE (OUTPATIENT)
Dept: CARDIOLOGY | Facility: CLINIC | Age: 54
End: 2024-04-05

## 2024-04-05 ENCOUNTER — HOSPITAL ENCOUNTER (INPATIENT)
Facility: HOSPITAL | Age: 54
LOS: 3 days | Discharge: HOME | DRG: 309 | End: 2024-04-08
Attending: EMERGENCY MEDICINE | Admitting: STUDENT IN AN ORGANIZED HEALTH CARE EDUCATION/TRAINING PROGRAM
Payer: MEDICARE

## 2024-04-05 DIAGNOSIS — I47.20 VENTRICULAR TACHYCARDIA: Primary | ICD-10-CM

## 2024-04-05 LAB
ALBUMIN SERPL-MCNC: 4.6 G/DL (ref 3.5–5.2)
ALBUMIN/GLOB SERPL: 1.6 G/DL
ALP SERPL-CCNC: 135 U/L (ref 39–117)
ALT SERPL W P-5'-P-CCNC: 42 U/L (ref 1–41)
ANION GAP SERPL CALCULATED.3IONS-SCNC: 10 MMOL/L (ref 5–15)
AST SERPL-CCNC: 35 U/L (ref 1–40)
BASOPHILS # BLD AUTO: 0.04 10*3/MM3 (ref 0–0.2)
BASOPHILS NFR BLD AUTO: 0.4 % (ref 0–1.5)
BILIRUB SERPL-MCNC: 0.6 MG/DL (ref 0–1.2)
BUN SERPL-MCNC: 9 MG/DL (ref 6–20)
BUN/CREAT SERPL: 9.5 (ref 7–25)
CALCIUM SPEC-SCNC: 9.8 MG/DL (ref 8.6–10.5)
CHLORIDE SERPL-SCNC: 102 MMOL/L (ref 98–107)
CO2 SERPL-SCNC: 25 MMOL/L (ref 22–29)
CREAT SERPL-MCNC: 0.95 MG/DL (ref 0.76–1.27)
DEPRECATED RDW RBC AUTO: 42.6 FL (ref 37–54)
EGFRCR SERPLBLD CKD-EPI 2021: 96.3 ML/MIN/1.73
EOSINOPHIL # BLD AUTO: 0.14 10*3/MM3 (ref 0–0.4)
EOSINOPHIL NFR BLD AUTO: 1.5 % (ref 0.3–6.2)
ERYTHROCYTE [DISTWIDTH] IN BLOOD BY AUTOMATED COUNT: 13 % (ref 12.3–15.4)
GEN 5 2HR TROPONIN T REFLEX: 46 NG/L
GLOBULIN UR ELPH-MCNC: 2.9 GM/DL
GLUCOSE SERPL-MCNC: 128 MG/DL (ref 65–99)
HCT VFR BLD AUTO: 56 % (ref 37.5–51)
HGB BLD-MCNC: 18.4 G/DL (ref 13–17.7)
HOLD SPECIMEN: NORMAL
IMM GRANULOCYTES # BLD AUTO: 0.03 10*3/MM3 (ref 0–0.05)
IMM GRANULOCYTES NFR BLD AUTO: 0.3 % (ref 0–0.5)
LIPASE SERPL-CCNC: 24 U/L (ref 13–60)
LYMPHOCYTES # BLD AUTO: 1.91 10*3/MM3 (ref 0.7–3.1)
LYMPHOCYTES NFR BLD AUTO: 19.8 % (ref 19.6–45.3)
MAGNESIUM SERPL-MCNC: 2.1 MG/DL (ref 1.6–2.6)
MCH RBC QN AUTO: 29.4 PG (ref 26.6–33)
MCHC RBC AUTO-ENTMCNC: 32.9 G/DL (ref 31.5–35.7)
MCV RBC AUTO: 89.5 FL (ref 79–97)
MONOCYTES # BLD AUTO: 0.77 10*3/MM3 (ref 0.1–0.9)
MONOCYTES NFR BLD AUTO: 8 % (ref 5–12)
NEUTROPHILS NFR BLD AUTO: 6.76 10*3/MM3 (ref 1.7–7)
NEUTROPHILS NFR BLD AUTO: 70 % (ref 42.7–76)
NRBC BLD AUTO-RTO: 0 /100 WBC (ref 0–0.2)
NT-PROBNP SERPL-MCNC: 135 PG/ML (ref 0–900)
PLATELET # BLD AUTO: 288 10*3/MM3 (ref 140–450)
PMV BLD AUTO: 10.9 FL (ref 6–12)
POTASSIUM SERPL-SCNC: 3.9 MMOL/L (ref 3.5–5.2)
PROT SERPL-MCNC: 7.5 G/DL (ref 6–8.5)
QT INTERVAL: 290 MS
QTC INTERVAL: 416 MS
RBC # BLD AUTO: 6.26 10*6/MM3 (ref 4.14–5.8)
SODIUM SERPL-SCNC: 137 MMOL/L (ref 136–145)
T4 FREE SERPL-MCNC: 1.05 NG/DL (ref 0.92–1.68)
TROPONIN T DELTA: -6 NG/L
TROPONIN T SERPL HS-MCNC: 52 NG/L
TSH SERPL DL<=0.05 MIU/L-ACNC: 1.05 UIU/ML (ref 0.27–4.2)
WBC NRBC COR # BLD AUTO: 9.65 10*3/MM3 (ref 3.4–10.8)
WHOLE BLOOD HOLD COAG: NORMAL
WHOLE BLOOD HOLD SPECIMEN: NORMAL

## 2024-04-05 PROCEDURE — 84443 ASSAY THYROID STIM HORMONE: CPT | Performed by: EMERGENCY MEDICINE

## 2024-04-05 PROCEDURE — 85025 COMPLETE CBC W/AUTO DIFF WBC: CPT | Performed by: EMERGENCY MEDICINE

## 2024-04-05 PROCEDURE — 99291 CRITICAL CARE FIRST HOUR: CPT

## 2024-04-05 PROCEDURE — 83690 ASSAY OF LIPASE: CPT | Performed by: EMERGENCY MEDICINE

## 2024-04-05 PROCEDURE — 93005 ELECTROCARDIOGRAM TRACING: CPT

## 2024-04-05 PROCEDURE — 84439 ASSAY OF FREE THYROXINE: CPT | Performed by: EMERGENCY MEDICINE

## 2024-04-05 PROCEDURE — 93005 ELECTROCARDIOGRAM TRACING: CPT | Performed by: EMERGENCY MEDICINE

## 2024-04-05 PROCEDURE — 83880 ASSAY OF NATRIURETIC PEPTIDE: CPT | Performed by: EMERGENCY MEDICINE

## 2024-04-05 PROCEDURE — 83735 ASSAY OF MAGNESIUM: CPT | Performed by: EMERGENCY MEDICINE

## 2024-04-05 PROCEDURE — 80053 COMPREHEN METABOLIC PANEL: CPT | Performed by: EMERGENCY MEDICINE

## 2024-04-05 PROCEDURE — 84484 ASSAY OF TROPONIN QUANT: CPT | Performed by: EMERGENCY MEDICINE

## 2024-04-05 PROCEDURE — 25010000002 PROPOFOL 10 MG/ML EMULSION: Performed by: EMERGENCY MEDICINE

## 2024-04-05 PROCEDURE — 71045 X-RAY EXAM CHEST 1 VIEW: CPT

## 2024-04-05 PROCEDURE — 25010000002 MAGNESIUM SULFATE 2 GM/50ML SOLUTION: Performed by: STUDENT IN AN ORGANIZED HEALTH CARE EDUCATION/TRAINING PROGRAM

## 2024-04-05 PROCEDURE — 99222 1ST HOSP IP/OBS MODERATE 55: CPT | Performed by: STUDENT IN AN ORGANIZED HEALTH CARE EDUCATION/TRAINING PROGRAM

## 2024-04-05 RX ORDER — PANTOPRAZOLE SODIUM 40 MG/1
40 TABLET, DELAYED RELEASE ORAL DAILY PRN
Status: DISCONTINUED | OUTPATIENT
Start: 2024-04-05 | End: 2024-04-08 | Stop reason: HOSPADM

## 2024-04-05 RX ORDER — DOFETILIDE 0.5 MG/1
500 CAPSULE ORAL ONCE
Status: DISCONTINUED | OUTPATIENT
Start: 2024-04-05 | End: 2024-04-05

## 2024-04-05 RX ORDER — DOFETILIDE 0.5 MG/1
500 CAPSULE ORAL EVERY 12 HOURS SCHEDULED
Status: DISCONTINUED | OUTPATIENT
Start: 2024-04-06 | End: 2024-04-05

## 2024-04-05 RX ORDER — AZELASTINE HCL 205.5 UG/1
2 SPRAY NASAL DAILY PRN
Status: DISCONTINUED | OUTPATIENT
Start: 2024-04-05 | End: 2024-04-08 | Stop reason: HOSPADM

## 2024-04-05 RX ORDER — PROPOFOL 10 MG/ML
150 VIAL (ML) INTRAVENOUS ONCE
Status: COMPLETED | OUTPATIENT
Start: 2024-04-05 | End: 2024-04-05

## 2024-04-05 RX ORDER — CETIRIZINE HYDROCHLORIDE 10 MG/1
10 TABLET ORAL DAILY
Status: DISCONTINUED | OUTPATIENT
Start: 2024-04-06 | End: 2024-04-08 | Stop reason: HOSPADM

## 2024-04-05 RX ORDER — DOFETILIDE 0.5 MG/1
500 CAPSULE ORAL ONCE
Status: COMPLETED | OUTPATIENT
Start: 2024-04-06 | End: 2024-04-06

## 2024-04-05 RX ORDER — ASPIRIN 81 MG/1
324 TABLET, CHEWABLE ORAL ONCE
Status: DISCONTINUED | OUTPATIENT
Start: 2024-04-05 | End: 2024-04-05

## 2024-04-05 RX ORDER — PROPRANOLOL HYDROCHLORIDE 20 MG/1
40 TABLET ORAL EVERY 6 HOURS SCHEDULED
Status: DISCONTINUED | OUTPATIENT
Start: 2024-04-05 | End: 2024-04-08

## 2024-04-05 RX ORDER — DOFETILIDE 0.5 MG/1
500 CAPSULE ORAL ONCE
Status: DISCONTINUED | OUTPATIENT
Start: 2024-04-06 | End: 2024-04-05

## 2024-04-05 RX ORDER — MAGNESIUM SULFATE HEPTAHYDRATE 40 MG/ML
2 INJECTION, SOLUTION INTRAVENOUS ONCE
Status: COMPLETED | OUTPATIENT
Start: 2024-04-05 | End: 2024-04-05

## 2024-04-05 RX ORDER — NITROGLYCERIN 0.4 MG/1
0.4 TABLET SUBLINGUAL AS NEEDED
Status: DISCONTINUED | OUTPATIENT
Start: 2024-04-05 | End: 2024-04-08 | Stop reason: HOSPADM

## 2024-04-05 RX ORDER — DOFETILIDE 0.5 MG/1
500 CAPSULE ORAL EVERY 12 HOURS SCHEDULED
Status: DISCONTINUED | OUTPATIENT
Start: 2024-04-06 | End: 2024-04-08 | Stop reason: HOSPADM

## 2024-04-05 RX ORDER — ALBUTEROL SULFATE 90 UG/1
2 AEROSOL, METERED RESPIRATORY (INHALATION) EVERY 4 HOURS PRN
Status: DISCONTINUED | OUTPATIENT
Start: 2024-04-05 | End: 2024-04-08 | Stop reason: HOSPADM

## 2024-04-05 RX ORDER — DOFETILIDE 0.5 MG/1
500 CAPSULE ORAL EVERY 12 HOURS
Status: DISCONTINUED | OUTPATIENT
Start: 2024-04-05 | End: 2024-04-05

## 2024-04-05 RX ORDER — PROPOFOL 10 MG/ML
VIAL (ML) INTRAVENOUS
Status: COMPLETED | OUTPATIENT
Start: 2024-04-05 | End: 2024-04-05

## 2024-04-05 RX ORDER — SODIUM CHLORIDE 0.9 % (FLUSH) 0.9 %
10 SYRINGE (ML) INJECTION AS NEEDED
Status: DISCONTINUED | OUTPATIENT
Start: 2024-04-05 | End: 2024-04-08 | Stop reason: HOSPADM

## 2024-04-05 RX ORDER — MONTELUKAST SODIUM 10 MG/1
10 TABLET ORAL NIGHTLY
Status: DISCONTINUED | OUTPATIENT
Start: 2024-04-05 | End: 2024-04-08 | Stop reason: HOSPADM

## 2024-04-05 RX ORDER — DOFETILIDE 0.5 MG/1
500 CAPSULE ORAL ONCE
Status: COMPLETED | OUTPATIENT
Start: 2024-04-05 | End: 2024-04-05

## 2024-04-05 RX ADMIN — DOFETILIDE 500 MCG: 0.5 CAPSULE ORAL at 17:06

## 2024-04-05 RX ADMIN — MONTELUKAST 10 MG: 10 TABLET, FILM COATED ORAL at 21:08

## 2024-04-05 RX ADMIN — PROPRANOLOL HYDROCHLORIDE 40 MG: 20 TABLET ORAL at 17:06

## 2024-04-05 RX ADMIN — APIXABAN 5 MG: 5 TABLET, FILM COATED ORAL at 21:08

## 2024-04-05 RX ADMIN — PROPOFOL 150 MG: 10 INJECTION, EMULSION INTRAVENOUS at 14:32

## 2024-04-05 RX ADMIN — MAGNESIUM SULFATE HEPTAHYDRATE 2 G: 2 INJECTION, SOLUTION INTRAVENOUS at 17:19

## 2024-04-05 RX ADMIN — PROPOFOL 60 MG: 10 INJECTION, EMULSION INTRAVENOUS at 13:59

## 2024-04-05 RX ADMIN — SACUBITRIL AND VALSARTAN 1 TABLET: 49; 51 TABLET, FILM COATED ORAL at 21:08

## 2024-04-05 NOTE — PLAN OF CARE
Goal Outcome Evaluation:         Patient NSR on the monitor and on room air.  Alert and oriented times four.  Standby assist.  First dose of tikosyn given at 1700, EKG due at 2000.  Per Barrett GERARD 2g mag replacement (one dose) for mag of 2.1.  Pharmacy and cardiology dosing/lennox fleming. Bed in lowest position, phone and call light in reach.

## 2024-04-05 NOTE — H&P
"[facility] Cardiology History and Physical    4/5/2024       Subjective:      David Ravi  MELONY/MELONY  1970  0    Jayant Newton MD    CC: VT        dofetilide, 500 mcg, Oral, Once   Followed by  [START ON 4/6/2024] dofetilide, 500 mcg, Oral, Once   Followed by  [START ON 4/6/2024] dofetilide, 500 mcg, Oral, Q12H  propranolol, 40 mg, Oral, Q6H         is allergic to amoxicillin.    PROBLEM LIST:  Nonischemic cardiomyopathy complicated by ventricular tachycardia   Left heart catheterization, circa 2000 by Rohan Bautista MD, showed normal coronary arteries.  Echocardiogram, 08/20/2015, showed moderate global hypokinesis, ejection fraction of 25% to 30%.  GXT myocardial perfusion, 08/25/2015, showed dilated  LV; no reversible ischemia.   Left heart catheterization, 09/08/2015, Dr. Rohan Bautista: Normal coronary arteries, EF 30%. Placement of LifeVest.  [facility], 10/19/21, normal coronaries. EF 30-35% with severe global hypokinesis. Initiated on jardiance.   Status post Guidant single chamber ICD implantation, November 2015.   TTE 10/2023: LVEF 43% no VHD  VT RFA by Dr. Parra 3/27/24  Tikosyn initiation pending 4/5/24  Obesity, BMI 35.   Obstructive sleep apnea with CPAP.   Asthma.   GERD.   History of vertigo.    HPI:  Mr. Ravi is a 54-year-old male with the above medical history who presented to [facility] for multiple episodes of VT. He has had trouble with recurrent ventricular tachycardia and subsequent ICD shocks intermittently since September.  He recently underwent radiofrequency ablation of ventricular tachycardia circuits originating in the basal lateral left ventricle by Dr. Parra on 3/27/2024. He had done well since then without any episodes.  Earlier this morning, he called our office complaining of \"spells\" where he experienced palpitations feeling like his heart was beating out of his chest then made him feel like he was going to pass out. There was some abnormal sensations in his " chest associated with this briefly. Device interrogation showed several episodes of VT which fell under the monitor zone so he underwent no therapy from his ICD. He was previously prescribed amiodarone but it was never initiated as he elected to undergo VT ablation with the goal of not taking any antiarrhythmics. Troponinn mildly elevated c/w tachycardia. ProBNP negative for heart failure. CXR normal. EKG showed VT rate 178. Electrolytes and TSH wnl.           History  Family History   Problem Relation Age of Onset    Colon cancer Mother     Heart disease Father     Heart attack Father     No Known Problems Sister     Alcohol abuse Paternal Uncle     Alcohol abuse Maternal Grandfather     Alzheimer's disease Paternal Grandmother      Past Surgical History:   Procedure Laterality Date    CARDIAC CATHETERIZATION      CARDIAC CATHETERIZATION Left 10/19/2021    Procedure: Left Heart Cath;  Surgeon: Shankar Sanchez MD;  Location:  [facility];  Service: Cardiovascular;  Laterality: Left;    CARDIAC DEFIBRILLATOR PLACEMENT  2015    CARDIAC ELECTROPHYSIOLOGY PROCEDURE N/A 3/27/2024    Procedure: Ablation VT--DNS meds, schedule after cardiac MRI;  Surgeon: Robert Parra MD;  Location:  [facility] EP INVASIVE LOCATION;  Service: Cardiovascular;  Laterality: N/A;    COLONOSCOPY        Past Medical History:   Diagnosis Date    Asthma     CHF NYHA class I     Coronary artery disease 2000    GERD (gastroesophageal reflux disease)     Heart disease     History of vertigo     Hyperlipidemia     Nonischemic cardiomyopathy     Obesity     IRINA on CPAP     Ventricular arrhythmia 01/30/2024     Social History     Tobacco Use   Smoking Status Never    Passive exposure: Past   Smokeless Tobacco Never     Social History     Substance and Sexual Activity   Alcohol Use No     Past Surgical History:   Procedure Laterality Date    CARDIAC CATHETERIZATION      CARDIAC CATHETERIZATION Left 10/19/2021    Procedure: Left Heart  "Cath;  Surgeon: Shankar Sanchez MD;  Location:  [facility];  Service: Cardiovascular;  Laterality: Left;    CARDIAC DEFIBRILLATOR PLACEMENT  2015    CARDIAC ELECTROPHYSIOLOGY PROCEDURE N/A 3/27/2024    Procedure: Ablation VT--DNS meds, schedule after cardiac MRI;  Surgeon: Robert Parra MD;  Location: [facility] EP INVASIVE [facility];  Service: Cardiovascular;  Laterality: N/A;    COLONOSCOPY             Review of Systems  ROS  Review of systems negative or noncontributory to presenting complaints unless otherwise stated in HPI       Objective:     height is 180.3 cm (71\") and weight is 109 kg (240 lb). His oral temperature is 97.9 °F (36.6 °C). His blood pressure is 112/76 and his pulse is 91. His respiration is 26 and oxygen saturation is 98%.     Physical Exam  Constitutional:       Appearance: Normal appearance. He is normal weight.   HENT:      Head: Normocephalic.   Eyes:      Conjunctiva/sclera: Conjunctivae normal.      Pupils: Pupils are equal, round, and reactive to light.   Cardiovascular:      Rate and Rhythm: Normal rate and regular rhythm.      Heart sounds: No murmur heard.     No friction rub. No gallop.   Pulmonary:      Effort: Pulmonary effort is normal. No respiratory distress.      Breath sounds: Normal breath sounds. No wheezing, rhonchi or rales.   Abdominal:      Palpations: Abdomen is soft.      Tenderness: There is no abdominal tenderness.   Musculoskeletal:      Right lower leg: No edema.      Left lower leg: No edema.   Neurological:      General: No focal deficit present.      Mental Status: He is alert and oriented to person, place, and time.   Psychiatric:         Mood and Affect: Mood normal.         Behavior: Behavior normal.         Cardiographics  ECG: Ventricular tachycardia rate 178 bpm  TTE 10/2023: LVEF 43%, no VHD    Imaging  XR Chest 1 View    Result Date: 4/5/2024  Impression: No acute cardiopulmonary abnormality. Electronically Signed: Gerri Butler MD  " 4/5/2024 1:48 PM EDT  Workstation ID: [id number]       Lab Review   Lab Results   Component Value Date    GLUCOSE 128 (H) 04/05/2024    BUN 9 04/05/2024    CREATININE 0.95 04/05/2024    EGFRIFNONA 96 10/19/2021    BCR 9.5 04/05/2024    CO2 25.0 04/05/2024    CALCIUM 9.8 04/05/2024    ALBUMIN 4.6 04/05/2024    AST 35 04/05/2024    ALT 42 (H) 04/05/2024     Lab Results   Component Value Date    WBC 9.65 04/05/2024    HGB 18.4 (H) 04/05/2024    HCT 56.0 (H) 04/05/2024    MCV 89.5 04/05/2024     04/05/2024     Lab Results   Component Value Date    CHOL 172 10/19/2021     Lab Results   Component Value Date    TRIG 136 10/19/2021    TRIG 76 09/08/2015     Lab Results   Component Value Date    HDL 54 10/19/2021    HDL 47 09/08/2015     Lab Results   Component Value Date    HGBA1C 5.20 10/19/2021             Assessment:   Ventricular tachycardia  S/p VT RFA 3/27.24 by Dr. Adan  Returned 4/5/24 with several episodes 180-190 bpm fell into monitor zones. Zones changed to allow more VT addressed below  Tikosyn initiation pending  Nonischemic cardiomyopathy/chronic HFrEF  [facility] 10/19/21, normal coronaries. EF 30-35% with severe global hypokinesis  TTE 10/2023: LVEF 43% no VHD  Patient euvolemic on exam, spO2 >90% on room air, with normal proBNP 4/5/24  [facility] single-chamber ICD  Device interrogation showed a normally functioning device with 7.5 years on the battery.  There were multiple nonsustained and sustained VT episodes from 9 AM to just after arrival to the ER.  VT episodes with heart rates in the 180s fell onto the monitor zone which did not trigger any therapies.  Ventricular arrhythmias prior to ablation had heart rates >250        Plan:   Patient presented to [facility] ED following multiple symptomatic VT episodes this morning involving palpitations and near syncope.  He is about 10 days status post VT ablation.  He is on no antiarrhythmics on arrival  Upon review, twelve-lead EKG shows VT with right bundle branch block,  left inferior axis.  There is a transition to a negative QRS complex in lead V6.  This likely still represents a similar exit site to where we performed the prior ablation, basal lateral left ventricle.  This VT is much slower than his clinical VT prior.  I am uncertain if this means that we may have affected the circuit but not completely eliminated, or if this could be abnormal automaticity due to healing in the area of the ablation.  Patient will be admitted for Tikosyn initiation.  Patient is young and would like to avoid amiodarone if possible.  His QTc was normal on EKG in sinus rhythm earlier today.  Creatinine clearance >60.  We will start with 500 mcg twice daily dosing of Tikosyn. Serial EKGs 2-3 hours after each of first 5 doses  We will hold home carvedilol in favor of propranolol 40 mg q6 for VT.  Potentially transition to an agent without alpha-blocker effects due to his relative hypotension at home.  Could consider long-acting Toprol vs. nadolol  Will give half his normal Entresto dose for now to avoid hypotension. Can titrate up if BPs stable  ICD therapies changed to include a VT 1 zone at 160 bpm with multiple rounds of ATP and VT/VF zone >220 bpm with shocks    Scribed by Barrett Ames PA-C for Dr. Robert Parra MD 15:00 4/5/24    I, Robert Parra MD, personally performed the services described in this documentation as scribed by the above named individual in my presence, and it is both accurate and complete.  4/5/2024  16:16 EDT

## 2024-04-05 NOTE — ED PROVIDER NOTES
"Subjective   History of Present Illness  54-year-old male presents for evaluation of rapid heart rate.  Of note, the patient just recently had an ablation last week.  He is followed by Dr. Parra of cardiology.  He has an ICD in place.  He endorses compliance with his DOAC.  He has not received any \"shocks\" today.  This morning at around 9 AM he began experiencing a sensation of palpitations and rapid heart rate that has persisted since that time.  He endorses a sensation of chest pressure as well.  He currently feels like he is going to \"pass out.\"  He is unsure as to what may have triggered his rapid heart rate.  Given his ongoing symptoms this morning he came here to the ED to be evaluated.      Review of Systems   Respiratory:  Positive for chest tightness.    Cardiovascular:  Positive for palpitations.   All other systems reviewed and are negative.      Past Medical History:   Diagnosis Date    Asthma     CHF NYHA class I     Coronary artery disease 2000    GERD (gastroesophageal reflux disease)     Heart disease     History of vertigo     Hyperlipidemia     Nonischemic cardiomyopathy     Obesity     IRINA on CPAP     Ventricular arrhythmia 01/30/2024       Allergies   Allergen Reactions    Amoxicillin Anaphylaxis     difficulting breathing       Past Surgical History:   Procedure Laterality Date    CARDIAC CATHETERIZATION      CARDIAC CATHETERIZATION Left 10/19/2021    Procedure: Left Heart Cath;  Surgeon: Shankar Sanchez MD;  Location:  [facility];  Service: Cardiovascular;  Laterality: Left;    CARDIAC DEFIBRILLATOR PLACEMENT  2015    CARDIAC ELECTROPHYSIOLOGY PROCEDURE N/A 3/27/2024    Procedure: Ablation VT--DNS meds, schedule after cardiac MRI;  Surgeon: Robert Parra MD;  Location:  [facility];  Service: Cardiovascular;  Laterality: N/A;    COLONOSCOPY         Family History   Problem Relation Age of Onset    Colon cancer Mother     Heart disease Father     Heart attack " Father     No Known Problems Sister     Alcohol abuse Paternal Uncle     Alcohol abuse Maternal Grandfather     Alzheimer's disease Paternal Grandmother        Social History     Socioeconomic History    Marital status:    Tobacco Use    Smoking status: Never     Passive exposure: Past    Smokeless tobacco: Never   Vaping Use    Vaping status: Never Used    Passive vaping exposure: Yes   Substance and Sexual Activity    Alcohol use: No    Drug use: No    Sexual activity: Yes     Partners: Female           Objective   Physical Exam  Vitals and nursing note reviewed.   Constitutional:       Appearance: He is well-developed. He is not diaphoretic.      Comments: Nontoxic-appearing male   HENT:      Head: Normocephalic and atraumatic.   Neck:      Vascular: No JVD.   Cardiovascular:      Rate and Rhythm: Regular rhythm. Tachycardia present.      Heart sounds: Normal heart sounds. No murmur heard.     No friction rub. No gallop.   Pulmonary:      Effort: Pulmonary effort is normal. No respiratory distress.      Breath sounds: Normal breath sounds. No wheezing or rales.   Abdominal:      General: Bowel sounds are normal. There is no distension.      Palpations: Abdomen is soft. There is no mass.      Tenderness: There is no abdominal tenderness. There is no guarding.   Musculoskeletal:         General: Normal range of motion.      Cervical back: Normal range of motion.      Right lower leg: No edema.      Left lower leg: No edema.   Skin:     General: Skin is warm and dry.      Coloration: Skin is not pale.      Findings: No erythema or rash.   Neurological:      Mental Status: He is alert and oriented to person, place, and time.   Psychiatric:         Mood and Affect: Mood is anxious.         Thought Content: Thought content normal.         Judgment: Judgment normal.         Critical Care    Performed by: Main Paez MD  Authorized by: Main Paez MD    Critical care provider statement:      Critical care time (minutes):  41    Critical care was necessary to treat or prevent imminent or life-threatening deterioration of the following conditions:  Cardiac failure    Critical care was time spent personally by me on the following activities:  Development of treatment plan with patient or surrogate, discussions with consultants, evaluation of patient's response to treatment, examination of patient, obtaining history from patient or surrogate, ordering and performing treatments and interventions, ordering and review of laboratory studies, pulse oximetry, ordering and review of radiographic studies, re-evaluation of patient's condition and review of old charts  Procedural Sedation    Date/Time: 4/5/2024 4:46 PM    Performed by: Main Paez MD  Authorized by: Main Paez MD    Consent:     Consent obtained:  Verbal and written    Consent given by:  Patient    Risks, benefits, and alternatives were discussed: yes      Risks discussed:  Allergic reaction, dysrhythmia, inadequate sedation, nausea, vomiting, respiratory compromise necessitating ventilatory assistance and intubation and prolonged hypoxia resulting in organ damage  Universal protocol:     Procedure explained and questions answered to patient or proxy's satisfaction: yes      Relevant documents present and verified: yes      Test results available: yes      Imaging studies available: yes      Required blood products, implants, devices, and special equipment available: yes      Site/side marked: yes      Immediately prior to procedure, a time out was called: yes      Patient identity confirmed:  Verbally with patient and arm band  Indications:     Procedure performed:  Cardioversion    Procedure necessitating sedation performed by:  Physician performing sedation    Intended level of sedation:  Moderate  Pre-sedation assessment:     ASA classification: class 2 - patient with mild systemic disease      Mallampati score:  II - soft  palate, uvula, fauces visible    Neck mobility: normal      Pre-sedation assessments completed and reviewed: airway patency, anesthesia/sedation history, cardiovascular function, hydration status, mental status, nausea/vomiting, pain level, respiratory function and temperature      History of difficult intubation: no      Pre-sedation assessment completed:  4/5/2024 1:50 PM  Immediate pre-procedure details:     Reassessment: Patient reassessed immediately prior to procedure      Reviewed: vital signs, relevant labs/tests and NPO status      Verified: bag valve mask available, emergency equipment available, intubation equipment available, IV patency confirmed, oxygen available and suction available    Procedure details (see MAR for exact dosages):     Sedation start time:  4/5/2024 1:59 PM    Preoxygenation:  Nonrebreather mask    Sedation:  Propofol    Intra-procedure monitoring:  Blood pressure monitoring, cardiac monitor, continuous pulse oximetry, continuous capnometry, frequent LOC assessments and frequent vital sign checks    Intra-procedure events: none      Sedation end time:  4/5/2024 2:16 PM    Total sedation time (minutes):  17  Post-procedure details:     Post-sedation assessment completed:  4/5/2024 2:22 PM    Attendance: Constant attendance by certified staff until patient recovered      Recovery: Patient returned to pre-procedure baseline      Estimated blood loss (see I/O flowsheets): no      Complications:  N/a    Post-sedation assessments completed and reviewed: airway patency, cardiovascular function, hydration status, mental status, nausea/vomiting, pain level and respiratory function      Specimens recovered:  None    Patient is stable for discharge or admission: yes      Procedure completion:  Tolerated well, no immediate complications             ED Course  ED Course as of 04/05/24 1642   Fri Apr 05, 2024   1313 54-year-old male presents for evaluation of a rapid heart rate.  Of note, the  "patient just recently had an ablation last week.  He is followed by Dr. Parra of cardiology.  He endorses compliance with his DOAC.  This morning around 9 AM he began experiencing a sensation of palpitations and rapid heart rate that has persisted since that time.  He endorses a sensation of chest pressure as well.  He currently feels like he is going to \"pass out.\"  On arrival to the ED, the patient is markedly tachycardic.  I was asked to come and evaluate him in triage.  Differential diagnosis is quite broad.  We will obtain labs, EKG, and imaging, and we will reassess following initial interventions. [DD]   1317 After I evaluated the patient in triage, his symptoms seemed to improve a bit by the time that he got his EKG.  His initial EKG interpreted independently by me revealed sinus tachycardia with occasional PVCs and a heart rate of 124 with normal UT and QT intervals and no EKG evidence of Brugada syndrome or hypertrophic cardiomyopathy.  His initial heart rate on auscultation was probably 180-190 so his vitals seem to be trending in the right direction spontaneously.  Will get the patient to a bed and a cardiac monitor is soon as possible. [DD]   1326 We will have the patient's cardiac device interrogated as well. [DD]   1404 I was notified by the patient's nurse that he had become markedly tachycardic again.  She was able to capture this with some EKG interpreted independently by me which revealed a wide-complex tachycardia with a heart rate of 178 consistent with ventricular tachycardia.  I immediately went to the patient's bedside and he had again returned to normal sinus rhythm. [DD]   1404 Short thereafter, I was advised by the patient's nurse that he was again and sustained ventricular tachycardia.  I went into the room and after obtaining informed consent, the patient was given [DD]   1404  propofol for sedation with planned electrical cardioversion.  However, after receiving propofol, he again " returned to normal sinus rhythm before requiring electricity.  I have reached out to our cardiology team who is coming to the emergency department to personally evaluate the patient. [DD]   3114 After being evaluated by our cardiology team, they are recommending admission to the hospital under the care of Dr. Parra as they will likely be starting the patient on Tikosyn.  The patient is hemodynamically stable at this time and is aware/agreeable with the plan. [DD]      ED Course User Index  [DD] Main Paez MD                                   Recent Results (from the past 24 hour(s))   ECG 12 Lead Chest Pain    Collection Time: 04/05/24  1:14 PM   Result Value Ref Range    QT Interval 290 ms    QTC Interval 416 ms   High Sensitivity Troponin T    Collection Time: 04/05/24  1:25 PM    Specimen: Blood   Result Value Ref Range    HS Troponin T 52 (H) <22 ng/L   Comprehensive Metabolic Panel    Collection Time: 04/05/24  1:25 PM    Specimen: Blood   Result Value Ref Range    Glucose 128 (H) 65 - 99 mg/dL    BUN 9 6 - 20 mg/dL    Creatinine 0.95 0.76 - 1.27 mg/dL    Sodium 137 136 - 145 mmol/L    Potassium 3.9 3.5 - 5.2 mmol/L    Chloride 102 98 - 107 mmol/L    CO2 25.0 22.0 - 29.0 mmol/L    Calcium 9.8 8.6 - 10.5 mg/dL    Total Protein 7.5 6.0 - 8.5 g/dL    Albumin 4.6 3.5 - 5.2 g/dL    ALT (SGPT) 42 (H) 1 - 41 U/L    AST (SGOT) 35 1 - 40 U/L    Alkaline Phosphatase 135 (H) 39 - 117 U/L    Total Bilirubin 0.6 0.0 - 1.2 mg/dL    Globulin 2.9 gm/dL    A/G Ratio 1.6 g/dL    BUN/Creatinine Ratio 9.5 7.0 - 25.0    Anion Gap 10.0 5.0 - 15.0 mmol/L    eGFR 96.3 >60.0 mL/min/1.73   Lipase    Collection Time: 04/05/24  1:25 PM    Specimen: Blood   Result Value Ref Range    Lipase 24 13 - 60 U/L   BNP    Collection Time: 04/05/24  1:25 PM    Specimen: Blood   Result Value Ref Range    proBNP 135.0 0.0 - 900.0 pg/mL   Green Top (Gel)    Collection Time: 04/05/24  1:25 PM   Result Value Ref Range    Extra Tube Hold for  add-ons.    Lavender Top    Collection Time: 04/05/24  1:25 PM   Result Value Ref Range    Extra Tube hold for add-on    Gold Top - SST    Collection Time: 04/05/24  1:25 PM   Result Value Ref Range    Extra Tube Hold for add-ons.    Light Blue Top    Collection Time: 04/05/24  1:25 PM   Result Value Ref Range    Extra Tube Hold for add-ons.    CBC Auto Differential    Collection Time: 04/05/24  1:25 PM    Specimen: Blood   Result Value Ref Range    WBC 9.65 3.40 - 10.80 10*3/mm3    RBC 6.26 (H) 4.14 - 5.80 10*6/mm3    Hemoglobin 18.4 (H) 13.0 - 17.7 g/dL    Hematocrit 56.0 (H) 37.5 - 51.0 %    MCV 89.5 79.0 - 97.0 fL    MCH 29.4 26.6 - 33.0 pg    MCHC 32.9 31.5 - 35.7 g/dL    RDW 13.0 12.3 - 15.4 %    RDW-SD 42.6 37.0 - 54.0 fl    MPV 10.9 6.0 - 12.0 fL    Platelets 288 140 - 450 10*3/mm3    Neutrophil % 70.0 42.7 - 76.0 %    Lymphocyte % 19.8 19.6 - 45.3 %    Monocyte % 8.0 5.0 - 12.0 %    Eosinophil % 1.5 0.3 - 6.2 %    Basophil % 0.4 0.0 - 1.5 %    Immature Grans % 0.3 0.0 - 0.5 %    Neutrophils, Absolute 6.76 1.70 - 7.00 10*3/mm3    Lymphocytes, Absolute 1.91 0.70 - 3.10 10*3/mm3    Monocytes, Absolute 0.77 0.10 - 0.90 10*3/mm3    Eosinophils, Absolute 0.14 0.00 - 0.40 10*3/mm3    Basophils, Absolute 0.04 0.00 - 0.20 10*3/mm3    Immature Grans, Absolute 0.03 0.00 - 0.05 10*3/mm3    nRBC 0.0 0.0 - 0.2 /100 WBC   T4, Free    Collection Time: 04/05/24  1:25 PM    Specimen: Blood   Result Value Ref Range    Free T4 1.05 0.92 - 1.68 ng/dL   TSH    Collection Time: 04/05/24  1:25 PM    Specimen: Blood   Result Value Ref Range    TSH 1.050 0.270 - 4.200 uIU/mL   Magnesium    Collection Time: 04/05/24  1:25 PM    Specimen: Blood   Result Value Ref Range    Magnesium 2.1 1.6 - 2.6 mg/dL   ECG 12 Lead ED Triage Standing Order; Chest Pain    Collection Time: 04/05/24  1:47 PM   Result Value Ref Range    QT Interval 296 ms    QTC Interval 509 ms     Note: In addition to lab results from this visit, the labs listed above  may include labs taken at another facility or during a different encounter within the last 24 hours. Please correlate lab times with ED admission and discharge times for further clarification of the services performed during this visit.    XR Chest 1 View   Final Result   Impression:   No acute cardiopulmonary abnormality.         Electronically Signed: Gerri Butler MD     4/5/2024 1:48 PM EDT     Workstation ID: [id number]        Vitals:    04/05/24 1423 04/05/24 1445 04/05/24 1500 04/05/24 1600   BP:  119/82 112/76 110/76   BP Location:    Right arm   Patient Position:    Sitting   Pulse: 87 91 91 88   Resp:    16   Temp:    98.4 °F (36.9 °C)   TempSrc:    Oral   SpO2: 100% 100% 98% 96%   Weight:       Height:         Medications   sodium chloride 0.9 % flush 10 mL (has no administration in time range)   propranolol (INDERAL) tablet 40 mg (has no administration in time range)   Pharmacy Consult (has no administration in time range)   dofetilide (TIKOSYN) capsule 500 mcg (has no administration in time range)     Followed by   dofetilide (TIKOSYN) capsule 500 mcg (has no administration in time range)     Followed by   dofetilide (TIKOSYN) capsule 500 mcg (has no administration in time range)     Followed by   dofetilide (TIKOSYN) capsule 500 mcg (has no administration in time range)   Propofol (DIPRIVAN) injection 150 mg (150 mg Intravenous Given by Other 4/5/24 1432)   Propofol (DIPRIVAN) injection (60 mg Intravenous Given 4/5/24 1359)     ECG/EMG Results (last 24 hours)       Procedure Component Value Units Date/Time    ECG 12 Lead ED Triage Standing Order; Chest Pain [[id number]] Collected: 04/05/24 1347     Updated: 04/05/24 1414     QT Interval 296 ms      QTC Interval 509 ms     Narrative:      Test Reason : ED Triage Standing Order~  Blood Pressure :   */*   mmHG  Vent. Rate : 178 BPM     Atrial Rate : 163 BPM     P-R Int :   * ms          QRS Dur : 172 ms      QT Int : 296 ms       P-R-T Axes :   * 146 -20  degrees     QTc Int : 509 ms    ** Poor data quality, interpretation may be adversely affected  Wide QRS tachycardia with premature supraventricular complexes  Right bundle branch block  Left posterior fascicular block  ** Bifascicular block **  Cannot rule out Inferior infarct , age undetermined  Abnormal ECG  When compared with ECG of 05-APR-2024 13:14, (Unconfirmed)  Wide QRS tachycardia has replaced Sinus rhythm    Referred By: MARYAM           Confirmed By:     ECG 12 Lead Chest Pain [[id number]] Collected: 04/05/24 1314     Updated: 04/05/24 1640     QT Interval 290 ms      QTC Interval 416 ms     Narrative:      Test Reason : Chest Pain  Blood Pressure :   */*   mmHG  Vent. Rate : 124 BPM     Atrial Rate : 124 BPM     P-R Int : 174 ms          QRS Dur :  82 ms      QT Int : 290 ms       P-R-T Axes :  74  69  65 degrees     QTc Int : 416 ms    Sinus tachycardia with occasional premature ventricular complexes  Nonspecific T wave abnormality  Abnormal ECG  When compared with ECG of 23-FEB-2024 16:45,  Questionable change in QRS axis  Confirmed by MD Philippe, Rohan ([id number]) on 4/5/2024 4:39:49 PM    Referred By:            Confirmed By: Rohan Paez MD          ECG 12 Lead ED Triage Standing Order; Chest Pain   Preliminary Result   Test Reason : ED Triage Standing Order~   Blood Pressure :   */*   mmHG   Vent. Rate : 178 BPM     Atrial Rate : 163 BPM      P-R Int :   * ms          QRS Dur : 172 ms       QT Int : 296 ms       P-R-T Axes :   * 146 -20 degrees      QTc Int : 509 ms      ** Poor data quality, interpretation may be adversely affected   Wide QRS tachycardia with premature supraventricular complexes   Right bundle branch block   Left posterior fascicular block   ** Bifascicular block **   Cannot rule out Inferior infarct , age undetermined   Abnormal ECG   When compared with ECG of 05-APR-2024 13:14, (Unconfirmed)   Wide QRS tachycardia has replaced Sinus rhythm      Referred By: MARYAM           Confirmed By:        ECG 12 Lead Chest Pain   Final Result   Test Reason : Chest Pain   Blood Pressure :   */*   mmHG   Vent. Rate : 124 BPM     Atrial Rate : 124 BPM      P-R Int : 174 ms          QRS Dur :  82 ms       QT Int : 290 ms       P-R-T Axes :  74  69  65 degrees      QTc Int : 416 ms      Sinus tachycardia with occasional premature ventricular complexes   Nonspecific T wave abnormality   Abnormal ECG   When compared with ECG of 23-FEB-2024 16:45,   Questionable change in QRS axis   Confirmed by MD Paez Michael [id number]) on 4/5/2024 4:39:49 PM      Referred By:            Confirmed By: Rohan Paez MD      ECG 12 Lead ED Triage Standing Order; Chest Pain    (Results Pending)   ECG 12 Lead Rhythm Change    (Results Pending)       Recent Results (from the past 24 hour(s))   ECG 12 Lead Chest Pain    Collection Time: 04/05/24  1:14 PM   Result Value Ref Range    QT Interval 290 ms    QTC Interval 416 ms   High Sensitivity Troponin T    Collection Time: 04/05/24  1:25 PM    Specimen: Blood   Result Value Ref Range    HS Troponin T 52 (H) <22 ng/L   Comprehensive Metabolic Panel    Collection Time: 04/05/24  1:25 PM    Specimen: Blood   Result Value Ref Range    Glucose 128 (H) 65 - 99 mg/dL    BUN 9 6 - 20 mg/dL    Creatinine 0.95 0.76 - 1.27 mg/dL    Sodium 137 136 - 145 mmol/L    Potassium 3.9 3.5 - 5.2 mmol/L    Chloride 102 98 - 107 mmol/L    CO2 25.0 22.0 - 29.0 mmol/L    Calcium 9.8 8.6 - 10.5 mg/dL    Total Protein 7.5 6.0 - 8.5 g/dL    Albumin 4.6 3.5 - 5.2 g/dL    ALT (SGPT) 42 (H) 1 - 41 U/L    AST (SGOT) 35 1 - 40 U/L    Alkaline Phosphatase 135 (H) 39 - 117 U/L    Total Bilirubin 0.6 0.0 - 1.2 mg/dL    Globulin 2.9 gm/dL    A/G Ratio 1.6 g/dL    BUN/Creatinine Ratio 9.5 7.0 - 25.0    Anion Gap 10.0 5.0 - 15.0 mmol/L    eGFR 96.3 >60.0 mL/min/1.73   Lipase    Collection Time: 04/05/24  1:25 PM    Specimen: Blood   Result Value Ref Range    Lipase 24 13 - 60 U/L   BNP    Collection Time: 04/05/24  1:25 PM     Specimen: Blood   Result Value Ref Range    proBNP 135.0 0.0 - 900.0 pg/mL   Green Top (Gel)    Collection Time: 04/05/24  1:25 PM   Result Value Ref Range    Extra Tube Hold for add-ons.    Lavender Top    Collection Time: 04/05/24  1:25 PM   Result Value Ref Range    Extra Tube hold for add-on    Gold Top - SST    Collection Time: 04/05/24  1:25 PM   Result Value Ref Range    Extra Tube Hold for add-ons.    Light Blue Top    Collection Time: 04/05/24  1:25 PM   Result Value Ref Range    Extra Tube Hold for add-ons.    CBC Auto Differential    Collection Time: 04/05/24  1:25 PM    Specimen: Blood   Result Value Ref Range    WBC 9.65 3.40 - 10.80 10*3/mm3    RBC 6.26 (H) 4.14 - 5.80 10*6/mm3    Hemoglobin 18.4 (H) 13.0 - 17.7 g/dL    Hematocrit 56.0 (H) 37.5 - 51.0 %    MCV 89.5 79.0 - 97.0 fL    MCH 29.4 26.6 - 33.0 pg    MCHC 32.9 31.5 - 35.7 g/dL    RDW 13.0 12.3 - 15.4 %    RDW-SD 42.6 37.0 - 54.0 fl    MPV 10.9 6.0 - 12.0 fL    Platelets 288 140 - 450 10*3/mm3    Neutrophil % 70.0 42.7 - 76.0 %    Lymphocyte % 19.8 19.6 - 45.3 %    Monocyte % 8.0 5.0 - 12.0 %    Eosinophil % 1.5 0.3 - 6.2 %    Basophil % 0.4 0.0 - 1.5 %    Immature Grans % 0.3 0.0 - 0.5 %    Neutrophils, Absolute 6.76 1.70 - 7.00 10*3/mm3    Lymphocytes, Absolute 1.91 0.70 - 3.10 10*3/mm3    Monocytes, Absolute 0.77 0.10 - 0.90 10*3/mm3    Eosinophils, Absolute 0.14 0.00 - 0.40 10*3/mm3    Basophils, Absolute 0.04 0.00 - 0.20 10*3/mm3    Immature Grans, Absolute 0.03 0.00 - 0.05 10*3/mm3    nRBC 0.0 0.0 - 0.2 /100 WBC   T4, Free    Collection Time: 04/05/24  1:25 PM    Specimen: Blood   Result Value Ref Range    Free T4 1.05 0.92 - 1.68 ng/dL   TSH    Collection Time: 04/05/24  1:25 PM    Specimen: Blood   Result Value Ref Range    TSH 1.050 0.270 - 4.200 uIU/mL   Magnesium    Collection Time: 04/05/24  1:25 PM    Specimen: Blood   Result Value Ref Range    Magnesium 2.1 1.6 - 2.6 mg/dL   ECG 12 Lead ED Triage Standing Order; Chest Pain     Collection Time: 04/05/24  1:47 PM   Result Value Ref Range    QT Interval 296 ms    QTC Interval 509 ms     Note: In addition to lab results from this visit, the labs listed above may include labs taken at another facility or during a different encounter within the last 24 hours. Please correlate lab times with ED admission and discharge times for further clarification of the services performed during this visit.    XR Chest 1 View   Final Result   Impression:   No acute cardiopulmonary abnormality.         Electronically Signed: Gerri Butler MD     4/5/2024 1:48 PM EDT     Workstation ID: [id number]        Vitals:    04/05/24 1423 04/05/24 1445 04/05/24 1500 04/05/24 1600   BP:  119/82 112/76 110/76   BP Location:    Right arm   Patient Position:    Sitting   Pulse: 87 91 91 88   Resp:    16   Temp:    98.4 °F (36.9 °C)   TempSrc:    Oral   SpO2: 100% 100% 98% 96%   Weight:       Height:         Medications   sodium chloride 0.9 % flush 10 mL (has no administration in time range)   propranolol (INDERAL) tablet 40 mg (has no administration in time range)   Pharmacy Consult (has no administration in time range)   dofetilide (TIKOSYN) capsule 500 mcg (has no administration in time range)     Followed by   dofetilide (TIKOSYN) capsule 500 mcg (has no administration in time range)     Followed by   dofetilide (TIKOSYN) capsule 500 mcg (has no administration in time range)     Followed by   dofetilide (TIKOSYN) capsule 500 mcg (has no administration in time range)   Propofol (DIPRIVAN) injection 150 mg (150 mg Intravenous Given by Other 4/5/24 1432)   Propofol (DIPRIVAN) injection (60 mg Intravenous Given 4/5/24 1359)     ECG/EMG Results (last 24 hours)       Procedure Component Value Units Date/Time    ECG 12 Lead ED Triage Standing Order; Chest Pain [[id number]] Collected: 04/05/24 1347     Updated: 04/05/24 1414     QT Interval 296 ms      QTC Interval 509 ms     Narrative:      Test Reason : ED Triage Standing  Order~  Blood Pressure :   */*   mmHG  Vent. Rate : 178 BPM     Atrial Rate : 163 BPM     P-R Int :   * ms          QRS Dur : 172 ms      QT Int : 296 ms       P-R-T Axes :   * 146 -20 degrees     QTc Int : 509 ms    ** Poor data quality, interpretation may be adversely affected  Wide QRS tachycardia with premature supraventricular complexes  Right bundle branch block  Left posterior fascicular block  ** Bifascicular block **  Cannot rule out Inferior infarct , age undetermined  Abnormal ECG  When compared with ECG of 05-APR-2024 13:14, (Unconfirmed)  Wide QRS tachycardia has replaced Sinus rhythm    Referred By: EDMD           Confirmed By:     ECG 12 Lead Chest Pain [[id number]] Collected: 04/05/24 1314     Updated: 04/05/24 1640     QT Interval 290 ms      QTC Interval 416 ms     Narrative:      Test Reason : Chest Pain  Blood Pressure :   */*   mmHG  Vent. Rate : 124 BPM     Atrial Rate : 124 BPM     P-R Int : 174 ms          QRS Dur :  82 ms      QT Int : 290 ms       P-R-T Axes :  74  69  65 degrees     QTc Int : 416 ms    Sinus tachycardia with occasional premature ventricular complexes  Nonspecific T wave abnormality  Abnormal ECG  When compared with ECG of 23-FEB-2024 16:45,  Questionable change in QRS axis  Confirmed by MD Philippe, Rohan ([id number]) on 4/5/2024 4:39:49 PM    Referred By:            Confirmed By: Rohan Paez MD          ECG 12 Lead ED Triage Standing Order; Chest Pain   Preliminary Result   Test Reason : ED Triage Standing Order~   Blood Pressure :   */*   mmHG   Vent. Rate : 178 BPM     Atrial Rate : 163 BPM      P-R Int :   * ms          QRS Dur : 172 ms       QT Int : 296 ms       P-R-T Axes :   * 146 -20 degrees      QTc Int : 509 ms      ** Poor data quality, interpretation may be adversely affected   Wide QRS tachycardia with premature supraventricular complexes   Right bundle branch block   Left posterior fascicular block   ** Bifascicular block **   Cannot rule out Inferior infarct , age  undetermined   Abnormal ECG   When compared with ECG of 05-APR-2024 13:14, (Unconfirmed)   Wide QRS tachycardia has replaced Sinus rhythm      Referred By: EDMD           Confirmed By:       ECG 12 Lead Chest Pain   Final Result   Test Reason : Chest Pain   Blood Pressure :   */*   mmHG   Vent. Rate : 124 BPM     Atrial Rate : 124 BPM      P-R Int : 174 ms          QRS Dur :  82 ms       QT Int : 290 ms       P-R-T Axes :  74  69  65 degrees      QTc Int : 416 ms      Sinus tachycardia with occasional premature ventricular complexes   Nonspecific T wave abnormality   Abnormal ECG   When compared with ECG of 23-FEB-2024 16:45,   Questionable change in QRS axis   Confirmed by MD Paez Michael ([id number]) on 4/5/2024 4:39:49 PM      Referred By:            Confirmed By: Rohan Paez MD      ECG 12 Lead ED Triage Standing Order; Chest Pain    (Results Pending)   ECG 12 Lead Rhythm Change    (Results Pending)                 Medical Decision Making  Problems Addressed:  Ventricular tachycardia: complicated acute illness or injury    Amount and/or Complexity of Data Reviewed  Labs: ordered.  Radiology: ordered.  ECG/medicine tests: ordered.    Risk  OTC drugs.  Prescription drug management.  Decision regarding hospitalization.        Final diagnoses:   Ventricular tachycardia       ED Disposition  ED Disposition       ED Disposition   Decision to Admit    Condition   --    Comment   Level of Care: Telemetry [5]   Diagnosis: Ventricular tachycardia [[id number]]   Admitting Physician: LINDA TRUJILLO [[id number]]   Attending Physician: LINDA TRUJILLO [[id number]]   Certification: I Certify That Inpatient Hospital Services Are Medically Necessary For Greater Than 2 Midnights                 No follow-up provider specified.       Medication List      No changes were made to your prescriptions during this visit.            Main Paez MD  04/05/24 0724       Main Paez MD  04/05/24 8981

## 2024-04-05 NOTE — CONSULTS
"Pharmacy Consult - Tikosyn Initiation    David Ravi is a 54 y.o. male admitted for Tikosyn initiation and monitoring.    Height: 180.3 cm (71\")  Weight: 109 kg (240 lb)    Evaluation of Drug-Drug Interactions     Previous antiarrhythmic medications must be discontinued prior to admission       - Amiodarone must be discontinued >3 weeks prior to Tikosyn start       - Sotalol and class I antiarrhythmics (Dysopyramide, Flecainide, Mexiletine, Propafenone) must be discontinued >48 hours prior to Tikosyn start         - Previous antiarrhythmic therapy: none     Current drug-drug interactions noted with admission medications and Tikosyn    The following medications are contraindicated with Dofetilide and will be/have been discontinued:  - Fluoroquinolones (Ciprofloxacin, Levofloxacin, Moxifloxacin, Norfloxacin)  - Azole antifungals (Itraconazole, Ketoconazole, Posaconazole)  - Hydrochlorothiazide and any combination products containing Hydrochlorothiazide   - Trimethoprim and Trimethoprim-Sulfamethoxazole   - Verapamil  - Cimetidine  - Ziprasidone   - Dolutegravir  - Sauinavir  - Thioridazine   - Fingolimod  - Megestrol  - Pimozide  - Prochloperazine  - Lamotrigine  - Ibutilide  - Procainamide  - Macrolide antibiotics (Erythromycin, Clarithromycin)  - Quetiapine  - Citalopram    The following medications are recognized to prolong QT interval, however the medication may continue during initial Dofetilide dosing:  - Loop diuretics (Bumetanide, Furosemide, Torsemide)  - Antipsychotics (Haloperidol,  Risperidone, Asenapine)   - Antidepressants (Amitriptyline, Amoxapine, Clomipramine, Desipramine, Doxepin, Imipramine, Maprotiline, Mirtazapine, Nortriptyline, Protriptyline, Triipramine)  - Diltiazem  - Ondansetron  - Ivabradine  - Eribulin  - Paliperidone  - Phenothiazines (Chlorpromazine, Fluphenazine, Mesoridazine, Perphenazine, Promazine, Trifluoperazine)    The following medications may increase Dofetilide serum " concentrations and can be co-administered:  - Azole antifungals (Fluconazole, Voriconazole)  - SSRI's ( Escitalopram, Fluvoxamine, Fluoxetine, Paroxetine, Sertraline)  - Protease Inhibitors (Amprenavir, Indinavir, Nelfinavir, Ritonavir)  - Diltiazem   - Metformin, Glyburide  - Amiloride  - Cannabinoids  - Nefazodone  - Triamterene  - Quinine    Laboratory    Results from last 7 days   Lab Units 04/05/24  1325   SODIUM mmol/L 137   POTASSIUM mmol/L 3.9   CHLORIDE mmol/L 102   CO2 mmol/L 25.0   BUN mg/dL 9   CREATININE mg/dL 0.95   GLUCOSE mg/dL 128*   CALCIUM mg/dL 9.8     Results from last 7 days   Lab Units 04/05/24  1325   MAGNESIUM mg/dL 2.1       Pharmacy will order electrolyte replacement per protocols if indicated (Mag < 2.0, K < 4.0) based on laboratory values on admission      - Magnesium replacement protocol ordered: Yes     - Potassium replacement protocol ordered: Yes    Estimated CrCl =  114 mL/min  (Calculated with Cockroft-Gault equation using actual body weight and serum creatinine for calculation--can use laboratory values from previous 24 hours)    Initial QTc and EKG Monitoring    QTc = 509 msec     If QTc is:     < 440 msec: okay to proceed with initiation      > 440 msec: must contact MD or physician extender (GAGE/PA-C)             - Provider contacted: PA             - Okay to proceed: Yes                   If QTc  < 500 msec and a pre-existing ventricular conduction defect exists), must contact MD or physician extender (GAGE/PANuhaC)            - Provider contacted: N/A             - Okay to proceed:  N/A    Patient has a functioning atrial pacemaker - Yes     Cardiology aware, will proceed    Initial Tikosyn dose based on Creatinine Clearance:    CrCl > 60 mL/min - Dofetilide 500 mcg PO Q12H  CrCl 40-60 mL/min - Dofetilide 250 mcg PO Q12H  CrCl 20-39 mL/min - Dofetilide 125 mcg PO Q12H  CrCl < 20 mL/min - Do not start medication, contact MD    (Will dose medication 10 hour intervals until  administration times are between 7 and 9).    Assessment/Plan:    Patient admitted for Tikosyn initiation per cardiology. Will initiate Tikosyn 500 mcg PO every 12 hours.  Monitor electrolytes and drug-drug interactions.  Pharmacy will continue to follow.    Thank you for this consult.  David Keller IV, PharmD, BCPS  4/5/2024  15:58 EDT

## 2024-04-05 NOTE — ED NOTES
David Ravi    Nursing Report ED to Floor:  Mental status: A&Ox4  Ambulatory status: assist  Oxygen Therapy:  RA  Cardiac Rhythm: NSR  Admitted from: ED  Safety Concerns:  fall  Social Issues: none  ED Room #:  21    ED Nurse Phone Extension - [phone] or may call [phone].      HPI:   Chief Complaint   Patient presents with    Chest Pain    Rapid Heart Rate       Past Medical History:  Past Medical History:   Diagnosis Date    Asthma     CHF NYHA class I     Coronary artery disease 2000    GERD (gastroesophageal reflux disease)     Heart disease     History of vertigo     Hyperlipidemia     Nonischemic cardiomyopathy     Obesity     IRINA on CPAP     Ventricular arrhythmia 01/30/2024        Past Surgical History:  Past Surgical History:   Procedure Laterality Date    CARDIAC CATHETERIZATION      CARDIAC CATHETERIZATION Left 10/19/2021    Procedure: Left Heart Cath;  Surgeon: Shankar Sanchez MD;  Location:  [facility];  Service: Cardiovascular;  Laterality: Left;    CARDIAC DEFIBRILLATOR PLACEMENT  2015    CARDIAC ELECTROPHYSIOLOGY PROCEDURE N/A 3/27/2024    Procedure: Ablation VT--DNS meds, schedule after cardiac MRI;  Surgeon: Robert Parra MD;  Location:  [facility];  Service: Cardiovascular;  Laterality: N/A;    COLONOSCOPY          Admitting Doctor:   Robert Parra MD    Consulting Provider(s):  Consults       No orders found from 3/7/2024 to 4/6/2024.             Admitting Diagnosis:   There were no encounter diagnoses.    Most Recent Vitals:   Vitals:    04/05/24 1420 04/05/24 1423 04/05/24 1445 04/05/24 1500   BP: 111/79  119/82 112/76   BP Location:       Patient Position:       Pulse: 90 87 91 91   Resp:       Temp:       TempSrc:       SpO2: 100% 100% 100% 98%   Weight:       Height:           Active LDAs/IV Access:   Lines, Drains & Airways       Active LDAs       Name Placement date Placement time Site Days    Peripheral IV 04/05/24 1325 Left Antecubital 04/05/24   1325  Antecubital  less than 1                    Labs (abnormal labs have a star):   Labs Reviewed   TROPONIN - Abnormal; Notable for the following components:       Result Value    HS Troponin T 52 (*)     All other components within normal limits    Narrative:     High Sensitive Troponin T Reference Range:  <14.0 ng/L- Negative Female for AMI  <22.0 ng/L- Negative Male for AMI  >=14 - Abnormal Female indicating possible myocardial injury.  >=22 - Abnormal Male indicating possible myocardial injury.   Clinicians would have to utilize clinical acumen, EKG, Troponin, and serial changes to determine if it is an Acute Myocardial Infarction or myocardial injury due to an underlying chronic condition.        COMPREHENSIVE METABOLIC PANEL - Abnormal; Notable for the following components:    Glucose 128 (*)     ALT (SGPT) 42 (*)     Alkaline Phosphatase 135 (*)     All other components within normal limits    Narrative:     GFR Normal >60  Chronic Kidney Disease <60  Kidney Failure <15     CBC WITH AUTO DIFFERENTIAL - Abnormal; Notable for the following components:    RBC 6.26 (*)     Hemoglobin 18.4 (*)     Hematocrit 56.0 (*)     All other components within normal limits   LIPASE - Normal   BNP (IN-HOUSE) - Normal    Narrative:     This assay is used as an aid in the diagnosis of individuals suspected of having heart failure. It can be used as an aid in the diagnosis of acute decompensated heart failure (ADHF) in patients presenting with signs and symptoms of ADHF to the emergency department (ED). In addition, NT-proBNP of <300 pg/mL indicates ADHF is not likely.    Age Range Result Interpretation  NT-proBNP Concentration (pg/mL:      <50             Positive            >450                   Gray                 300-450                    Negative             <300    50-75           Positive            >900                  Gray                300-900                  Negative            <300      >75              Positive            >1800                  Gray                300-1800                  Negative            <300   T4, FREE - Normal   TSH - Normal   MAGNESIUM - Normal   RAINBOW DRAW    Narrative:     The following orders were created for panel order [place] Draw.  Procedure                               Abnormality         Status                     ---------                               -----------         ------                     Green Top (Gel)[[id number]]                                  Final result               Lavender Top[[id number]]                                     Final result               Gold Top - SST[[id number]]                                   Final result               Gray Top[[id number]]                                         In process                 Light Blue Top[[id number]]                                   Final result                 Please view results for these tests on the individual orders.   HIGH SENSITIVITIY TROPONIN T 2HR   CBC AND DIFFERENTIAL    Narrative:     The following orders were created for panel order CBC & Differential.  Procedure                               Abnormality         Status                     ---------                               -----------         ------                     CBC Auto Differential[[id number]]        Abnormal            Final result                 Please view results for these tests on the individual orders.   GREEN TOP   LAVENDER TOP   GOLD TOP - SST   LIGHT BLUE TOP   GRAY TOP       Meds Given in ED:   Medications   sodium chloride 0.9 % flush 10 mL (has no administration in time range)   aspirin chewable tablet 324 mg (324 mg Oral Not Given 4/5/24 1343)   Propofol (DIPRIVAN) injection 150 mg (150 mg Intravenous Given by Other 4/5/24 1432)   Propofol (DIPRIVAN) injection (60 mg Intravenous Given 4/5/24 1359)           Last NIH score:                                                          Dysphagia screening results:        Paulina  Coma Scale:  No data recorded     CIWA:        Restraint Type:            Isolation Status:  No active isolations

## 2024-04-06 LAB
ANION GAP SERPL CALCULATED.3IONS-SCNC: 8 MMOL/L (ref 5–15)
BUN SERPL-MCNC: 8 MG/DL (ref 6–20)
BUN/CREAT SERPL: 10.7 (ref 7–25)
CALCIUM SPEC-SCNC: 9.1 MG/DL (ref 8.6–10.5)
CHLORIDE SERPL-SCNC: 102 MMOL/L (ref 98–107)
CO2 SERPL-SCNC: 26 MMOL/L (ref 22–29)
CREAT SERPL-MCNC: 0.75 MG/DL (ref 0.76–1.27)
EGFRCR SERPLBLD CKD-EPI 2021: 108.6 ML/MIN/1.73
GLUCOSE SERPL-MCNC: 93 MG/DL (ref 65–99)
MAGNESIUM SERPL-MCNC: 2.3 MG/DL (ref 1.6–2.6)
POTASSIUM SERPL-SCNC: 3.4 MMOL/L (ref 3.5–5.2)
POTASSIUM SERPL-SCNC: 4.4 MMOL/L (ref 3.5–5.2)
SODIUM SERPL-SCNC: 136 MMOL/L (ref 136–145)

## 2024-04-06 PROCEDURE — 80048 BASIC METABOLIC PNL TOTAL CA: CPT

## 2024-04-06 PROCEDURE — 83735 ASSAY OF MAGNESIUM: CPT

## 2024-04-06 PROCEDURE — 93005 ELECTROCARDIOGRAM TRACING: CPT

## 2024-04-06 PROCEDURE — 93010 ELECTROCARDIOGRAM REPORT: CPT | Performed by: INTERNAL MEDICINE

## 2024-04-06 PROCEDURE — 93005 ELECTROCARDIOGRAM TRACING: CPT | Performed by: STUDENT IN AN ORGANIZED HEALTH CARE EDUCATION/TRAINING PROGRAM

## 2024-04-06 PROCEDURE — 84132 ASSAY OF SERUM POTASSIUM: CPT | Performed by: STUDENT IN AN ORGANIZED HEALTH CARE EDUCATION/TRAINING PROGRAM

## 2024-04-06 RX ORDER — POTASSIUM CHLORIDE 20 MEQ/1
40 TABLET, EXTENDED RELEASE ORAL EVERY 4 HOURS
Status: COMPLETED | OUTPATIENT
Start: 2024-04-06 | End: 2024-04-06

## 2024-04-06 RX ADMIN — POTASSIUM CHLORIDE 40 MEQ: 1500 TABLET, EXTENDED RELEASE ORAL at 08:49

## 2024-04-06 RX ADMIN — POTASSIUM CHLORIDE 40 MEQ: 1500 TABLET, EXTENDED RELEASE ORAL at 12:39

## 2024-04-06 RX ADMIN — DOFETILIDE 500 MCG: 0.5 CAPSULE ORAL at 12:39

## 2024-04-06 RX ADMIN — CETIRIZINE HYDROCHLORIDE 10 MG: 10 TABLET, FILM COATED ORAL at 08:49

## 2024-04-06 RX ADMIN — APIXABAN 5 MG: 5 TABLET, FILM COATED ORAL at 23:43

## 2024-04-06 RX ADMIN — APIXABAN 5 MG: 5 TABLET, FILM COATED ORAL at 08:49

## 2024-04-06 RX ADMIN — SACUBITRIL AND VALSARTAN 1 TABLET: 49; 51 TABLET, FILM COATED ORAL at 23:43

## 2024-04-06 RX ADMIN — MONTELUKAST 10 MG: 10 TABLET, FILM COATED ORAL at 23:43

## 2024-04-06 RX ADMIN — EMPAGLIFLOZIN 10 MG: 10 TABLET, FILM COATED ORAL at 08:49

## 2024-04-06 RX ADMIN — SACUBITRIL AND VALSARTAN 1 TABLET: 49; 51 TABLET, FILM COATED ORAL at 08:49

## 2024-04-06 RX ADMIN — DOFETILIDE 500 MCG: 0.5 CAPSULE ORAL at 04:11

## 2024-04-06 RX ADMIN — DOFETILIDE 500 MCG: 0.5 CAPSULE ORAL at 23:43

## 2024-04-06 NOTE — PROGRESS NOTES
"                                 [facility] Progress Note      LOS: 1 day   Patient Care Team:  Jayant Newton MD as PCP - General (Cardiology)  David Garcia PA as Physician Assistant (Cardiology)  Robert Parra MD as Consulting Physician (Cardiology)    Chief Complaint:    Chief Complaint   Patient presents with    Chest Pain    Rapid Heart Rate       Subjective     Interval History:     Patient Complaints:        Review of Systems:   A 14 point review of systems was negative except as was stated in the HPI      Objective     Vital Sign Min/Max for last 24 hours  Temp  Min: 97.9 °F (36.6 °C)  Max: 98.4 °F (36.9 °C)   BP  Min: 72/61  Max: 119/82   Pulse  Min: 69  Max: 184   Resp  Min: 16  Max: 26   SpO2  Min: 94 %  Max: 100 %   No data recorded   Weight  Min: 109 kg (240 lb)  Max: 109 kg (240 lb)     Flowsheet Rows      Flowsheet Row First Filed Value   Admission Height 180.3 cm (71\") Documented at 04/05/2024 1308   Admission Weight 109 kg (240 lb) Documented at 04/05/2024 1308            Physical Exam:  General Appearance: Alert, appears stated age and cooperative  Lungs: Clear to auscultation  Heart:: RRR   No Murmurs, Rubs or Gallops  Abdomen: Soft and nontender with adequate bowel sounds.  No organomegaly  Extremities: No cyanosis, clubbing or edema  Pulses: Pulses palpable and equal bilaterally  Skin: Warm and dry with no rash  Psych: Normal     Results Review:     I reviewed the patient's new clinical results.  Results from last 7 days   Lab Units 04/06/24  0337 04/05/24  1325   SODIUM mmol/L 136 137   POTASSIUM mmol/L 3.4* 3.9   CHLORIDE mmol/L 102 102   CO2 mmol/L 26.0 25.0   BUN mg/dL 8 9   CREATININE mg/dL 0.75* 0.95   GLUCOSE mg/dL 93 128*   CALCIUM mg/dL 9.1 9.8     Results from last 7 days   Lab Units 04/05/24  1325   WBC 10*3/mm3 9.65   HEMOGLOBIN g/dL 18.4*   HEMATOCRIT % 56.0*   PLATELETS 10*3/mm3 288     Lab Results   Lab Value Date/Time    TROPONINT 46 (H) 04/05/2024 " 1627    TROPONINT 52 (H) 04/05/2024 1325    TROPONINT 11 02/23/2024 1728                       Medication Review: yes  Current Facility-Administered Medications   Medication Dose Route Frequency Provider Last Rate Last Admin    albuterol sulfate HFA (PROVENTIL HFA;VENTOLIN HFA;PROAIR HFA) inhaler 2 puff  2 puff Inhalation Q4H PRN Barrett Ames PA-C        apixaban (ELIQUIS) tablet 5 mg  5 mg Oral Q12H Barrett Ames PA-C   5 mg at 04/05/24 2108    azelastine (ASTEPRO) 0.15 % nasal spray solution 2 spray  2 spray Each Nare Daily PRN Barrett Ames PA-C        Calcium Replacement - Follow Nurse / BPA Driven Protocol   Does not apply PRN Barrett Ames PA-C        cetirizine (zyrTEC) tablet 10 mg  10 mg Oral Daily Barrett Ames PA-C        dofetilide (TIKOSYN) capsule 500 mcg  500 mcg Oral Once Barrett Ames PA-C        Followed by    dofetilide (TIKOSYN) capsule 500 mcg  500 mcg Oral Q12H Barrett Ames PA-C        empagliflozin (JARDIANCE) tablet 10 mg  10 mg Oral Daily Barrett Ames PA-C        Magnesium Cardiology Dose Replacement - Follow Nurse / BPA Driven Protocol   Does not apply PRN Barrett Ames PA-C        montelukast (SINGULAIR) tablet 10 mg  10 mg Oral Nightly Barrett Ames PA-C   10 mg at 04/05/24 2108    nitroglycerin (NITROSTAT) SL tablet 0.4 mg  0.4 mg Sublingual PRN Barrett Ames PA-C        pantoprazole (PROTONIX) EC tablet 40 mg  40 mg Oral Daily PRN Barrett Ames PA-C        Pharmacy Consult   Does not apply Once PRN Barrett Ames PA-C        Phosphorus Replacement - Follow Nurse / BPA Driven Protocol   Does not apply PRN Barrett Ames PA-C        potassium chloride (KLOR-CON M20) CR tablet 40 mEq  40 mEq Oral Q4H Robert Parra MD        Potassium Replacement - Follow Nurse / BPA Driven Protocol   Does not apply PRN Barrett Ames PA-C        propranolol (INDERAL) tablet 40 mg  40 mg Oral Q6H Barrett Ames PA-C   40 mg at 04/05/24 1815     sacubitril-valsartan (ENTRESTO) 49-51 MG tablet 1 tablet  1 tablet Oral Q12H Barrett Ames PA-C   1 tablet at 04/05/24 2108    sodium chloride 0.9 % flush 10 mL  10 mL Intravenous PRN Barrett Ames PA-C             Ventricular tachycardia    Nonischemic cardiomyopathy    IRINA on CPAP    Ventricular arrhythmia    Cardiac defibrillator in place    Corrected  ms this morning    Impression      Nonischemic dilated cardiomyopathy  Ventricular tachycardia--dofetilide initiated this admission--RFA 3/27/2024  Guidant single-chamber ICD implanted 2015  IRINA on CPAP      Plan     Continue to monitor this weekend on dofetilide and beta-blocker  Continue treatment for HFrEF    Rohan Bautista MD   04/06/24  08:34 EDT

## 2024-04-06 NOTE — CASE MANAGEMENT/SOCIAL WORK
Discharge Planning Assessment  [facility]     Patient Name: David Ravi  MRN: [id number]  Today's Date: 4/6/2024    Admit Date: 4/5/2024    Plan: Home   Discharge Needs Assessment       Row Name 04/06/24 1102       Living Environment    People in Home spouse    Current Living Arrangements home    Potentially Unsafe Housing Conditions none    Primary Care Provided by self;spouse/significant other    Provides Primary Care For no one    Family Caregiver if Needed spouse    Family Caregiver Names Arabella @ bedside    Quality of Family Relationships helpful;involved;supportive    Living Arrangement Comments Lives in [place] co w/spouse in one level home       Resource/Environmental Concerns    Resource/Environmental Concerns none       Transition Planning    Patient/Family Anticipates Transition to home with family    Patient/Family Anticipated Services at Transition     Transportation Anticipated family or friend will provide       Discharge Needs Assessment    Equipment Currently Used at Home cpap                   Discharge Plan       Row Name 04/06/24 1104       Plan    Plan Comments I met w/patient and spouse in room to initiate IDp, d/c plan. Insurance of [employer] Replacement confirmed. Fills scripts some @ [email or web address] in [facility] ( I attempted to call however stated number not in service.) He has had issues w/medication cost @ [employer], so cheaper thru [facility]. Denies HH, home O2, DME except for Cpap which is @ bedside. Plan is home w/spouse @ d/c. CM will continue to follow.    Final Discharge Disposition Code 01 - home or self-care      Row Name 04/06/24 1103       Plan    Plan Home    Patient/Family in Agreement with Plan yes    Plan Comments I met w/patient and spouse in room to initiate IDP, d/c plan. Insurance of [employer] Medicare Replacement confirmed. Fills scripts some @ [email or web address] in [place], [place], ( I attempted to call however, stated number not  in service.) He has had issues w/medication cost @ [facility] [employer], so some medications are cheaper thru [facility] ( [employer])    Final Discharge Disposition Code 01 - home or self-care                  Continued Care and Services - Admitted Since 4/5/2024    No active coordination exists for this encounter.       Expected Discharge Date and Time       Expected Discharge Date Expected Discharge Time    Apr 8, 2024            Demographic Summary       Row Name 04/06/24 1101       General Information    Arrived From home    Referral Source outpatient staff, outpatient clinic    Preferred Language English    General Information Comments No POA/LW paperwork on file. PCP is Jayant Newton                   Functional Status       Row Name 04/06/24 1101       Functional Status    Usual Activity Tolerance good    Current Activity Tolerance moderate       Functional Status, IADL    Medications independent    Meal Preparation independent    Housekeeping independent    Laundry independent    Shopping independent       Employment/    Employment Status disabled                   Psychosocial    No documentation.                  Abuse/Neglect    No documentation.                  Legal    No documentation.                  Substance Abuse    No documentation.                  Patient Forms    No documentation.                     Harinder Browning RN

## 2024-04-06 NOTE — PLAN OF CARE
Goal Outcome Evaluation:      Patient is NSR on the monitor and on room air.  Alert and oriented times four.  Patient is up ad-sabi.  Propanolol not give, see MAR.  Pharmacy and cardiology dosing/lennox fleming. Bed in lowest position, phone and call light in reach.

## 2024-04-07 LAB
ANION GAP SERPL CALCULATED.3IONS-SCNC: 11 MMOL/L (ref 5–15)
BUN SERPL-MCNC: 12 MG/DL (ref 6–20)
BUN/CREAT SERPL: 14.1 (ref 7–25)
CALCIUM SPEC-SCNC: 9.3 MG/DL (ref 8.6–10.5)
CHLORIDE SERPL-SCNC: 104 MMOL/L (ref 98–107)
CO2 SERPL-SCNC: 26 MMOL/L (ref 22–29)
CREAT SERPL-MCNC: 0.85 MG/DL (ref 0.76–1.27)
EGFRCR SERPLBLD CKD-EPI 2021: 104.6 ML/MIN/1.73
GLUCOSE SERPL-MCNC: 87 MG/DL (ref 65–99)
MAGNESIUM SERPL-MCNC: 2 MG/DL (ref 1.6–2.6)
POTASSIUM SERPL-SCNC: 3.7 MMOL/L (ref 3.5–5.2)
QT INTERVAL: 296 MS
QT INTERVAL: 422 MS
QTC INTERVAL: 464 MS
QTC INTERVAL: 509 MS
SODIUM SERPL-SCNC: 141 MMOL/L (ref 136–145)

## 2024-04-07 PROCEDURE — 83735 ASSAY OF MAGNESIUM: CPT

## 2024-04-07 PROCEDURE — 80048 BASIC METABOLIC PNL TOTAL CA: CPT

## 2024-04-07 PROCEDURE — 93005 ELECTROCARDIOGRAM TRACING: CPT | Performed by: INTERNAL MEDICINE

## 2024-04-07 PROCEDURE — 93010 ELECTROCARDIOGRAM REPORT: CPT | Performed by: INTERNAL MEDICINE

## 2024-04-07 PROCEDURE — 93005 ELECTROCARDIOGRAM TRACING: CPT | Performed by: STUDENT IN AN ORGANIZED HEALTH CARE EDUCATION/TRAINING PROGRAM

## 2024-04-07 RX ADMIN — CETIRIZINE HYDROCHLORIDE 10 MG: 10 TABLET, FILM COATED ORAL at 09:19

## 2024-04-07 RX ADMIN — APIXABAN 5 MG: 5 TABLET, FILM COATED ORAL at 21:33

## 2024-04-07 RX ADMIN — DOFETILIDE 500 MCG: 0.5 CAPSULE ORAL at 09:19

## 2024-04-07 RX ADMIN — MONTELUKAST 10 MG: 10 TABLET, FILM COATED ORAL at 21:33

## 2024-04-07 RX ADMIN — EMPAGLIFLOZIN 10 MG: 10 TABLET, FILM COATED ORAL at 09:19

## 2024-04-07 RX ADMIN — APIXABAN 5 MG: 5 TABLET, FILM COATED ORAL at 09:19

## 2024-04-07 RX ADMIN — DOFETILIDE 500 MCG: 0.5 CAPSULE ORAL at 21:33

## 2024-04-07 RX ADMIN — SACUBITRIL AND VALSARTAN 1 TABLET: 49; 51 TABLET, FILM COATED ORAL at 21:33

## 2024-04-07 RX ADMIN — SACUBITRIL AND VALSARTAN 1 TABLET: 49; 51 TABLET, FILM COATED ORAL at 09:18

## 2024-04-07 NOTE — PROGRESS NOTES
"                                 [facility] Progress Note      LOS: 2 days   Patient Care Team:  Jayant Newton MD as PCP - General (Cardiology)  David Garcia PA as Physician Assistant (Cardiology)  Robert Parra MD as Consulting Physician (Cardiology)    Chief Complaint: VT  Chief Complaint   Patient presents with    Chest Pain    Rapid Heart Rate       Subjective     Interval History:     Patient Complaints: No chest pain or dyspnea through the night.  Asymptomatic this morning      Review of Systems:   A 14 point review of systems was negative except as was stated in the HPI      Objective     Vital Sign Min/Max for last 24 hours  Temp  Min: 97.8 °F (36.6 °C)  Max: 98.4 °F (36.9 °C)   BP  Min: 93/69  Max: 106/66   Pulse  Min: 62  Max: 82   Resp  Min: 16  Max: 20   SpO2  Min: 92 %  Max: 97 %   No data recorded   No data recorded     Flowsheet Rows      Flowsheet Row First Filed Value   Admission Height 180.3 cm (71\") Documented at 04/05/2024 1308   Admission Weight 109 kg (240 lb) Documented at 04/05/2024 1308            Physical Exam:  General Appearance: Alert, appears stated age and cooperative  Lungs: Clear to auscultation  Heart:: RRR   No Murmurs, Rubs or Gallops  Abdomen: Soft and nontender with adequate bowel sounds.  No organomegaly  Extremities: No cyanosis, clubbing or edema  Pulses: Pulses palpable and equal bilaterally  Skin: Warm and dry with no rash  Psych: Normal     Results Review:     I reviewed the patient's new clinical results.  Results from last 7 days   Lab Units 04/07/24  0434 04/06/24  1545 04/06/24  0337 04/05/24  1325   SODIUM mmol/L 141  --  136 137   POTASSIUM mmol/L 3.7 4.4 3.4* 3.9   CHLORIDE mmol/L 104  --  102 102   CO2 mmol/L 26.0  --  26.0 25.0   BUN mg/dL 12  --  8 9   CREATININE mg/dL 0.85  --  0.75* 0.95   GLUCOSE mg/dL 87  --  93 128*   CALCIUM mg/dL 9.3  --  9.1 9.8     Results from last 7 days   Lab Units 04/05/24  1325   WBC 10*3/mm3 9.65 "   HEMOGLOBIN g/dL 18.4*   HEMATOCRIT % 56.0*   PLATELETS 10*3/mm3 288     Lab Results   Lab Value Date/Time    TROPONINT 46 (H) 04/05/2024 1627    TROPONINT 52 (H) 04/05/2024 1325    TROPONINT 11 02/23/2024 1728                       Medication Review: yes  Current Facility-Administered Medications   Medication Dose Route Frequency Provider Last Rate Last Admin    albuterol sulfate HFA (PROVENTIL HFA;VENTOLIN HFA;PROAIR HFA) inhaler 2 puff  2 puff Inhalation Q4H PRN Barrett Ames PA-C        apixaban (ELIQUIS) tablet 5 mg  5 mg Oral Q12H Barrett Ames PA-C   5 mg at 04/06/24 2343    azelastine (ASTEPRO) 0.15 % nasal spray solution 2 spray  2 spray Each Nare Daily PRN Barrett Ames PA-C        Calcium Replacement - Follow Nurse / BPA Driven Protocol   Does not apply PRN Barrett Ames PA-C        cetirizine (zyrTEC) tablet 10 mg  10 mg Oral Daily Barrett Ames PA-C   10 mg at 04/06/24 0849    dofetilide (TIKOSYN) capsule 500 mcg  500 mcg Oral Q12H Barrett Ames PA-C   500 mcg at 04/06/24 2343    empagliflozin (JARDIANCE) tablet 10 mg  10 mg Oral Daily Barrett Ames PA-C   10 mg at 04/06/24 0849    Magnesium Cardiology Dose Replacement - Follow Nurse / BPA Driven Protocol   Does not apply PRN Barrett Ames PA-C        montelukast (SINGULAIR) tablet 10 mg  10 mg Oral Nightly Barrett Ames PA-C   10 mg at 04/06/24 2343    nitroglycerin (NITROSTAT) SL tablet 0.4 mg  0.4 mg Sublingual PRN Barrett Ames PA-C        pantoprazole (PROTONIX) EC tablet 40 mg  40 mg Oral Daily PRN Barrett Ames PA-C        Pharmacy to dose Tikosyn   Does not apply Once PRN Barrett Ames PA-C        Phosphorus Replacement - Follow Nurse / BPA Driven Protocol   Does not apply PRN Barrett Ames PA-C        Potassium Replacement - Follow Nurse / BPA Driven Protocol   Does not apply PRN Barrett Ames PA-C        propranolol (INDERAL) tablet 40 mg  40 mg Oral Q6H Barrett Ames PA-C   40 mg at 04/05/24 2011     sacubitril-valsartan (ENTRESTO) 49-51 MG tablet 1 tablet  1 tablet Oral Q12H Barrett Ames PA-C   1 tablet at 04/06/24 2343    sodium chloride 0.9 % flush 10 mL  10 mL Intravenous PRN Barrett Ames PA-C             Ventricular tachycardia    Nonischemic cardiomyopathy    IRINA on CPAP    Ventricular arrhythmia    Cardiac defibrillator in place    Corrected  ms this morning    Impression      Nonischemic dilated cardiomyopathy  Ventricular tachycardia--dofetilide initiated this admission--RFA 3/27/2024  Guidant single-chamber ICD implanted 2015  IRINA on CPAP    Maintaining sinus rhythm.      Plan     Continue to monitor this weekend on dofetilide and beta-blocker though not tolerating Inderal secondary to soft blood pressure  Continue treatment for HFrEF    Rohan Bautista MD   04/07/24  08:32 EDT

## 2024-04-07 NOTE — PLAN OF CARE
Goal Outcome Evaluation:      VSS. NSR on tele. . Wife at bedside.

## 2024-04-07 NOTE — PLAN OF CARE
Goal Outcome Evaluation:      Patient is NSR on the monitor and on room air.  Alert and oriented times four.  Patient is up ad-sabi.  Fifth dose of tikosyn given in the am.  Patient ambulated in hallway.  Bed in lowest position, phone and call light in reach.

## 2024-04-08 ENCOUNTER — READMISSION MANAGEMENT (OUTPATIENT)
Dept: CALL CENTER | Facility: HOSPITAL | Age: 54
End: 2024-04-08
Payer: MEDICARE

## 2024-04-08 VITALS
HEIGHT: 71 IN | RESPIRATION RATE: 18 BRPM | WEIGHT: 240.6 LBS | TEMPERATURE: 97.8 F | DIASTOLIC BLOOD PRESSURE: 80 MMHG | OXYGEN SATURATION: 94 % | HEART RATE: 84 BPM | SYSTOLIC BLOOD PRESSURE: 128 MMHG | BODY MASS INDEX: 33.68 KG/M2

## 2024-04-08 LAB
ANION GAP SERPL CALCULATED.3IONS-SCNC: 9 MMOL/L (ref 5–15)
BUN SERPL-MCNC: 11 MG/DL (ref 6–20)
BUN/CREAT SERPL: 13.6 (ref 7–25)
CALCIUM SPEC-SCNC: 9.1 MG/DL (ref 8.6–10.5)
CHLORIDE SERPL-SCNC: 101 MMOL/L (ref 98–107)
CO2 SERPL-SCNC: 28 MMOL/L (ref 22–29)
CREAT SERPL-MCNC: 0.81 MG/DL (ref 0.76–1.27)
EGFRCR SERPLBLD CKD-EPI 2021: 106.1 ML/MIN/1.73
GLUCOSE SERPL-MCNC: 98 MG/DL (ref 65–99)
MAGNESIUM SERPL-MCNC: 1.9 MG/DL (ref 1.6–2.6)
POTASSIUM SERPL-SCNC: 3.4 MMOL/L (ref 3.5–5.2)
QT INTERVAL: 418 MS
QT INTERVAL: 468 MS
QTC INTERVAL: 470 MS
QTC INTERVAL: 486 MS
SODIUM SERPL-SCNC: 138 MMOL/L (ref 136–145)

## 2024-04-08 PROCEDURE — 83735 ASSAY OF MAGNESIUM: CPT

## 2024-04-08 PROCEDURE — 80048 BASIC METABOLIC PNL TOTAL CA: CPT

## 2024-04-08 PROCEDURE — 94640 AIRWAY INHALATION TREATMENT: CPT

## 2024-04-08 PROCEDURE — 99238 HOSP IP/OBS DSCHRG MGMT 30/<: CPT

## 2024-04-08 PROCEDURE — 25010000002 MAGNESIUM SULFATE 4 GM/100ML SOLUTION: Performed by: STUDENT IN AN ORGANIZED HEALTH CARE EDUCATION/TRAINING PROGRAM

## 2024-04-08 RX ORDER — FUROSEMIDE 20 MG/1
20 TABLET ORAL DAILY PRN
Qty: 30 TABLET | Refills: 0 | Status: SHIPPED | OUTPATIENT
Start: 2024-04-08

## 2024-04-08 RX ORDER — MAGNESIUM SULFATE HEPTAHYDRATE 40 MG/ML
4 INJECTION, SOLUTION INTRAVENOUS ONCE
Qty: 100 ML | Refills: 0 | Status: COMPLETED | OUTPATIENT
Start: 2024-04-08 | End: 2024-04-08

## 2024-04-08 RX ORDER — DOFETILIDE 0.5 MG/1
500 CAPSULE ORAL EVERY 12 HOURS SCHEDULED
Qty: 120 CAPSULE | Refills: 3 | Status: SHIPPED | OUTPATIENT
Start: 2024-04-08

## 2024-04-08 RX ORDER — POTASSIUM CHLORIDE 20 MEQ/1
40 TABLET, EXTENDED RELEASE ORAL EVERY 4 HOURS
Qty: 4 TABLET | Refills: 0 | Status: COMPLETED | OUTPATIENT
Start: 2024-04-08 | End: 2024-04-08

## 2024-04-08 RX ORDER — METOPROLOL SUCCINATE 25 MG/1
25 TABLET, EXTENDED RELEASE ORAL
Qty: 90 TABLET | Refills: 2 | Status: SHIPPED | OUTPATIENT
Start: 2024-04-09

## 2024-04-08 RX ORDER — FUROSEMIDE 20 MG/1
20 TABLET ORAL DAILY PRN
Status: DISCONTINUED | OUTPATIENT
Start: 2024-04-08 | End: 2024-04-08 | Stop reason: HOSPADM

## 2024-04-08 RX ORDER — METOPROLOL SUCCINATE 25 MG/1
25 TABLET, EXTENDED RELEASE ORAL
Status: DISCONTINUED | OUTPATIENT
Start: 2024-04-09 | End: 2024-04-08 | Stop reason: HOSPADM

## 2024-04-08 RX ADMIN — POTASSIUM CHLORIDE 40 MEQ: 1500 TABLET, EXTENDED RELEASE ORAL at 08:25

## 2024-04-08 RX ADMIN — EMPAGLIFLOZIN 10 MG: 10 TABLET, FILM COATED ORAL at 08:24

## 2024-04-08 RX ADMIN — DOFETILIDE 500 MCG: 0.5 CAPSULE ORAL at 08:25

## 2024-04-08 RX ADMIN — ALBUTEROL SULFATE 2 PUFF: 90 AEROSOL, METERED RESPIRATORY (INHALATION) at 00:44

## 2024-04-08 RX ADMIN — Medication 10 ML: at 08:25

## 2024-04-08 RX ADMIN — SACUBITRIL AND VALSARTAN 1 TABLET: 49; 51 TABLET, FILM COATED ORAL at 08:25

## 2024-04-08 RX ADMIN — MAGNESIUM SULFATE IN WATER FOR 4 G: 40 INJECTION INTRAVENOUS at 08:25

## 2024-04-08 RX ADMIN — APIXABAN 5 MG: 5 TABLET, FILM COATED ORAL at 08:25

## 2024-04-08 RX ADMIN — POTASSIUM CHLORIDE 40 MEQ: 1500 TABLET, EXTENDED RELEASE ORAL at 12:36

## 2024-04-08 RX ADMIN — CETIRIZINE HYDROCHLORIDE 10 MG: 10 TABLET, FILM COATED ORAL at 08:25

## 2024-04-08 NOTE — CASE MANAGEMENT/SOCIAL WORK
Case Management Discharge Note      Final Note: Pt is being discharged today and plan is home with spouse. Pt's new prescriptions were filled at [facility] and I confirmed that pt's tikosyn did not require a prior auth.  No further discharge needs.         Selected Continued Care - Discharged on 4/8/2024 Admission date: 4/5/2024 - Discharge disposition: Home or Self Care      Destination    No services have been selected for the patient.                Durable Medical Equipment    No services have been selected for the patient.                Dialysis/Infusion    No services have been selected for the patient.                Home Medical Care    No services have been selected for the patient.                Therapy    No services have been selected for the patient.                Community Resources    No services have been selected for the patient.                Community & DME    No services have been selected for the patient.                         Final Discharge Disposition Code: 01 - home or self-care

## 2024-04-08 NOTE — DISCHARGE SUMMARY
Physician Discharge Summary     Patient ID:  David Ravi  [id number]  54 y.o.  1970    Admit date: 4/5/2024    Discharge date and time: 4/8/2024  2:33 PM     Admitting Physician: Robert Parra MD     Primary Physician: Jayant Newton MD    Discharge Physician: Barrett Ames PA-C    Admission Diagnoses: Ventricular tachycardia [I47.20]    Discharge Diagnoses:   Patient Active Problem List    Diagnosis     *Ventricular tachycardia [I47.20]     Ventricular arrhythmia [I49.9]     Cardiac defibrillator in place [Z95.810]     ICD (implantable cardioverter-defibrillator) discharge [Z45.02]     Palpitations [R00.2]     Ventricular fibrillation [I49.01]     Memory loss [R41.3]     Nonischemic cardiomyopathy [I42.8]     CHF NYHA class I [I50.9]     Obesity [E66.9]     IRINA on CPAP [G47.33]     Asthma [J45.909]     GERD (gastroesophageal reflux disease) [K21.9]        Cardiology Procedures this admission:    1. Tikosyn Initiation    UPDATED PROBLEM LIST:  Nonischemic cardiomyopathy complicated by ventricular tachycardia   Left heart catheterization, circa 2000 by Rohan Bautista MD, showed normal coronary arteries.  Echocardiogram, 08/20/2015, showed moderate global hypokinesis, ejection fraction of 25% to 30%.  GXT myocardial perfusion, 08/25/2015, showed dilated  LV; no reversible ischemia.   Left heart catheterization, 09/08/2015, Dr. Rohan Bautista: Normal coronary arteries, EF 30%. Placement of LifeVest.  [facility], 10/19/21, normal coronaries. EF 30-35% with severe global hypokinesis. Initiated on jardiance.   Status post Guidant single chamber ICD implantation, November 2015.   TTE 10/2023: LVEF 43% no VHD  Cardiac MRI 3/2024: LVEF 42% with regional akinesis in the basal inferolateral wall, no LGE visualized but LV scar assessment suboptimal due to cardiac device  VT RFA by Dr. Parra 3/27/24  Tikosyn initiation pending 4/5/24  Obesity, BMI 35.   Obstructive sleep apnea with CPAP.   Asthma.   GERD.  "  History of vertigo.    Presenting HPI 4/5/2024:  Mr. Ravi is a 54-year-old male with the above medical history who presented to [facility] for multiple episodes of VT. He has had trouble with recurrent ventricular tachycardia and subsequent ICD shocks intermittently since September.  He recently underwent radiofrequency ablation of ventricular tachycardia circuits originating in the basal lateral left ventricle by Dr. Parra on 3/27/2024. He had done well since then without any episodes.  Earlier this morning, he called our office complaining of \"spells\" where he experienced palpitations feeling like his heart was beating out of his chest then made him feel like he was going to pass out. There was some abnormal sensations in his chest associated with this briefly. Device interrogation showed several episodes of VT which fell under the monitor zone so so no therapies were triggered from his ICD. He was previously prescribed amiodarone but it was never initiated as he elected to undergo VT ablation with the goal of not taking any antiarrhythmics. Troponin mildly elevated c/w tachycardia. ProBNP negative for heart failure. CXR normal. EKG showed VT rate 178. Electrolytes and TSH wnl.     Hospital Course:   Patient was initiated on Tikosyn for ventricular arrhythmia suppression over amiodarone since the patient is only 55 y/o and wants to avoid the longterm adverse effects associated with amio. ICD therapies changed to include a VT 1 zone at 160 bpm with multiple rounds of ATP and VT/VF zone >220 bpm with shocks. His EKGs showed acceptable QTc throughout admission. Patient was asymptomatic throughout without significant ventricular arrhythmia recurrence. He did exhibit borderline blood pressures throughout admission. Propranolol 40 mg q6 was ordered for VT storm but it was never given due to borderline blood pressures. His cardiac medicine dosages were decreased accordingly in response to these pressures. See discharge " medicationss outlined below for details. Outside of the borderline blood pressures, the patient's course was unremarkable throughout and he was discharged in stable condition.    Discharge Exam:    Vitals:    04/08/24 1200   BP: 134/78   Pulse: 82   Resp:    Temp:    SpO2: 98%      General-Well Nourished, Well developed  Eyes - PERRLA  Neck- supple, No mass  CV- regular rate and rhythm, no MRG, No edema  Lung- clear bilaterally  Musc/skel - Norm strength and range of motion  Skin- warm and dry  Neuro - Alert & Oriented x 3, appropriate mood.    Disposition: Patient will be discharged to: home   Condition: Stable  Follow Up: 6/25/24 with Fransisco Garcia PA-C    Patient discharge medications:      Your medication list        START taking these medications        Instructions Last Dose Given Next Dose Due   dofetilide 500 MCG capsule  Commonly known as: TIKOSYN      Take 1 capsule by mouth Every 12 (Twelve) Hours.       furosemide 20 MG tablet  Commonly known as: LASIX      Take 1 tablet by mouth Daily As Needed (lower extremity swelling, or weight gain >2 lbs/24 hours).       metoprolol succinate XL 25 MG 24 hr tablet  Commonly known as: TOPROL-XL  Start taking on: April 9, 2024      Take 1 tablet by mouth Daily.       sacubitril-valsartan 24-26 MG tablet  Commonly known as: ENTRESTO  Replaces: Entresto  MG tablet      Take 1 tablet by mouth Every 12 (Twelve) Hours.              CONTINUE taking these medications        Instructions Last Dose Given Next Dose Due   albuterol sulfate  (90 Base) MCG/ACT inhaler  Commonly known as: PROVENTIL HFA;VENTOLIN HFA;PROAIR HFA      Inhale 2 puffs Every 4 (Four) Hours As Needed for Shortness of Air or Wheezing.       ALLERGY SERUM INJECTION      Inject  under the skin into the appropriate area as directed 1 (One) Time.       apixaban 5 MG tablet tablet  Commonly known as: ELIQUIS      Take 1 tablet by mouth 2 (Two) Times a Day.       azelastine 0.15 % solution nasal  spray  Commonly known as: ASTEPRO      2 sprays into the nostril(s) as directed by provider Daily As Needed for Allergies.       dapagliflozin Propanediol 10 MG tablet      Take 5 mg by mouth Daily.       levocetirizine 5 MG tablet  Commonly known as: XYZAL      Take 1 tablet by mouth Every Evening.       montelukast 10 MG tablet  Commonly known as: SINGULAIR      Take 1 tablet by mouth Every Night.       multivitamin with minerals tablet tablet      Take 1 tablet by mouth Daily.       nitroglycerin 0.4 MG SL tablet  Commonly known as: NITROSTAT      Place 1 tablet under the tongue As Needed for Chest Pain.       omeprazole 20 MG capsule  Commonly known as: priLOSEC      Take 1 capsule by mouth As Needed (HEARTBURN).       TYLENOL ARTHRITIS PAIN PO      Take 2 tablets by mouth As Needed (ARTHRITIS).              STOP taking these medications      carvedilol 6.25 MG tablet  Commonly known as: COREG        Entresto  MG tablet  Generic drug: sacubitril-valsartan  Replaced by: sacubitril-valsartan 24-26 MG tablet                  Where to Get Your Medications        These medications were sent to [facility] - [place] [address]      Hours: Monday to Friday 7 AM to 5:30 PM, Saturday & Sunday 8 AM to 4:30 PM Phone: [phone]   dofetilide 500 MCG capsule  furosemide 20 MG tablet  metoprolol succinate XL 25 MG 24 hr tablet       These medications were sent to [facility] - [place], [place] - [address] [place] [address] - [phone]  - [phone] [address] [address]      Phone: [phone]   sacubitril-valsartan 24-26 MG tablet         Referenced discharge instructions provided by nursing for diet and activity.        Signed:  Barrett Ames PA-C  4/8/2024  19:35 EDT

## 2024-04-10 LAB
QT INTERVAL: 396 MS
QT INTERVAL: 436 MS
QTC INTERVAL: 436 MS
QTC INTERVAL: 486 MS

## 2024-04-11 ENCOUNTER — HOSPITAL ENCOUNTER (OUTPATIENT)
Dept: CARDIOLOGY | Facility: HOSPITAL | Age: 54
Discharge: HOME | End: 2024-04-11
Payer: MEDICARE

## 2024-04-11 ENCOUNTER — LAB (OUTPATIENT)
Dept: LAB | Facility: HOSPITAL | Age: 54
End: 2024-04-11
Payer: MEDICARE

## 2024-04-11 ENCOUNTER — OFFICE VISIT (OUTPATIENT)
Dept: CARDIOLOGY | Facility: HOSPITAL | Age: 54
End: 2024-04-11
Payer: MEDICARE

## 2024-04-11 VITALS
WEIGHT: 242 LBS | OXYGEN SATURATION: 95 % | SYSTOLIC BLOOD PRESSURE: 94 MMHG | HEART RATE: 70 BPM | BODY MASS INDEX: 33.88 KG/M2 | HEIGHT: 71 IN | DIASTOLIC BLOOD PRESSURE: 53 MMHG

## 2024-04-11 DIAGNOSIS — I49.9 VENTRICULAR ARRHYTHMIA: ICD-10-CM

## 2024-04-11 DIAGNOSIS — I49.9 VENTRICULAR ARRHYTHMIA: Primary | ICD-10-CM

## 2024-04-11 DIAGNOSIS — I50.22 CHRONIC SYSTOLIC CONGESTIVE HEART FAILURE, NYHA CLASS 1: ICD-10-CM

## 2024-04-11 DIAGNOSIS — I42.8 NONISCHEMIC CARDIOMYOPATHY: ICD-10-CM

## 2024-04-11 LAB
ANION GAP SERPL CALCULATED.3IONS-SCNC: 7.8 MMOL/L (ref 5–15)
BUN SERPL-MCNC: 7 MG/DL (ref 6–20)
BUN/CREAT SERPL: 7.4 (ref 7–25)
CALCIUM SPEC-SCNC: 9.9 MG/DL (ref 8.6–10.5)
CHLORIDE SERPL-SCNC: 104 MMOL/L (ref 98–107)
CO2 SERPL-SCNC: 30.2 MMOL/L (ref 22–29)
CREAT SERPL-MCNC: 0.94 MG/DL (ref 0.76–1.27)
EGFRCR SERPLBLD CKD-EPI 2021: 97.5 ML/MIN/1.73
GLUCOSE SERPL-MCNC: 86 MG/DL (ref 65–99)
POTASSIUM SERPL-SCNC: 4.7 MMOL/L (ref 3.5–5.2)
QT INTERVAL: 442 MS
QTC INTERVAL: 480 MS
SODIUM SERPL-SCNC: 142 MMOL/L (ref 136–145)

## 2024-04-11 PROCEDURE — 93005 ELECTROCARDIOGRAM TRACING: CPT | Performed by: NURSE PRACTITIONER

## 2024-04-11 PROCEDURE — 80048 BASIC METABOLIC PNL TOTAL CA: CPT

## 2024-04-11 PROCEDURE — 36415 COLL VENOUS BLD VENIPUNCTURE: CPT

## 2024-04-11 NOTE — PROGRESS NOTES
I have received the results of your recent blood work.  Your creatinine/kidney function is within normal limits.  Your potassium is also well within normal limits at 4.7.  Based on these results, I recommend that you continue your current medication regiment.  If you have any additional questions or concerns please reach out.    Sincerely,  GAGE Floyd

## 2024-04-11 NOTE — PROGRESS NOTES
Chief Complaint  Congestive Heart Failure    Subjective      History of Present Illness {  Problem List  Visit  Diagnosis   Encounters  Notes  Medications  Labs  Result Review Imaging  Media :23}     David Ravi, 54 y.o. male with past medical history significant for ventricular tachycardia, ICD placement, ventricular fibrillation, nonischemic cardiomyopathy, CHF, IRINA with CPAP, asthma, and GERD, who presents to [facility] for Congestive Heart Failure.  Patient was recently admitted to our facility from 4/5/2024 through 4/8/2024 with chief complaint of ventricular tachycardia.  Patient had recently underwent radiofrequency ablation of ventricular tachycardia circuits originating in the basal lateral left ventricle by Dr. Parra on 3/27/2024.  Since that time, patient reached out to cardiology to endorse spells where he felt like he was having palp itations.  Device interrogation showed several episodes of VT which felt under the monitor zone so no therapies were triggered from his ICD.  Patient was admitted for Tikosyn initiation.  Patient's EKGs revealed acceptable QTc throughout admission.  Patient was ultimately discharged on 4/8 on Tikosyn, metoprolol succinate, furosemide, and a decreased dose of Entresto.      Since time of discharge from our facility, patient states he has done fairly well overall.  He has not utilized his as needed Lasix, and currently denies lower extremity edema.  At time of today's visit, he denies any chest pain or pressure as well.  He does have very mild palpitations which he states are now improved.  This is happening approximately 1-2 times per day.  He endorses mild dyspnea on exertion, and mild positional dizziness as well.  Blood pressures at home similar to today's readings typically in the 90s systolic.  He is compliant with daily weights and has not noticed any significant weight gain recently.    Of note, patient followed by  "general cardiology in [facility].      Echocardiogram 10/20/2023:    Left ventricular systolic function is mildly decreased. Calculated left ventricular EF = 42.9% Abnormal global longitudinal LV strain (GLS) = -14.5%. Left ventricle strain data was reviewed by the physician and found to be accurate. Normal left ventricular wall thickness noted. The left ventricular cavity is moderately dilated. There is left ventricular global hypokinesis noted. No evidence of a left ventricular thrombus present.    Normal right ventricular cavity size, wall thickness, systolic function and septal motion noted.    The aortic valve is grossly normal in structure. The aortic valve appears trileaflet. No significant aortic valve regurgitation is present. No hemodynamically significant aortic valve stenosis is present.    The mitral valve is structurally normal with no significant stenosis present. Trace mitral valve regurgitation is present.    The tricuspid valve is grossly normal in structure. Physiologic tricuspid valve regurgitation is present. No evidence of significant tricuspid valve stenosis is present.    Objective     Vital Signs:   Vitals:    04/11/24 1006 04/11/24 1007   BP: 99/58 94/53   BP Location: Left arm Left arm   Patient Position: Standing Sitting   Pulse: 75 70   SpO2: 95% 95%   Weight:  110 kg (242 lb)   Height:  180.3 cm (71\")     Body mass index is 33.75 kg/m².  Physical Exam  Vitals and nursing note reviewed.   Constitutional:       Appearance: Normal appearance.   HENT:      Head: Normocephalic.   Eyes:      Pupils: Pupils are equal, round, and reactive to light.   Cardiovascular:      Rate and Rhythm: Normal rate and regular rhythm.      Pulses: Normal pulses.      Heart sounds: Normal heart sounds. No murmur heard.  Pulmonary:      Effort: Pulmonary effort is normal.      Breath sounds: Normal breath sounds.   Abdominal:      General: Bowel sounds are normal.      Palpations: Abdomen is soft. "   Musculoskeletal:         General: Normal range of motion.      Cervical back: Normal range of motion.      Right lower le+ Edema present.      Left lower le+ Edema present.   Skin:     General: Skin is warm and dry.      Capillary Refill: Capillary refill takes less than 2 seconds.   Neurological:      Mental Status: He is alert and oriented to person, place, and time.   Psychiatric:         Mood and Affect: Mood normal.         Thought Content: Thought content normal.                Data Reviewed:{ Labs  Result Review  Imaging  Med Tab  Media :23}     Lab Results   Component Value Date    WBC 9.65 2024    HGB 18.4 (H) 2024    HCT 56.0 (H) 2024    MCV 89.5 2024     2024      Lab Results   Component Value Date    GLUCOSE 98 2024    BUN 11 2024    CREATININE 0.81 2024    EGFR 106.1 2024    BCR 13.6 2024    K 3.4 (L) 2024    CO2 28.0 2024    CALCIUM 9.1 2024    ALBUMIN 4.6 2024    BILITOT 0.6 2024    AST 35 2024    ALT 42 (H) 2024      Lab Results   Component Value Date    TSH 1.050 2024      Lab Results   Component Value Date    TROPONINT 46 (H) 2024      Lab Results   Component Value Date    CHOL 172 10/19/2021    CHLPL 143 2015    TRIG 136 10/19/2021    HDL 54 10/19/2021    LDL 94 10/19/2021    CARDIAC MRI SCANNED (2024)     Adult Transthoracic Echo Complete W/ Cont if Necessary Per Protocol (10/24/2023 16:37)   ECG 12 Lead QT Measurement (2024 21:44)  ECG 12 Lead QT Measurement (2024 02:11)  ECG 12 Lead QT Measurement (2024 16:57)  Assessment & Plan   Assessment and Plan {CC Problem List  Visit Diagnosis  ROS  Review (Popup)  Health Maintenance  Quality  BestPractice  Medications  SmartSets  SnapShot Encounters  Media :23}     1. Ventricular arrhythmia  -Patient with recent hospitalization for initiation of Tikosyn.  He is tolerating Tikosyn  500 mg twice daily well.  Recommend to continue  -Twelve-lead EKG today showed normal sinus rhythm, rate 71, /QTc 480.  -Currently tolerating metoprolol at 25 mg daily  - ECG 12 Lead; Future  - Basic Metabolic Panel; Future    2. Nonischemic cardiomyopathy  -Most recent echocardiogram reviewed with mildly reduced ejection fraction  -Patient currently maintained on GDMT with Entresto 24-26 mg twice daily, metoprolol succinate 25 mg daily, as needed Lasix, and Farxiga  -Briefly discussed with patient that we may decrease Entresto if his hypotension worsens further.  For now, we will leave medications as is.  -Patient currently compliant with monitoring blood pressure, and daily weights.  He denies any significant weight gain of 3 pounds in a day or 5 pounds in 1 week.  -Patient to continue follow-up with primary cardiologist in [facility].      3. Chronic systolic congestive heart failure, NYHA class 1  -Most recent echocardiogram reviewed with mildly reduced ejection fraction  -Patient currently maintained on GDMT with Entresto 24-26 mg twice daily, metoprolol succinate 25 mg daily, as needed Lasix, and Farxiga  -Briefly discussed with patient that we may decrease Entresto if his hypotension worsens further.  For now, we will leave medications as is.  -Patient currently compliant with monitoring blood pressure, and daily weights.  He denies any significant weight gain of 3 pounds in a day or 5 pounds in 1 week.  -Patient to continue follow-up with primary cardiologist in [facility].  -Patient may follow-up with heart and valve as needed or if symptoms change or worsen.  Otherwise, recommend close follow-up with EP, and Dr. Newton.       Follow Up {Instructions Charge Capture  Follow-up Communications :23}     Return if symptoms worsen or fail to improve.    Patient was given instructions and counseling regarding his condition or for health maintenance advice. Please see specific information  pulled into the AVS if appropriate.  Patient was instructed to call the [facility] with any questions, concerns, or worsening symptoms.    Dictated Utilizing Dragon Dictation   Please note that portions of this note were completed with a voice recognition program.   Part of this note may be an electronic transcription/translation of spoken language to printed text using the Dragon Dictation System.

## 2024-04-12 ENCOUNTER — READMISSION MANAGEMENT (OUTPATIENT)
Dept: CALL CENTER | Facility: HOSPITAL | Age: 54
End: 2024-04-12
Payer: MEDICARE

## 2024-04-12 LAB
QT INTERVAL: 436 MS
QTC INTERVAL: 499 MS

## 2024-04-12 NOTE — OUTREACH NOTE
Medical Week 1 Survey      Flowsheet Row Responses   [facility] patient discharged from? [facility]   Does the patient have one of the following disease processes/diagnoses(primary or secondary)? Other   Week 1 attempt successful? No   Unsuccessful attempts Attempt 1  [attempted all numbers.]            Gerri CALLAHAN - Registered Nurse

## 2024-04-17 ENCOUNTER — READMISSION MANAGEMENT (OUTPATIENT)
Dept: CALL CENTER | Facility: HOSPITAL | Age: 54
End: 2024-04-17
Payer: MEDICARE

## 2024-04-17 NOTE — OUTREACH NOTE
Medical Week 1 Survey      Flowsheet Row Responses   [facility] patient discharged from? [place]   Does the patient have one of the following disease processes/diagnoses(primary or secondary)? Other   Week 1 attempt successful? Yes   Call start time 1048   Call end time 1049   Discharge diagnosis Ventricular tachyardia   Meds reviewed with patient/caregiver? Yes   Is the patient having any side effects they believe may be caused by any medication additions or changes? No   Does the patient have all medications ordered at discharge? Yes   Is the patient taking all medications as directed (includes completed medication regime)? Yes   Does the patient have a primary care provider?  Yes   Does the patient have an appointment with their PCP within 7 days of discharge? Yes   Has the patient kept scheduled appointments due by today? Yes   Has home health visited the patient within 72 hours of discharge? N/A   Psychosocial issues? No   Did the patient receive a copy of their discharge instructions? Yes   Nursing interventions Reviewed instructions with patient   What is the patient's perception of their health status since discharge? Improving   Is the patient/caregiver able to teach back signs and symptoms related to disease process for when to call PCP? Yes   Is the patient/caregiver able to teach back signs and symptoms related to disease process for when to call 911? Yes   Is the patient/caregiver able to teach back the hierarchy of who to call/visit for symptoms/problems? PCP, Specialist, Home health nurse, Urgent Care, ED, 911 Yes   Week 1 call completed? Yes   Graduated Yes   Graduated/Revoked comments Pt reports kanchan the is doing well and is taking meds as ordered. Pt has went to his Dr esteves.   Call end time 1049            CHRISTY PETERS - Registered Nurse

## 2024-04-26 ENCOUNTER — APPOINTMENT (OUTPATIENT)
Dept: GENERAL RADIOLOGY | Facility: HOSPITAL | Age: 54
End: 2024-04-26
Payer: MEDICARE

## 2024-04-26 ENCOUNTER — HOSPITAL ENCOUNTER (OUTPATIENT)
Facility: HOSPITAL | Age: 54
Setting detail: OBSERVATION
Discharge: HOME | End: 2024-05-01
Attending: STUDENT IN AN ORGANIZED HEALTH CARE EDUCATION/TRAINING PROGRAM | Admitting: INTERNAL MEDICINE
Payer: MEDICARE

## 2024-04-26 DIAGNOSIS — R55 POSTURAL DIZZINESS WITH PRESYNCOPE: ICD-10-CM

## 2024-04-26 DIAGNOSIS — R00.2 PALPITATIONS: ICD-10-CM

## 2024-04-26 DIAGNOSIS — Z45.02 ICD (IMPLANTABLE CARDIOVERTER-DEFIBRILLATOR) DISCHARGE: Primary | ICD-10-CM

## 2024-04-26 DIAGNOSIS — Z95.810 CARDIAC DEFIBRILLATOR IN PLACE: ICD-10-CM

## 2024-04-26 DIAGNOSIS — I49.9 VENTRICULAR ARRHYTHMIA: ICD-10-CM

## 2024-04-26 DIAGNOSIS — R42 POSTURAL DIZZINESS WITH PRESYNCOPE: ICD-10-CM

## 2024-04-26 PROBLEM — I47.20 V-TACH: Status: ACTIVE | Noted: 2024-04-26

## 2024-04-26 LAB
ALBUMIN SERPL-MCNC: 4.4 G/DL (ref 3.5–5.2)
ALBUMIN/GLOB SERPL: 1.6 G/DL
ALP SERPL-CCNC: 101 U/L (ref 39–117)
ALT SERPL W P-5'-P-CCNC: 26 U/L (ref 1–41)
ANION GAP SERPL CALCULATED.3IONS-SCNC: 13 MMOL/L (ref 5–15)
AST SERPL-CCNC: 24 U/L (ref 1–40)
BASOPHILS # BLD AUTO: 0.04 10*3/MM3 (ref 0–0.2)
BASOPHILS NFR BLD AUTO: 0.4 % (ref 0–1.5)
BILIRUB SERPL-MCNC: 0.6 MG/DL (ref 0–1.2)
BUN SERPL-MCNC: 7 MG/DL (ref 6–20)
BUN/CREAT SERPL: 10 (ref 7–25)
CALCIUM SPEC-SCNC: 9.6 MG/DL (ref 8.6–10.5)
CHLORIDE SERPL-SCNC: 104 MMOL/L (ref 98–107)
CO2 SERPL-SCNC: 26 MMOL/L (ref 22–29)
CREAT SERPL-MCNC: 0.7 MG/DL (ref 0.76–1.27)
D DIMER PPP FEU-MCNC: <0.27 MCGFEU/ML (ref 0–0.52)
DEPRECATED RDW RBC AUTO: 41.4 FL (ref 37–54)
EGFRCR SERPLBLD CKD-EPI 2021: 110.9 ML/MIN/1.73
EOSINOPHIL # BLD AUTO: 0.06 10*3/MM3 (ref 0–0.4)
EOSINOPHIL NFR BLD AUTO: 0.6 % (ref 0.3–6.2)
ERYTHROCYTE [DISTWIDTH] IN BLOOD BY AUTOMATED COUNT: 12.8 % (ref 12.3–15.4)
GEN 5 2HR TROPONIN T REFLEX: 23 NG/L
GLOBULIN UR ELPH-MCNC: 2.7 GM/DL
GLUCOSE SERPL-MCNC: 98 MG/DL (ref 65–99)
HCT VFR BLD AUTO: 52.5 % (ref 37.5–51)
HGB BLD-MCNC: 17.1 G/DL (ref 13–17.7)
HOLD SPECIMEN: NORMAL
IMM GRANULOCYTES # BLD AUTO: 0.03 10*3/MM3 (ref 0–0.05)
IMM GRANULOCYTES NFR BLD AUTO: 0.3 % (ref 0–0.5)
LYMPHOCYTES # BLD AUTO: 1.81 10*3/MM3 (ref 0.7–3.1)
LYMPHOCYTES NFR BLD AUTO: 19.5 % (ref 19.6–45.3)
MAGNESIUM SERPL-MCNC: 2 MG/DL (ref 1.6–2.6)
MCH RBC QN AUTO: 28.6 PG (ref 26.6–33)
MCHC RBC AUTO-ENTMCNC: 32.6 G/DL (ref 31.5–35.7)
MCV RBC AUTO: 87.9 FL (ref 79–97)
MONOCYTES # BLD AUTO: 0.69 10*3/MM3 (ref 0.1–0.9)
MONOCYTES NFR BLD AUTO: 7.4 % (ref 5–12)
NEUTROPHILS NFR BLD AUTO: 6.66 10*3/MM3 (ref 1.7–7)
NEUTROPHILS NFR BLD AUTO: 71.8 % (ref 42.7–76)
NRBC BLD AUTO-RTO: 0 /100 WBC (ref 0–0.2)
NT-PROBNP SERPL-MCNC: 296.1 PG/ML (ref 0–900)
PHOSPHATE SERPL-MCNC: 2.8 MG/DL (ref 2.5–4.5)
PLATELET # BLD AUTO: 201 10*3/MM3 (ref 140–450)
PMV BLD AUTO: 11.1 FL (ref 6–12)
POTASSIUM SERPL-SCNC: 3.4 MMOL/L (ref 3.5–5.2)
PROT SERPL-MCNC: 7.1 G/DL (ref 6–8.5)
RBC # BLD AUTO: 5.97 10*6/MM3 (ref 4.14–5.8)
SODIUM SERPL-SCNC: 143 MMOL/L (ref 136–145)
T4 FREE SERPL-MCNC: 1.07 NG/DL (ref 0.92–1.68)
TROPONIN T DELTA: -2 NG/L
TROPONIN T SERPL HS-MCNC: 25 NG/L
TROPONIN T SERPL HS-MCNC: 25 NG/L
TSH SERPL DL<=0.05 MIU/L-ACNC: 1.36 UIU/ML (ref 0.27–4.2)
WBC NRBC COR # BLD AUTO: 9.29 10*3/MM3 (ref 3.4–10.8)
WHOLE BLOOD HOLD COAG: NORMAL
WHOLE BLOOD HOLD SPECIMEN: NORMAL

## 2024-04-26 PROCEDURE — 85025 COMPLETE CBC W/AUTO DIFF WBC: CPT | Performed by: STUDENT IN AN ORGANIZED HEALTH CARE EDUCATION/TRAINING PROGRAM

## 2024-04-26 PROCEDURE — 84443 ASSAY THYROID STIM HORMONE: CPT | Performed by: STUDENT IN AN ORGANIZED HEALTH CARE EDUCATION/TRAINING PROGRAM

## 2024-04-26 PROCEDURE — 93005 ELECTROCARDIOGRAM TRACING: CPT | Performed by: STUDENT IN AN ORGANIZED HEALTH CARE EDUCATION/TRAINING PROGRAM

## 2024-04-26 PROCEDURE — 85379 FIBRIN DEGRADATION QUANT: CPT | Performed by: STUDENT IN AN ORGANIZED HEALTH CARE EDUCATION/TRAINING PROGRAM

## 2024-04-26 PROCEDURE — 83735 ASSAY OF MAGNESIUM: CPT | Performed by: STUDENT IN AN ORGANIZED HEALTH CARE EDUCATION/TRAINING PROGRAM

## 2024-04-26 PROCEDURE — 84100 ASSAY OF PHOSPHORUS: CPT | Performed by: STUDENT IN AN ORGANIZED HEALTH CARE EDUCATION/TRAINING PROGRAM

## 2024-04-26 PROCEDURE — 84484 ASSAY OF TROPONIN QUANT: CPT | Performed by: STUDENT IN AN ORGANIZED HEALTH CARE EDUCATION/TRAINING PROGRAM

## 2024-04-26 PROCEDURE — 71045 X-RAY EXAM CHEST 1 VIEW: CPT

## 2024-04-26 PROCEDURE — 83880 ASSAY OF NATRIURETIC PEPTIDE: CPT | Performed by: STUDENT IN AN ORGANIZED HEALTH CARE EDUCATION/TRAINING PROGRAM

## 2024-04-26 PROCEDURE — 93005 ELECTROCARDIOGRAM TRACING: CPT

## 2024-04-26 PROCEDURE — 99285 EMERGENCY DEPT VISIT HI MDM: CPT

## 2024-04-26 PROCEDURE — 80053 COMPREHEN METABOLIC PANEL: CPT | Performed by: STUDENT IN AN ORGANIZED HEALTH CARE EDUCATION/TRAINING PROGRAM

## 2024-04-26 PROCEDURE — 93282 PRGRMG EVAL IMPLANTABLE DFB: CPT | Performed by: INTERNAL MEDICINE

## 2024-04-26 PROCEDURE — 84439 ASSAY OF FREE THYROXINE: CPT | Performed by: STUDENT IN AN ORGANIZED HEALTH CARE EDUCATION/TRAINING PROGRAM

## 2024-04-26 PROCEDURE — 36415 COLL VENOUS BLD VENIPUNCTURE: CPT

## 2024-04-26 RX ORDER — SODIUM CHLORIDE 0.9 % (FLUSH) 0.9 %
10 SYRINGE (ML) INJECTION AS NEEDED
Status: DISCONTINUED | OUTPATIENT
Start: 2024-04-26 | End: 2024-05-01 | Stop reason: HOSPADM

## 2024-04-26 RX ORDER — POTASSIUM CHLORIDE 750 MG/1
20 CAPSULE, EXTENDED RELEASE ORAL ONCE
Status: COMPLETED | OUTPATIENT
Start: 2024-04-26 | End: 2024-04-26

## 2024-04-26 RX ADMIN — POTASSIUM CHLORIDE 20 MEQ: 750 CAPSULE, EXTENDED RELEASE ORAL at 21:41

## 2024-04-26 NOTE — Clinical Note
Level of Care: Telemetry [5]   Diagnosis: V-tach [[id number]]   Admitting Physician: SID FREED [[id number]]   Attending Physician: SID FREED [[id number]]

## 2024-04-27 LAB
ANION GAP SERPL CALCULATED.3IONS-SCNC: 10 MMOL/L (ref 5–15)
BUN SERPL-MCNC: 6 MG/DL (ref 6–20)
BUN/CREAT SERPL: 8.6 (ref 7–25)
CALCIUM SPEC-SCNC: 8.8 MG/DL (ref 8.6–10.5)
CHLORIDE SERPL-SCNC: 105 MMOL/L (ref 98–107)
CO2 SERPL-SCNC: 27 MMOL/L (ref 22–29)
CREAT SERPL-MCNC: 0.7 MG/DL (ref 0.76–1.27)
EGFRCR SERPLBLD CKD-EPI 2021: 110.9 ML/MIN/1.73
GLUCOSE SERPL-MCNC: 91 MG/DL (ref 65–99)
HBA1C MFR BLD: 5.3 % (ref 4.8–5.6)
MAGNESIUM SERPL-MCNC: 2.5 MG/DL (ref 1.6–2.6)
POTASSIUM SERPL-SCNC: 3.4 MMOL/L (ref 3.5–5.2)
POTASSIUM SERPL-SCNC: 4.3 MMOL/L (ref 3.5–5.2)
QT INTERVAL: 440 MS
QTC INTERVAL: 478 MS
SODIUM SERPL-SCNC: 142 MMOL/L (ref 136–145)

## 2024-04-27 PROCEDURE — 84132 ASSAY OF SERUM POTASSIUM: CPT | Performed by: FAMILY MEDICINE

## 2024-04-27 PROCEDURE — 83735 ASSAY OF MAGNESIUM: CPT | Performed by: STUDENT IN AN ORGANIZED HEALTH CARE EDUCATION/TRAINING PROGRAM

## 2024-04-27 PROCEDURE — G0378 HOSPITAL OBSERVATION PER HR: HCPCS

## 2024-04-27 PROCEDURE — 93010 ELECTROCARDIOGRAM REPORT: CPT | Performed by: INTERNAL MEDICINE

## 2024-04-27 PROCEDURE — 99222 1ST HOSP IP/OBS MODERATE 55: CPT | Performed by: STUDENT IN AN ORGANIZED HEALTH CARE EDUCATION/TRAINING PROGRAM

## 2024-04-27 PROCEDURE — 83036 HEMOGLOBIN GLYCOSYLATED A1C: CPT | Performed by: STUDENT IN AN ORGANIZED HEALTH CARE EDUCATION/TRAINING PROGRAM

## 2024-04-27 PROCEDURE — 93005 ELECTROCARDIOGRAM TRACING: CPT | Performed by: STUDENT IN AN ORGANIZED HEALTH CARE EDUCATION/TRAINING PROGRAM

## 2024-04-27 PROCEDURE — 25010000002 HEPARIN (PORCINE) PER 1000 UNITS: Performed by: STUDENT IN AN ORGANIZED HEALTH CARE EDUCATION/TRAINING PROGRAM

## 2024-04-27 PROCEDURE — 99214 OFFICE O/P EST MOD 30 MIN: CPT | Performed by: INTERNAL MEDICINE

## 2024-04-27 PROCEDURE — 80048 BASIC METABOLIC PNL TOTAL CA: CPT | Performed by: STUDENT IN AN ORGANIZED HEALTH CARE EDUCATION/TRAINING PROGRAM

## 2024-04-27 PROCEDURE — 96372 THER/PROPH/DIAG INJ SC/IM: CPT

## 2024-04-27 PROCEDURE — 93005 ELECTROCARDIOGRAM TRACING: CPT | Performed by: INTERNAL MEDICINE

## 2024-04-27 RX ORDER — SODIUM CHLORIDE 9 MG/ML
40 INJECTION, SOLUTION INTRAVENOUS AS NEEDED
Status: DISCONTINUED | OUTPATIENT
Start: 2024-04-27 | End: 2024-05-01 | Stop reason: HOSPADM

## 2024-04-27 RX ORDER — HEPARIN SODIUM 5000 [USP'U]/ML
5000 INJECTION, SOLUTION INTRAVENOUS; SUBCUTANEOUS EVERY 8 HOURS SCHEDULED
Status: DISCONTINUED | OUTPATIENT
Start: 2024-04-27 | End: 2024-05-01 | Stop reason: HOSPADM

## 2024-04-27 RX ORDER — ACETAMINOPHEN 325 MG/1
650 TABLET ORAL EVERY 4 HOURS PRN
Status: DISCONTINUED | OUTPATIENT
Start: 2024-04-27 | End: 2024-05-01 | Stop reason: HOSPADM

## 2024-04-27 RX ORDER — MULTIPLE VITAMINS W/ MINERALS TAB 9MG-400MCG
1 TAB ORAL DAILY
Status: DISCONTINUED | OUTPATIENT
Start: 2024-04-27 | End: 2024-05-01 | Stop reason: HOSPADM

## 2024-04-27 RX ORDER — DOFETILIDE 0.5 MG/1
500 CAPSULE ORAL EVERY 12 HOURS SCHEDULED
Status: DISCONTINUED | OUTPATIENT
Start: 2024-04-27 | End: 2024-05-01 | Stop reason: HOSPADM

## 2024-04-27 RX ORDER — BISACODYL 5 MG/1
5 TABLET, DELAYED RELEASE ORAL DAILY PRN
Status: DISCONTINUED | OUTPATIENT
Start: 2024-04-27 | End: 2024-05-01 | Stop reason: HOSPADM

## 2024-04-27 RX ORDER — AMOXICILLIN 250 MG
2 CAPSULE ORAL 2 TIMES DAILY PRN
Status: DISCONTINUED | OUTPATIENT
Start: 2024-04-27 | End: 2024-05-01 | Stop reason: HOSPADM

## 2024-04-27 RX ORDER — POLYETHYLENE GLYCOL 3350 17 G/17G
17 POWDER, FOR SOLUTION ORAL DAILY PRN
Status: DISCONTINUED | OUTPATIENT
Start: 2024-04-27 | End: 2024-05-01 | Stop reason: HOSPADM

## 2024-04-27 RX ORDER — SODIUM CHLORIDE 0.9 % (FLUSH) 0.9 %
10 SYRINGE (ML) INJECTION EVERY 12 HOURS SCHEDULED
Status: DISCONTINUED | OUTPATIENT
Start: 2024-04-27 | End: 2024-05-01 | Stop reason: HOSPADM

## 2024-04-27 RX ORDER — SODIUM CHLORIDE 0.9 % (FLUSH) 0.9 %
10 SYRINGE (ML) INJECTION AS NEEDED
Status: DISCONTINUED | OUTPATIENT
Start: 2024-04-27 | End: 2024-05-01 | Stop reason: HOSPADM

## 2024-04-27 RX ORDER — POTASSIUM CHLORIDE 20 MEQ/1
40 TABLET, EXTENDED RELEASE ORAL EVERY 4 HOURS
Status: COMPLETED | OUTPATIENT
Start: 2024-04-27 | End: 2024-04-27

## 2024-04-27 RX ORDER — MEXILETINE HYDROCHLORIDE 150 MG/1
150 CAPSULE ORAL EVERY 8 HOURS SCHEDULED
Status: DISCONTINUED | OUTPATIENT
Start: 2024-04-27 | End: 2024-05-01 | Stop reason: HOSPADM

## 2024-04-27 RX ORDER — ALBUTEROL SULFATE 2.5 MG/3ML
2.5 SOLUTION RESPIRATORY (INHALATION) EVERY 4 HOURS PRN
Status: DISCONTINUED | OUTPATIENT
Start: 2024-04-27 | End: 2024-05-01 | Stop reason: HOSPADM

## 2024-04-27 RX ORDER — METOPROLOL SUCCINATE 25 MG/1
25 TABLET, EXTENDED RELEASE ORAL NIGHTLY
Status: DISCONTINUED | OUTPATIENT
Start: 2024-04-27 | End: 2024-05-01 | Stop reason: HOSPADM

## 2024-04-27 RX ORDER — MONTELUKAST SODIUM 10 MG/1
10 TABLET ORAL NIGHTLY
Status: DISCONTINUED | OUTPATIENT
Start: 2024-04-27 | End: 2024-05-01 | Stop reason: HOSPADM

## 2024-04-27 RX ORDER — BISACODYL 10 MG
10 SUPPOSITORY, RECTAL RECTAL DAILY PRN
Status: DISCONTINUED | OUTPATIENT
Start: 2024-04-27 | End: 2024-05-01 | Stop reason: HOSPADM

## 2024-04-27 RX ORDER — FUROSEMIDE 20 MG/1
20 TABLET ORAL DAILY PRN
Status: DISCONTINUED | OUTPATIENT
Start: 2024-04-27 | End: 2024-05-01 | Stop reason: HOSPADM

## 2024-04-27 RX ADMIN — Medication 1 TABLET: at 08:34

## 2024-04-27 RX ADMIN — MONTELUKAST 10 MG: 10 TABLET, FILM COATED ORAL at 22:16

## 2024-04-27 RX ADMIN — MEXILETINE HYDROCHLORIDE 150 MG: 150 CAPSULE ORAL at 14:16

## 2024-04-27 RX ADMIN — MEXILETINE HYDROCHLORIDE 150 MG: 150 CAPSULE ORAL at 22:21

## 2024-04-27 RX ADMIN — POTASSIUM CHLORIDE 40 MEQ: 1500 TABLET, EXTENDED RELEASE ORAL at 11:54

## 2024-04-27 RX ADMIN — DOFETILIDE 500 MCG: 0.5 CAPSULE ORAL at 08:34

## 2024-04-27 RX ADMIN — Medication 10 ML: at 08:34

## 2024-04-27 RX ADMIN — DOFETILIDE 500 MCG: 0.5 CAPSULE ORAL at 22:16

## 2024-04-27 RX ADMIN — HEPARIN SODIUM 5000 UNITS: 5000 INJECTION INTRAVENOUS; SUBCUTANEOUS at 05:53

## 2024-04-27 RX ADMIN — METOPROLOL SUCCINATE 25 MG: 25 TABLET, EXTENDED RELEASE ORAL at 22:16

## 2024-04-27 RX ADMIN — EMPAGLIFLOZIN 10 MG: 10 TABLET, FILM COATED ORAL at 08:34

## 2024-04-27 RX ADMIN — HEPARIN SODIUM 5000 UNITS: 5000 INJECTION INTRAVENOUS; SUBCUTANEOUS at 22:16

## 2024-04-27 RX ADMIN — SACUBITRIL AND VALSARTAN 1 TABLET: 24; 26 TABLET, FILM COATED ORAL at 22:16

## 2024-04-27 RX ADMIN — Medication 10 ML: at 22:15

## 2024-04-27 RX ADMIN — Medication 10 ML: at 01:29

## 2024-04-27 RX ADMIN — POTASSIUM CHLORIDE 40 MEQ: 1500 TABLET, EXTENDED RELEASE ORAL at 07:02

## 2024-04-27 RX ADMIN — HEPARIN SODIUM 5000 UNITS: 5000 INJECTION INTRAVENOUS; SUBCUTANEOUS at 14:16

## 2024-04-27 NOTE — ED NOTES
David Ravi    Nursing Report ED to Floor:  Mental status: A&Ox4  Ambulatory status: Up independently  Oxygen Therapy:  RA  Cardiac Rhythm: NS  Admitted from: Home  Safety Concerns:  None  Social Issues: None  ED Room #:  3037    ED Nurse Phone Extension - [phone] or may call [phone].      HPI:   Chief Complaint   Patient presents with    Irregular Heart Beat       Past Medical History:  Past Medical History:   Diagnosis Date    Arrhythmia     Asthma     CHF NYHA class I     Coronary artery disease 2000    GERD (gastroesophageal reflux disease)     Heart disease     History of vertigo     Hyperlipidemia     Nonischemic cardiomyopathy     Obesity     IRINA on CPAP     Ventricular arrhythmia 01/30/2024        Past Surgical History:  Past Surgical History:   Procedure Laterality Date    ABLATION OF DYSRHYTHMIC FOCUS      CARDIAC CATHETERIZATION      CARDIAC CATHETERIZATION Left 10/19/2021    Procedure: Left Heart Cath;  Surgeon: Shankar Sanchez MD;  Location:  [facility];  Service: Cardiovascular;  Laterality: Left;    CARDIAC DEFIBRILLATOR PLACEMENT  2015    CARDIAC ELECTROPHYSIOLOGY PROCEDURE N/A 03/27/2024    Procedure: Ablation VT--DNS meds, schedule after cardiac MRI;  Surgeon: Robert Parra MD;  Location:  [facility];  Service: Cardiovascular;  Laterality: N/A;    COLONOSCOPY          Admitting Doctor:   Jean Paul Boateng MD    Consulting Provider(s):  Consults       No orders found from 3/28/2024 to 4/27/2024.             Admitting Diagnosis:   The primary encounter diagnosis was ICD (implantable cardioverter-defibrillator) discharge. Diagnoses of Ventricular arrhythmia, Cardiac defibrillator in place, Postural dizziness with presyncope, and Palpitations were also pertinent to this visit.    Most Recent Vitals:   Vitals:    04/26/24 2100 04/26/24 2132 04/26/24 2137 04/26/24 2142   BP: 127/80 120/75     BP Location:       Patient Position:       Pulse: 85 80 86 82   Resp:        Temp:       TempSrc:       SpO2: 96% 96% 97% 98%   Weight:       Height:           Active LDAs/IV Access:   Lines, Drains & Airways       Active LDAs       None                    Labs (abnormal labs have a star):   Labs Reviewed   COMPREHENSIVE METABOLIC PANEL - Abnormal; Notable for the following components:       Result Value    Creatinine 0.70 (*)     Potassium 3.4 (*)     All other components within normal limits    Narrative:     GFR Normal >60  Chronic Kidney Disease <60  Kidney Failure <15     SINGLE HS TROPONIN T - Abnormal; Notable for the following components:    HS Troponin T 25 (*)     All other components within normal limits    Narrative:     High Sensitive Troponin T Reference Range:  <14.0 ng/L- Negative Female for AMI  <22.0 ng/L- Negative Male for AMI  >=14 - Abnormal Female indicating possible myocardial injury.  >=22 - Abnormal Male indicating possible myocardial injury.   Clinicians would have to utilize clinical acumen, EKG, Troponin, and serial changes to determine if it is an Acute Myocardial Infarction or myocardial injury due to an underlying chronic condition.        CBC WITH AUTO DIFFERENTIAL - Abnormal; Notable for the following components:    RBC 5.97 (*)     Hematocrit 52.5 (*)     Lymphocyte % 19.5 (*)     All other components within normal limits   TROPONIN - Abnormal; Notable for the following components:    HS Troponin T 25 (*)     All other components within normal limits    Narrative:     High Sensitive Troponin T Reference Range:  <14.0 ng/L- Negative Female for AMI  <22.0 ng/L- Negative Male for AMI  >=14 - Abnormal Female indicating possible myocardial injury.  >=22 - Abnormal Male indicating possible myocardial injury.   Clinicians would have to utilize clinical acumen, EKG, Troponin, and serial changes to determine if it is an Acute Myocardial Infarction or myocardial injury due to an underlying chronic condition.        HIGH SENSITIVITIY TROPONIN T 2HR - Abnormal;  Notable for the following components:    HS Troponin T 23 (*)     All other components within normal limits    Narrative:     High Sensitive Troponin T Reference Range:  <14.0 ng/L- Negative Female for AMI  <22.0 ng/L- Negative Male for AMI  >=14 - Abnormal Female indicating possible myocardial injury.  >=22 - Abnormal Male indicating possible myocardial injury.   Clinicians would have to utilize clinical acumen, EKG, Troponin, and serial changes to determine if it is an Acute Myocardial Infarction or myocardial injury due to an underlying chronic condition.        MAGNESIUM - Normal   TSH - Normal   BNP (IN-HOUSE) - Normal    Narrative:     This assay is used as an aid in the diagnosis of individuals suspected of having heart failure. It can be used as an aid in the diagnosis of acute decompensated heart failure (ADHF) in patients presenting with signs and symptoms of ADHF to the emergency department (ED). In addition, NT-proBNP of <300 pg/mL indicates ADHF is not likely.    Age Range Result Interpretation  NT-proBNP Concentration (pg/mL:      <50             Positive            >450                   Gray                 300-450                    Negative             <300    50-75           Positive            >900                  Gray                300-900                  Negative            <300      >75             Positive            >1800                  Gray                300-1800                  Negative            <300   D-DIMER, QUANTITATIVE - Normal    Narrative:     According to the assay 's published package insert, a normal (<0.50 MCGFEU/mL) D-dimer result in conjunction with a non-high clinical probability assessment, excludes deep vein thrombosis (DVT) and pulmonary embolism (PE) with high sensitivity.    D-dimer values increase with age and this can make VTE exclusion of an older population difficult. To address this, the [employer], based on best available  "evidence and recent guidelines, recommends that clinicians use age-adjusted D-dimer thresholds in patients greater than 50 years of age with: a) a low probability of PE who do not meet all Pulmonary Embolism Rule Out Criteria, or b) in those with intermediate probability of PE.   The formula for an age-adjusted D-dimer cut-off is \"age/100\".  For example, a 60 year old patient would have an age-adjusted cut-off of 0.60 MCGFEU/mL and an 80 year old 0.80 MCGFEU/mL.   T4, FREE - Normal   PHOSPHORUS - Normal   RAINBOW DRAW    Narrative:     The following orders were created for panel order [facility] Draw.  Procedure                               Abnormality         Status                     ---------                               -----------         ------                     Green Top (Gel)[[id number]]                                  Final result               Lavender Top[[id number]]                                     Final result               Gold Top - SST[[id number]]                                   Final result               Gray Top[[id number]]                                         Final result               Light Blue Top[[id number]]                                   Final result                 Please view results for these tests on the individual orders.   CBC AND DIFFERENTIAL    Narrative:     The following orders were created for panel order CBC & Differential.  Procedure                               Abnormality         Status                     ---------                               -----------         ------                     CBC Auto Differential[[id number]]        Abnormal            Final result                 Please view results for these tests on the individual orders.   GREEN TOP   LAVENDER TOP   GOLD TOP - SST   GRAY TOP   LIGHT BLUE TOP       Meds Given in ED:   Medications   sodium chloride 0.9 % flush 10 mL (has no administration in time range)   potassium chloride (MICRO-K/KLOR-CON) CR " capsule (20 mEq Oral Given 4/26/24 2141)           Last NIH score:                                                          Dysphagia screening results:        Paulina Coma Scale:  No data recorded     CIWA:        Restraint Type:            Isolation Status:  No active isolations

## 2024-04-27 NOTE — ED PROVIDER NOTES
EMERGENCY DEPARTMENT ENCOUNTER    Pt Name: David Ravi  MRN: [id number]  Pt :   1970  Room Number:    Date of encounter:  2024  PCP: Jayant Newton MD  ED Provider: Sukhdeep Ventura MD    Historian: Patient, spouse      HPI:  Chief Complaint: ICD discharge, presyncope        Context: David Ravi is a 54-year-old man who presents to the emergency department after ICD discharge and presyncope.  He says he felt palpitations and lightheaded and nearly lost consciousness, then he felt his ICD discharge with improvement in his symptoms.  He follows with an out-of-town cardiologist who recently decreased his carvedilol, and started him on Entresto.  He denies recent illness or fevers.  No other complaints at this time     PAST MEDICAL HISTORY  Past Medical History:   Diagnosis Date    Arrhythmia     Asthma     CHF NYHA class I     Coronary artery disease     GERD (gastroesophageal reflux disease)     Heart disease     History of vertigo     Hyperlipidemia     Nonischemic cardiomyopathy     Obesity     IRINA on CPAP     Ventricular arrhythmia 2024         PAST SURGICAL HISTORY  Past Surgical History:   Procedure Laterality Date    ABLATION OF DYSRHYTHMIC FOCUS      CARDIAC CATHETERIZATION      CARDIAC CATHETERIZATION Left 10/19/2021    Procedure: Left Heart Cath;  Surgeon: Shankar Sanchez MD;  Location:  [facility];  Service: Cardiovascular;  Laterality: Left;    CARDIAC DEFIBRILLATOR PLACEMENT      CARDIAC ELECTROPHYSIOLOGY PROCEDURE N/A 2024    Procedure: Ablation VT--DNS meds, schedule after cardiac MRI;  Surgeon: Robert Parra MD;  Location:  [facility];  Service: Cardiovascular;  Laterality: N/A;    COLONOSCOPY           FAMILY HISTORY  Family History   Problem Relation Age of Onset    Colon cancer Mother     Heart disease Father     Heart attack Father     No Known Problems Sister     Alcohol abuse Paternal Uncle      Alcohol abuse Maternal Grandfather     Alzheimer's disease Paternal Grandmother          SOCIAL HISTORY  Social History     Socioeconomic History    Marital status:    Tobacco Use    Smoking status: Never     Passive exposure: Past    Smokeless tobacco: Never   Vaping Use    Vaping status: Never Used    Passive vaping exposure: Yes   Substance and Sexual Activity    Alcohol use: No    Drug use: No    Sexual activity: Yes     Partners: Female         ALLERGIES  Amoxicillin        REVIEW OF SYSTEMS  Review of Systems       All systems reviewed and negative except for those discussed in HPI.       PHYSICAL EXAM    I have reviewed the triage vital signs and nursing notes.    ED Triage Vitals [04/26/24 1912]   Temp Heart Rate Resp BP SpO2   98.7 °F (37.1 °C) 106 20 130/75 96 %      Temp src Heart Rate Source Patient Position BP Location FiO2 (%)   Oral Monitor Sitting Left arm --       Physical Exam  GENERAL:   Appears in no acute distress.   HENT: Nares patent.  EYES: No scleral icterus.  CV: Regular rhythm, regular rate.  RESPIRATORY: Normal effort.  No audible wheezes, rales or rhonchi.  ABDOMEN: Soft, nontender  MUSCULOSKELETAL: No deformities.   NEURO: Alert, moves all extremities, follows commands.  SKIN: Warm, dry, no rash visualized.      LAB RESULTS  Recent Results (from the past 24 hour(s))   ECG 12 Lead ED Triage Standing Order; Dysrhythmia    Collection Time: 04/26/24  7:18 PM   Result Value Ref Range    QT Interval 368 ms    QTC Interval 460 ms   Comprehensive Metabolic Panel    Collection Time: 04/26/24  7:56 PM    Specimen: Blood   Result Value Ref Range    Glucose 98 65 - 99 mg/dL    BUN 7 6 - 20 mg/dL    Creatinine 0.70 (L) 0.76 - 1.27 mg/dL    Sodium 143 136 - 145 mmol/L    Potassium 3.4 (L) 3.5 - 5.2 mmol/L    Chloride 104 98 - 107 mmol/L    CO2 26.0 22.0 - 29.0 mmol/L    Calcium 9.6 8.6 - 10.5 mg/dL    Total Protein 7.1 6.0 - 8.5 g/dL    Albumin 4.4 3.5 - 5.2 g/dL    ALT (SGPT) 26 1 - 41 U/L     AST (SGOT) 24 1 - 40 U/L    Alkaline Phosphatase 101 39 - 117 U/L    Total Bilirubin 0.6 0.0 - 1.2 mg/dL    Globulin 2.7 gm/dL    A/G Ratio 1.6 g/dL    BUN/Creatinine Ratio 10.0 7.0 - 25.0    Anion Gap 13.0 5.0 - 15.0 mmol/L    eGFR 110.9 >60.0 mL/min/1.73   Magnesium    Collection Time: 04/26/24  7:56 PM    Specimen: Blood   Result Value Ref Range    Magnesium 2.0 1.6 - 2.6 mg/dL   Single High Sensitivity Troponin T    Collection Time: 04/26/24  7:56 PM    Specimen: Blood   Result Value Ref Range    HS Troponin T 25 (H) <22 ng/L   TSH    Collection Time: 04/26/24  7:56 PM    Specimen: Blood   Result Value Ref Range    TSH 1.360 0.270 - 4.200 uIU/mL   BNP    Collection Time: 04/26/24  7:56 PM    Specimen: Blood   Result Value Ref Range    proBNP 296.1 0.0 - 900.0 pg/mL   Green Top (Gel)    Collection Time: 04/26/24  7:56 PM   Result Value Ref Range    Extra Tube Hold for add-ons.    Lavender Top    Collection Time: 04/26/24  7:56 PM   Result Value Ref Range    Extra Tube hold for add-on    Gold Top - SST    Collection Time: 04/26/24  7:56 PM   Result Value Ref Range    Extra Tube Hold for add-ons.    Gray Top    Collection Time: 04/26/24  7:56 PM   Result Value Ref Range    Extra Tube Hold for add-ons.    Light Blue Top    Collection Time: 04/26/24  7:56 PM   Result Value Ref Range    Extra Tube Hold for add-ons.    CBC Auto Differential    Collection Time: 04/26/24  7:56 PM    Specimen: Blood   Result Value Ref Range    WBC 9.29 3.40 - 10.80 10*3/mm3    RBC 5.97 (H) 4.14 - 5.80 10*6/mm3    Hemoglobin 17.1 13.0 - 17.7 g/dL    Hematocrit 52.5 (H) 37.5 - 51.0 %    MCV 87.9 79.0 - 97.0 fL    MCH 28.6 26.6 - 33.0 pg    MCHC 32.6 31.5 - 35.7 g/dL    RDW 12.8 12.3 - 15.4 %    RDW-SD 41.4 37.0 - 54.0 fl    MPV 11.1 6.0 - 12.0 fL    Platelets 201 140 - 450 10*3/mm3    Neutrophil % 71.8 42.7 - 76.0 %    Lymphocyte % 19.5 (L) 19.6 - 45.3 %    Monocyte % 7.4 5.0 - 12.0 %    Eosinophil % 0.6 0.3 - 6.2 %    Basophil % 0.4 0.0 -  1.5 %    Immature Grans % 0.3 0.0 - 0.5 %    Neutrophils, Absolute 6.66 1.70 - 7.00 10*3/mm3    Lymphocytes, Absolute 1.81 0.70 - 3.10 10*3/mm3    Monocytes, Absolute 0.69 0.10 - 0.90 10*3/mm3    Eosinophils, Absolute 0.06 0.00 - 0.40 10*3/mm3    Basophils, Absolute 0.04 0.00 - 0.20 10*3/mm3    Immature Grans, Absolute 0.03 0.00 - 0.05 10*3/mm3    nRBC 0.0 0.0 - 0.2 /100 WBC   High Sensitivity Troponin T    Collection Time: 04/26/24  7:56 PM    Specimen: Blood   Result Value Ref Range    HS Troponin T 25 (H) <22 ng/L   D-dimer, Quantitative    Collection Time: 04/26/24  7:56 PM    Specimen: Blood   Result Value Ref Range    D-Dimer, Quantitative <0.27 0.00 - 0.52 MCGFEU/mL   T4, Free    Collection Time: 04/26/24  7:56 PM    Specimen: Blood   Result Value Ref Range    Free T4 1.07 0.92 - 1.68 ng/dL   Phosphorus    Collection Time: 04/26/24  7:56 PM    Specimen: Blood   Result Value Ref Range    Phosphorus 2.8 2.5 - 4.5 mg/dL       If labs were ordered, I independently reviewed the results and considered them in treating the patient.        RADIOLOGY  XR Chest 1 View    Result Date: 4/26/2024  XR CHEST 1 VW Date of Exam: 4/26/2024 7:19 PM EDT Indication: Dysrhythmia triage protocol Comparison: 4/5/2024 Findings: There is a left subclavian single lead AICD. Heart size borderline. Pulmonary vasculature within normal limits. Pulse generator obscures the peripheral left midlung. Visualized lungs clear. Costophrenic angle sharp     Impression: No active cardiopulmonary disease Electronically Signed: Joel Jonas  4/26/2024 7:42 PM EDT  Workstation ID: [id number]     I ordered and independently reviewed the above noted radiographic studies.      I viewed images of chest x-ray which showed no pathology per my independent interpretation.    See radiologist's dictation for official interpretation.        PROCEDURES    Procedures    ECG 12 Lead ED Triage Standing Order; Dysrhythmia   Preliminary Result   Test Reason : ED Triage  Standing Order~   Blood Pressure :   */*   mmHG   Vent. Rate :  94 BPM     Atrial Rate :  94 BPM      P-R Int : 160 ms          QRS Dur :  88 ms       QT Int : 368 ms       P-R-T Axes :  54   3  53 degrees      QTc Int : 460 ms      Sinus rhythm with occasional premature ventricular complexes   Low voltage QRS   Cannot rule out Anterior infarct , age undetermined   Abnormal ECG   When compared with ECG of 11-APR-2024 10:03,   premature ventricular complexes are now present      Referred By: EDMD           Confirmed By:           MEDICATIONS GIVEN IN ER    Medications   sodium chloride 0.9 % flush 10 mL (has no administration in time range)   potassium chloride (MICRO-K/KLOR-CON) CR capsule (20 mEq Oral Given 4/26/24 2141)         MEDICAL DECISION MAKING, PROGRESS, and CONSULTS    All labs, if obtained, have been independently reviewed by me.  All radiology studies, if obtained, have been reviewed by me and the radiologist dictating the report.  All EKG's, if obtained, have been independently viewed and interpreted by me/my attending physician.      Discussion below represents my analysis of pertinent findings related to patient's condition, differential diagnosis, treatment plan and final disposition.                         Differential diagnosis:    Ventricular arrhythmia, atrial arrhythmia, myocardial infarction, pulmonary embolism, hyperthyroidism, pneumonia, pneumothorax, anemia, electrolyte abnormality      Additional sources:    - Discussed/ obtained information from independent historians: Spouse    - External (non-ED) record review:  Chart review of recent hospitalization with Dr. Parra for ventricular tachycardia started on Tikosyn shows history of:  Ventricular tachycardia [I47.20]     · Ventricular arrhythmia [I49.9]    · Cardiac defibrillator in place [Z95.810]    · ICD (implantable cardioverter-defibrillator) discharge [Z45.02]    · Palpitations [R00.2]    · Ventricular fibrillation [I49.01]     · Memory loss [R41.3]    · Nonischemic cardiomyopathy [I42.8]    · CHF NYHA class I [I50.9]    · Obesity [E66.9]    · IRINA on CPAP [G47.33]    · Asthma [J45.909]    · GERD (gastroesophageal reflux disease) [K21.9]     - Chronic or social conditions impacting care: Ventricular arrhythmia, ICD, obesity, sleep apnea, asthma, GERD    - Shared decision making: Agreeable to hospital admission      Orders placed during this visit:  Orders Placed This Encounter   Procedures    XR Chest 1 View    [place] Draw    Comprehensive Metabolic Panel    Magnesium    Single High Sensitivity Troponin T    TSH    BNP    CBC Auto Differential    High Sensitivity Troponin T    D-dimer, Quantitative    T4, Free    Phosphorus    High Sensitivity Troponin T 2Hr    NPO Diet NPO Type: Strict NPO    Undress & Gown    Continuous Pulse Oximetry    Device Interrogation. Device type? Pacemaker; Company to contact? [employer] [phone]    Oxygen Therapy- Nasal Cannula; Titrate 1-6 LPM Per SpO2; 90 - 95%    ECG 12 Lead ED Triage Standing Order; Dysrhythmia    Insert Peripheral IV    CBC & Differential    Green Top (Gel)    Lavender Top    Gold Top - SST    Gray Top    Light Blue Top         Additional orders considered but not ordered:      ED Course:    Consultants: Cardiology, hospitalist    ED Course as of 04/26/24 2210 Fri Apr 26, 2024   1932 Chart review of recent hospitalization with Dr. Parra for ventricular tachycardia started on Tikosyn shows history of:  Ventricular tachycardia [I47.20]     · Ventricular arrhythmia [I49.9]    · Cardiac defibrillator in place [Z95.810]    · ICD (implantable cardioverter-defibrillator) discharge [Z45.02]    · Palpitations [R00.2]    · Ventricular fibrillation [I49.01]    · Memory loss [R41.3]    · Nonischemic cardiomyopathy [I42.8]    · CHF NYHA class I [I50.9]    · Obesity [E66.9]    · IRINA on CPAP [G47.33]    · Asthma [J45.909]    · GERD (gastroesophageal reflux disease) [K21.9]      [CC]    1937 This very nice 54-year-old man who presents to the emergency department after ICD discharge and presyncope.  He says he felt palpitations and lightheaded and nearly lost consciousness, then he felt his ICD discharge with improvement in his symptoms.  He follows with an out-of-town cardiologist who recently decreased his carvedilol, and started him on Entresto.  He denies recent illness or fevers.  No other complaints at this time [CC]   1938 He arrived awake and alert initially mildly tachycardic this resolved without intervention and now vitals are within normal limits.  ECG shows sinus rhythm with a single PVC.  Obtaining full cardiac electrolyte workup.  Interrogating his [employer] ICD.  Will reevaluate pending initial workup. [CC]   2209 CBC reassuring and nonactionable.  Metabolic panel also generally reassuring he does have very mild hypokalemia 3.4 but there also may be slight hemolysis so repleted with oral potassium.  No elevation in D-dimer  Mildly elevated initial troponin will need trended.  Normal TSH.  Normal magnesium.  Still awaiting report from [facility] discussed with Dr. Moreno the on-call cardiologist who does not recommend initiating any medications right now but because of the patient's complexity does recommend admission for observation on the monitor overnight and further cardiac workup.  Patient is agreeable this plan.  Medicine team consulted for admission. [CC]      ED Course User Index  [CC] Sukhdeep Ventura MD              Shared Decision Making:  After my consideration of clinical presentation and any laboratory/radiology studies obtained, I discussed the findings with the patient/patient representative who is in agreement with the treatment plan and the final disposition.   Risks and benefits of discharge and/or observation/admission were discussed.       AS OF 22:10 EDT VITALS:    BP - 120/75  HR - 82  TEMP - 98.7 °F (37.1 °C) (Oral)  O2 SATS - 98%                   DIAGNOSIS  Final diagnoses:   ICD (implantable cardioverter-defibrillator) discharge   Ventricular arrhythmia   Cardiac defibrillator in place   Postural dizziness with presyncope   Palpitations         DISPOSITION  Admit      Please note that portions of this document were completed with voice recognition software.        Sukhdeep Ventura MD  04/26/24 6056

## 2024-04-27 NOTE — PLAN OF CARE
Patient was admitted earlier today.  I reviewed the H&P and assessment and plan by my colleague.  Patient resting comfortably in bed denies any further ICD discharge.    History of nonischemic cardiomyopathy and compensated chronic systolic heart failure with subsequent V. tach and ICD in place  ICD discharge  -Status post radioablation in March of this year and admission earlier this month for Tikosyn loading.  Has been adherent to all medications and reports no recent changes in health or heart failure symptoms.  Potassium is low but otherwise labs are okay.  Troponin mildly elevated now downtrending.  He does appear to have frequent PVCs on telemetry.  He does report increased urinary frequency since recent dose increase of home Farxiga, but does not appear dehydrated.  ED consulted cardiology who recommends observation tonight and no additional medications at this time  -Awaiting interrogation results of [employer] pacemaker  -Replete potassium  -Repeat EKG in the morning prior to next dose Tikosyn.  Continue metoprolol and Entresto (hold parameters), SGLT2 inhibitor  -Cardiology to evaluate and appreciate recommendations  -Echo ordered and pending  -Cardiology added mexiletine 04/27     Hypokalemia  -Cardiac replacement protocol ordered     IRINA  -Continue home CPAP, has been adherent        DVT prophylaxis:  Medical DVT prophylaxis orders are signed

## 2024-04-27 NOTE — PROGRESS NOTES
[facility] Cardiology    Inpatient Progress Note      Chief Complaint/Reason for service:    ICD shock         Subjective:       No changes in health since being discharged from the hospital after Tikosyn loading earlier this month.  Yesterday evening was seated at home and noticed palpitations/lightheadedness.  Shortly after, ICD discharged.  Following defibrillation felt fatigued.  Denied lightheadedness, chest pain, dyspnea    Past medical, surgical, social and family history reviewed in the patient's electronic medical record.         Objective:      Infusions:        Medications:    Current Facility-Administered Medications:     acetaminophen (TYLENOL) tablet 650 mg, 650 mg, Oral, Q4H PRN, Jean Paul Boateng MD    albuterol (PROVENTIL) nebulizer solution 0.083% 2.5 mg/3mL, 2.5 mg, Nebulization, Q4H PRN, Jean Paul Boateng MD    sennosides-docusate (PERICOLACE) 8.6-50 MG per tablet 2 tablet, 2 tablet, Oral, BID PRN **AND** polyethylene glycol (MIRALAX) packet 17 g, 17 g, Oral, Daily PRN **AND** bisacodyl (DULCOLAX) EC tablet 5 mg, 5 mg, Oral, Daily PRN **AND** bisacodyl (DULCOLAX) suppository 10 mg, 10 mg, Rectal, Daily PRN, Jean Paul Boateng MD    Calcium Replacement - Follow Nurse / BPA Driven Protocol, , Does not apply, PRN, Jean Paul Boateng MD    dofetilide (TIKOSYN) capsule 500 mcg, 500 mcg, Oral, Q12H, Jean Paul Boateng MD, 500 mcg at 04/27/24 0834    empagliflozin (JARDIANCE) tablet 10 mg, 10 mg, Oral, Daily, Jean Paul Boateng MD, 10 mg at 04/27/24 0834    [Held by provider] furosemide (LASIX) tablet 20 mg, 20 mg, Oral, Daily PRN, Jean Paul Boateng MD    heparin (porcine) 5000 UNIT/ML injection 5,000 Units, 5,000 Units, Subcutaneous, Q8H, Jean Paul Boateng MD, 5,000 Units at 04/27/24 0553    Magnesium Cardiology Dose Replacement - Follow Nurse / BPA Driven Protocol, , Does not apply, PRN, Jean Paul Boateng MD    metoprolol succinate XL (TOPROL-XL) 24 hr tablet 25 mg, 25 mg, Oral, Nightly,  Jean Paul Boateng MD    montelukast (SINGULAIR) tablet 10 mg, 10 mg, Oral, Nightly, Jean Paul Boateng MD    multivitamin with minerals 1 tablet, 1 tablet, Oral, Daily, Jean Paul Boateng MD, 1 tablet at 04/27/24 0834    Phosphorus Replacement - Follow Nurse / BPA Driven Protocol, , Does not apply, PRKilo LOPEZ John L, MD    Potassium Replacement - Follow Nurse / BPA Driven Protocol, , Does not apply, Kilo PERKINS John L, MD    sacubitril-valsartan (ENTRESTO) 24-26 MG tablet 1 tablet, 1 tablet, Oral, Q12H, Jean Paul Boateng MD    sodium chloride 0.9 % flush 10 mL, 10 mL, Intravenous, PRN, Sukhdeep Ventura MD    sodium chloride 0.9 % flush 10 mL, 10 mL, Intravenous, Q12H, Jean Paul Boateng MD, 10 mL at 04/27/24 0834    sodium chloride 0.9 % flush 10 mL, 10 mL, Intravenous, Kilo PERKINS John L, MD    sodium chloride 0.9 % infusion 40 mL, 40 mL, Intravenous, Kilo PERKINS John L, MD    Vital Sign Min/Max for last 24 hours  Temp  Min: 96.5 °F (35.8 °C)  Max: 98.7 °F (37.1 °C)   BP  Min: 94/53  Max: 137/87   Pulse  Min: 71  Max: 106   Resp  Min: 16  Max: 20   SpO2  Min: 93 %  Max: 98 %   No data recorded    No intake or output data in the 24 hours ending 04/27/24 1301        CONSTITUTIONAL: No acute distress  CARDIOVASCULAR: Regular rate and rhythm with normal S1 and S2. There is no significant lower extremity edema bilaterally. Normal radial pulse.     Labs/studies:  Available lab and imaging results were reviewed by myself today    Results for orders placed during the hospital encounter of 10/24/23    Adult Transthoracic Echo Complete W/ Cont if Necessary Per Protocol    Interpretation Summary    Left ventricular systolic function is mildly decreased. Calculated left ventricular EF = 42.9% Abnormal global longitudinal LV strain (GLS) = -14.5%. Left ventricle strain data was reviewed by the physician and found to be accurate. Normal left ventricular wall thickness noted. The left ventricular cavity is  moderately dilated. There is left ventricular global hypokinesis noted. No evidence of a left ventricular thrombus present.    Normal right ventricular cavity size, wall thickness, systolic function and septal motion noted.    The aortic valve is grossly normal in structure. The aortic valve appears trileaflet. No significant aortic valve regurgitation is present. No hemodynamically significant aortic valve stenosis is present.    The mitral valve is structurally normal with no significant stenosis present. Trace mitral valve regurgitation is present.    The tricuspid valve is grossly normal in structure. Physiologic tricuspid valve regurgitation is present. No evidence of significant tricuspid valve stenosis is present.    Cardiac MRI 3/2024  1. Mildly dilated left ventricle with mildly reduced global systolic function (LVEF 42%) and regional akinesis in the basal inferolateral wall. Assessment of LV scar (late gadolinium enhancement) is suboptimal secondary to artifact from cardiac device as well as patient motion; within these confines, no LGE is visualized.     2. Normal sized right ventricle with normal global systolic function (RVEF 50%).     Tele: Sinus rhythm    DEVICE INTERROGATION: [employer], Interrogation date 4/26/24 - RV pacing 0%. Threshold and impedances are acceptable. Battery voltage is 7.5 years.  VF episode 4/26/2024 at 3:23 PM status post ATP x 1 and defib x 1 at 29 J         Assessment/Plan:         V-tach    Nonischemic cardiomyopathy    CHF NYHA class I    IRINA on CPAP    Cardiac defibrillator in place    ICD (implantable cardioverter-defibrillator) discharge    Ventricular tachycardia       ASSESSMENT:  Nonischemic cardiomyopathy  [facility] 2021 normal coronaries  Ventricular tachycardia, VFib  VT ablation 3/27/2024  Recurrent episodes of VT 4/5/2024  V-fib 4/26/2024 as noted in the above device interrogation notes  Obesity  IRINA with consistent CPAP use   Asthma  GERD  History of  vertigo    PLAN:  V. Tach/V. Fib:  [employer] device interrogation reviewed; rhythm reported VF s/p ATP and defib x 1  Will ask [employer] rep if they are able to print out a copy of the event EGM to further evaluate if the rhythm was VF or VT  Continue telemetry monitoring   Continue Tikosyn/Toprol   Adding mexiletine  Potassium replacement per protocol  Monitoring EKGs  Dr. Parra/EP consult on Monday to reassess options.    HF:   Repeat echo  Continue Toprol, Jardiance (on Farxiga at home)  Added holding parameters to Entresto  Monitoring volume status      Rohan Mcfarlane MD, MSc, FACC, [facility]  Interventional Cardiology  [facility]

## 2024-04-27 NOTE — H&P
[facility] Medicine Services  HISTORY AND PHYSICAL    Patient Name: David Ravi  : 1970  MRN: [id number]  Primary Care Physician: Jayant Newton MD  Date of admission: 2024      Subjective   Subjective     Chief Complaint:  Palpitations    HPI:  David Ravi is a 54 y.o. male with past medical history of nonischemic cardiomyopathy secondary to viral illness, chronic systolic heart failure, ventricular tachycardia status post ICD placement with radioablation in March of this year and admission earlier this month for Tikosyn loading.  Since his admission in April he had been doing quite well, reports no overall recent changes in health.  This evening he was sitting at home and noticed palpitations and felt lightheaded, shortly after he felt like his ICD discharged.  Felt normal again afterwards, states he had a similar episode of palpitations while in the emergency department however telemetry and EKG just picked up on PVCs.  He denies current lightheadedness, dizziness, chest pain, shortness of breath, abdominal pain, or lower extremity edema.  Overall heart failure symptoms have been controlled.  He does not smoke, does not drink, is adherent to all medications, avoids caffeine, and uses his CPAP nightly for sleep.      Personal History     Past Medical History:   Diagnosis Date    Arrhythmia     Asthma     CHF NYHA class I     Coronary artery disease     GERD (gastroesophageal reflux disease)     Heart disease     History of vertigo     Hyperlipidemia     Nonischemic cardiomyopathy     Obesity     IRINA on CPAP     Ventricular arrhythmia 2024           Past Surgical History:   Procedure Laterality Date    ABLATION OF DYSRHYTHMIC FOCUS      CARDIAC CATHETERIZATION      CARDIAC CATHETERIZATION Left 10/19/2021    Procedure: Left Heart Cath;  Surgeon: Shankar Sanchez MD;  Location: [facility] INVASIVE LOCATION;  Service: Cardiovascular;   Laterality: Left;    CARDIAC DEFIBRILLATOR PLACEMENT  2015    CARDIAC ELECTROPHYSIOLOGY PROCEDURE N/A 03/27/2024    Procedure: Ablation VT--DNS meds, schedule after cardiac MRI;  Surgeon: Robert Parra MD;  Location: [facility] INVASIVE LOCATION;  Service: Cardiovascular;  Laterality: N/A;    COLONOSCOPY         Family History: family history includes Alcohol abuse in his maternal grandfather and paternal uncle; Alzheimer's disease in his paternal grandmother; Colon cancer in his mother; Heart attack in his father; Heart disease in his father; No Known Problems in his sister.     Social History:  reports that he has never smoked. He has been exposed to tobacco smoke. He has never used smokeless tobacco. He reports that he does not drink alcohol and does not use drugs.  Social History     Social History Narrative    Not on file       Medications:  Available home medication information reviewed.  ALLERGY SERUM INJECTION, Acetaminophen, albuterol sulfate HFA, apixaban, azelastine, dapagliflozin Propanediol, dofetilide, furosemide, levocetirizine, metoprolol succinate XL, montelukast, multivitamin with minerals, nitroglycerin, omeprazole, and sacubitril-valsartan    Allergies   Allergen Reactions    Amoxicillin Anaphylaxis     difficulting breathing       Objective   Objective     Vital Signs:   Temp:  [98.7 °F (37.1 °C)] 98.7 °F (37.1 °C)  Heart Rate:  [] 83  Resp:  [20] 20  BP: (119-137)/(75-87) 129/86       Physical Exam   Awake alert and oriented x 3  Resting comfortably in bed  Nondiaphoretic, nonicteric  Heart regular rate and rhythm  Lungs are clear to auscultation bilaterally  Abdomen is soft and nontender  Trace lower extremity edema  Extremities are warm dry and well-perfused    Result Review:  I have personally reviewed the results from the time of this admission to 4/27/2024 00:22 EDT and agree with these findings:  [x]  Laboratory list / accordion  []  Microbiology  [x]  Radiology  [x]  EKG/Telemetry    [x]  Cardiology/Vascular   []  Pathology  [x]  Old records  []  Other:  Most notable findings include: See assessment and plan      LAB RESULTS:      Lab 04/26/24 1956   WBC 9.29   HEMOGLOBIN 17.1   HEMATOCRIT 52.5*   PLATELETS 201   NEUTROS ABS 6.66   IMMATURE GRANS (ABS) 0.03   LYMPHS ABS 1.81   MONOS ABS 0.69   EOS ABS 0.06   MCV 87.9   D DIMER QUANT <0.27         Lab 04/26/24 1956   SODIUM 143   POTASSIUM 3.4*   CHLORIDE 104   CO2 26.0   ANION GAP 13.0   BUN 7   CREATININE 0.70*   EGFR 110.9   GLUCOSE 98   CALCIUM 9.6   MAGNESIUM 2.0   PHOSPHORUS 2.8   TSH 1.360         Lab 04/26/24 1956   TOTAL PROTEIN 7.1   ALBUMIN 4.4   GLOBULIN 2.7   ALT (SGPT) 26   AST (SGOT) 24   BILIRUBIN 0.6   ALK PHOS 101         Lab 04/26/24 2248 04/26/24 1956   PROBNP  --  296.1   HSTROP T 23* 25*  25*                     Microbiology Results (last 10 days)       ** No results found for the last 240 hours. **            XR Chest 1 View    Result Date: 4/26/2024  XR CHEST 1 VW Date of Exam: 4/26/2024 7:19 PM EDT Indication: Dysrhythmia triage protocol Comparison: 4/5/2024 Findings: There is a left subclavian single lead AICD. Heart size borderline. Pulmonary vasculature within normal limits. Pulse generator obscures the peripheral left midlung. Visualized lungs clear. Costophrenic angle sharp     Impression: Impression: No active cardiopulmonary disease Electronically Signed: Joel Jonas  4/26/2024 7:42 PM EDT  Workstation ID: [id number]     Results for orders placed during the hospital encounter of 10/24/23    Adult Transthoracic Echo Complete W/ Cont if Necessary Per Protocol    Interpretation Summary    Left ventricular systolic function is mildly decreased. Calculated left ventricular EF = 42.9% Abnormal global longitudinal LV strain (GLS) = -14.5%. Left ventricle strain data was reviewed by the physician and found to be accurate. Normal left ventricular wall thickness noted. The left ventricular cavity is moderately  dilated. There is left ventricular global hypokinesis noted. No evidence of a left ventricular thrombus present.    Normal right ventricular cavity size, wall thickness, systolic function and septal motion noted.    The aortic valve is grossly normal in structure. The aortic valve appears trileaflet. No significant aortic valve regurgitation is present. No hemodynamically significant aortic valve stenosis is present.    The mitral valve is structurally normal with no significant stenosis present. Trace mitral valve regurgitation is present.    The tricuspid valve is grossly normal in structure. Physiologic tricuspid valve regurgitation is present. No evidence of significant tricuspid valve stenosis is present.      Assessment & Plan   Assessment & Plan       V-tach    Nonischemic cardiomyopathy    CHF NYHA class I    IRINA on CPAP    Cardiac defibrillator in place    ICD (implantable cardioverter-defibrillator) discharge    Ventricular tachycardia    History of nonischemic cardiomyopathy and compensated chronic systolic heart failure with subsequent V. tach and ICD in place  ICD discharge  -Status post radioablation in March of this year and admission earlier this month for Tikosyn loading.  Has been adherent to all medications and reports no recent changes in health or heart failure symptoms.  Potassium is low but otherwise labs are okay.  Troponin mildly elevated now downtrending.  He does appear to have frequent PVCs on telemetry.  He does report increased urinary frequency since recent dose increase of home Farxiga, but does not appear dehydrated.  ED consulted cardiology who recommends observation tonight and no additional medications at this time  -Awaiting interrogation results of [employer] pacemaker  -Replete potassium  -Repeat EKG in the morning prior to next dose Tikosyn.  Continue metoprolol and Entresto, SGLT2 inhibitor  -Cardiology to evaluate tomorrow    Hypokalemia  -Cardiac replacement protocol  ordered    IRINA  -Continue home CPAP, has been adherent      DVT prophylaxis:  Medical DVT prophylaxis orders are signed and held.            CODE STATUS:    Code Status and Medical Interventions:   Ordered at: 04/27/24 0014     Code Status (Patient has no pulse and is not breathing):    CPR (Attempt to Resuscitate)     Medical Interventions (Patient has pulse or is breathing):    Full Support       Expected Discharge   Expected discharge date/ time has not been documented.     Jean Paul Boateng MD  04/27/24

## 2024-04-28 ENCOUNTER — APPOINTMENT (OUTPATIENT)
Dept: CARDIOLOGY | Facility: HOSPITAL | Age: 54
End: 2024-04-28
Payer: MEDICARE

## 2024-04-28 LAB
ANION GAP SERPL CALCULATED.3IONS-SCNC: 11 MMOL/L (ref 5–15)
BH CV ECHO MEAS - AO MAX PG: 4.1 MMHG
BH CV ECHO MEAS - AO MEAN PG: 2.2 MMHG
BH CV ECHO MEAS - AO ROOT DIAM: 3.3 CM
BH CV ECHO MEAS - AO V2 MAX: 101.6 CM/SEC
BH CV ECHO MEAS - AO V2 VTI: 20.3 CM
BH CV ECHO MEAS - AVA(I,D): 2.19 CM2
BH CV ECHO MEAS - EDV(CUBED): 292 ML
BH CV ECHO MEAS - EDV(MOD-SP2): 191 ML
BH CV ECHO MEAS - EDV(MOD-SP4): 262 ML
BH CV ECHO MEAS - EF(MOD-BP): 44.2 %
BH CV ECHO MEAS - EF(MOD-SP2): 44.5 %
BH CV ECHO MEAS - EF(MOD-SP4): 40.8 %
BH CV ECHO MEAS - ESV(CUBED): 175.6 ML
BH CV ECHO MEAS - ESV(MOD-SP2): 106 ML
BH CV ECHO MEAS - ESV(MOD-SP4): 155 ML
BH CV ECHO MEAS - FS: 15.6 %
BH CV ECHO MEAS - IVS/LVPW: 0.92 CM
BH CV ECHO MEAS - IVSD: 1.2 CM
BH CV ECHO MEAS - LA DIMENSION: 4.6 CM
BH CV ECHO MEAS - LAT PEAK E' VEL: 14.8 CM/SEC
BH CV ECHO MEAS - LV DIASTOLIC VOL/BSA (35-75): 115 CM2
BH CV ECHO MEAS - LV MASS(C)D: 234.4 GRAMS
BH CV ECHO MEAS - LV MAX PG: 1.68 MMHG
BH CV ECHO MEAS - LV MEAN PG: 1 MMHG
BH CV ECHO MEAS - LV SYSTOLIC VOL/BSA (12-30): 68 CM2
BH CV ECHO MEAS - LV V1 MAX: 64.7 CM/SEC
BH CV ECHO MEAS - LV V1 VTI: 14.1 CM
BH CV ECHO MEAS - LVIDD: 6.6 CM
BH CV ECHO MEAS - LVIDS: 5.6 CM
BH CV ECHO MEAS - LVOT AREA: 3.1 CM2
BH CV ECHO MEAS - LVOT DIAM: 2 CM
BH CV ECHO MEAS - LVPWD: 1.2 CM
BH CV ECHO MEAS - MED PEAK E' VEL: 8.3 CM/SEC
BH CV ECHO MEAS - MV A MAX VEL: 58.7 CM/SEC
BH CV ECHO MEAS - MV DEC SLOPE: 417 CM/SEC2
BH CV ECHO MEAS - MV DEC TIME: 0.17 SEC
BH CV ECHO MEAS - MV E MAX VEL: 88 CM/SEC
BH CV ECHO MEAS - MV E/A: 1.5
BH CV ECHO MEAS - MV MAX PG: 3 MMHG
BH CV ECHO MEAS - MV MEAN PG: 1 MMHG
BH CV ECHO MEAS - MV P1/2T: 63.1 MSEC
BH CV ECHO MEAS - MV V2 VTI: 21.8 CM
BH CV ECHO MEAS - MVA(P1/2T): 3.5 CM2
BH CV ECHO MEAS - MVA(VTI): 2.03 CM2
BH CV ECHO MEAS - PA ACC TIME: 0.16 SEC
BH CV ECHO MEAS - PA V2 MAX: 70.6 CM/SEC
BH CV ECHO MEAS - RAP SYSTOLE: 3 MMHG
BH CV ECHO MEAS - RVSP: 13 MMHG
BH CV ECHO MEAS - SV(LVOT): 44.4 ML
BH CV ECHO MEAS - SV(MOD-SP2): 85 ML
BH CV ECHO MEAS - SV(MOD-SP4): 107 ML
BH CV ECHO MEAS - SVI(LVOT): 19.5 ML/M2
BH CV ECHO MEAS - SVI(MOD-SP2): 37.3 ML/M2
BH CV ECHO MEAS - SVI(MOD-SP4): 47 ML/M2
BH CV ECHO MEAS - TAPSE (>1.6): 2.19 CM
BH CV ECHO MEAS - TR MAX PG: 10.1 MMHG
BH CV ECHO MEAS - TR MAX VEL: 159 CM/SEC
BH CV ECHO MEASUREMENTS AVERAGE E/E' RATIO: 7.62
BH CV VAS BP LEFT ARM: NORMAL MMHG
BH CV XLRA - RV BASE: 3.2 CM
BH CV XLRA - RV LENGTH: 8.5 CM
BH CV XLRA - RV MID: 2.1 CM
BH CV XLRA - TDI S': 14.4 CM/SEC
BUN SERPL-MCNC: 9 MG/DL (ref 6–20)
BUN/CREAT SERPL: 11.7 (ref 7–25)
CALCIUM SPEC-SCNC: 9.1 MG/DL (ref 8.6–10.5)
CHLORIDE SERPL-SCNC: 105 MMOL/L (ref 98–107)
CHOLEST SERPL-MCNC: 121 MG/DL (ref 0–200)
CO2 SERPL-SCNC: 27 MMOL/L (ref 22–29)
CREAT SERPL-MCNC: 0.77 MG/DL (ref 0.76–1.27)
EGFRCR SERPLBLD CKD-EPI 2021: 107.7 ML/MIN/1.73
GLUCOSE SERPL-MCNC: 97 MG/DL (ref 65–99)
HDLC SERPL-MCNC: 48 MG/DL (ref 40–60)
LDLC SERPL CALC-MCNC: 54 MG/DL (ref 0–100)
LDLC/HDLC SERPL: 1.11 {RATIO}
LEFT ATRIUM VOLUME INDEX: 29.1 ML/M2
POTASSIUM SERPL-SCNC: 3.5 MMOL/L (ref 3.5–5.2)
POTASSIUM SERPL-SCNC: 4.5 MMOL/L (ref 3.5–5.2)
QT INTERVAL: 418 MS
QT INTERVAL: 448 MS
QTC INTERVAL: 454 MS
QTC INTERVAL: 458 MS
SODIUM SERPL-SCNC: 143 MMOL/L (ref 136–145)
TRIGL SERPL-MCNC: 99 MG/DL (ref 0–150)
VLDLC SERPL-MCNC: 19 MG/DL (ref 5–40)

## 2024-04-28 PROCEDURE — 93306 TTE W/DOPPLER COMPLETE: CPT | Performed by: INTERNAL MEDICINE

## 2024-04-28 PROCEDURE — 99214 OFFICE O/P EST MOD 30 MIN: CPT | Performed by: HOSPITALIST

## 2024-04-28 PROCEDURE — 25010000002 SULFUR HEXAFLUORIDE MICROSPH 60.7-25 MG RECONSTITUTED SUSPENSION: Performed by: INTERNAL MEDICINE

## 2024-04-28 PROCEDURE — 84132 ASSAY OF SERUM POTASSIUM: CPT | Performed by: FAMILY MEDICINE

## 2024-04-28 PROCEDURE — 25010000002 HEPARIN (PORCINE) PER 1000 UNITS: Performed by: STUDENT IN AN ORGANIZED HEALTH CARE EDUCATION/TRAINING PROGRAM

## 2024-04-28 PROCEDURE — 93005 ELECTROCARDIOGRAM TRACING: CPT | Performed by: INTERNAL MEDICINE

## 2024-04-28 PROCEDURE — G0378 HOSPITAL OBSERVATION PER HR: HCPCS

## 2024-04-28 PROCEDURE — 96372 THER/PROPH/DIAG INJ SC/IM: CPT

## 2024-04-28 PROCEDURE — 97161 PT EVAL LOW COMPLEX 20 MIN: CPT

## 2024-04-28 PROCEDURE — 93010 ELECTROCARDIOGRAM REPORT: CPT | Performed by: INTERNAL MEDICINE

## 2024-04-28 PROCEDURE — 99232 SBSQ HOSP IP/OBS MODERATE 35: CPT | Performed by: FAMILY MEDICINE

## 2024-04-28 PROCEDURE — 80061 LIPID PANEL: CPT | Performed by: INTERNAL MEDICINE

## 2024-04-28 PROCEDURE — 93306 TTE W/DOPPLER COMPLETE: CPT

## 2024-04-28 PROCEDURE — 80048 BASIC METABOLIC PNL TOTAL CA: CPT | Performed by: FAMILY MEDICINE

## 2024-04-28 RX ORDER — POTASSIUM CHLORIDE 20 MEQ/1
40 TABLET, EXTENDED RELEASE ORAL EVERY 4 HOURS
Status: COMPLETED | OUTPATIENT
Start: 2024-04-28 | End: 2024-04-28

## 2024-04-28 RX ADMIN — POTASSIUM CHLORIDE 40 MEQ: 1500 TABLET, EXTENDED RELEASE ORAL at 06:16

## 2024-04-28 RX ADMIN — HEPARIN SODIUM 5000 UNITS: 5000 INJECTION INTRAVENOUS; SUBCUTANEOUS at 05:29

## 2024-04-28 RX ADMIN — SULFUR HEXAFLUORIDE 2 ML: KIT at 10:21

## 2024-04-28 RX ADMIN — MONTELUKAST 10 MG: 10 TABLET, FILM COATED ORAL at 20:27

## 2024-04-28 RX ADMIN — Medication 10 ML: at 09:08

## 2024-04-28 RX ADMIN — METOPROLOL SUCCINATE 25 MG: 25 TABLET, EXTENDED RELEASE ORAL at 20:27

## 2024-04-28 RX ADMIN — HEPARIN SODIUM 5000 UNITS: 5000 INJECTION INTRAVENOUS; SUBCUTANEOUS at 14:46

## 2024-04-28 RX ADMIN — DOFETILIDE 500 MCG: 0.5 CAPSULE ORAL at 09:07

## 2024-04-28 RX ADMIN — MEXILETINE HYDROCHLORIDE 150 MG: 150 CAPSULE ORAL at 20:27

## 2024-04-28 RX ADMIN — HEPARIN SODIUM 5000 UNITS: 5000 INJECTION INTRAVENOUS; SUBCUTANEOUS at 20:27

## 2024-04-28 RX ADMIN — MEXILETINE HYDROCHLORIDE 150 MG: 150 CAPSULE ORAL at 05:29

## 2024-04-28 RX ADMIN — SACUBITRIL AND VALSARTAN 1 TABLET: 24; 26 TABLET, FILM COATED ORAL at 09:08

## 2024-04-28 RX ADMIN — POTASSIUM CHLORIDE 40 MEQ: 1500 TABLET, EXTENDED RELEASE ORAL at 11:44

## 2024-04-28 RX ADMIN — Medication 1 TABLET: at 09:07

## 2024-04-28 RX ADMIN — EMPAGLIFLOZIN 10 MG: 10 TABLET, FILM COATED ORAL at 09:07

## 2024-04-28 RX ADMIN — SACUBITRIL AND VALSARTAN 1 TABLET: 24; 26 TABLET, FILM COATED ORAL at 20:27

## 2024-04-28 RX ADMIN — MEXILETINE HYDROCHLORIDE 150 MG: 150 CAPSULE ORAL at 14:47

## 2024-04-28 RX ADMIN — DOFETILIDE 500 MCG: 0.5 CAPSULE ORAL at 20:27

## 2024-04-28 NOTE — PROGRESS NOTES
[facility] Emergency Department Cardiology    Inpatient Progress Note      Chief Complaint/Reason for service:    ICD shock         Subjective:       Patient sitting up in recliner.  Wife at bedside.  Some intermittent shortness of breath overnight.  Denies chest pain.     Past medical, surgical, social and family history reviewed in the patient's electronic medical record.         Objective:      Infusions:        Medications:    Current Facility-Administered Medications:     acetaminophen (TYLENOL) tablet 650 mg, 650 mg, Oral, Q4H PRN, Jean Paul Boateng MD    albuterol (PROVENTIL) nebulizer solution 0.083% 2.5 mg/3mL, 2.5 mg, Nebulization, Q4H PRN, Jean Paul Boateng MD    sennosides-docusate (PERICOLACE) 8.6-50 MG per tablet 2 tablet, 2 tablet, Oral, BID PRN **AND** polyethylene glycol (MIRALAX) packet 17 g, 17 g, Oral, Daily PRN **AND** bisacodyl (DULCOLAX) EC tablet 5 mg, 5 mg, Oral, Daily PRN **AND** bisacodyl (DULCOLAX) suppository 10 mg, 10 mg, Rectal, Daily PRN, Jean Paul Boateng MD    Calcium Replacement - Follow Nurse / BPA Driven Protocol, , Does not apply, PRN, Jean Paul Boateng MD    dofetilide (TIKOSYN) capsule 500 mcg, 500 mcg, Oral, Q12H, Jean Paul Boateng MD, 500 mcg at 04/28/24 0907    empagliflozin (JARDIANCE) tablet 10 mg, 10 mg, Oral, Daily, Jean Paul Boateng MD, 10 mg at 04/28/24 0907    [Held by provider] furosemide (LASIX) tablet 20 mg, 20 mg, Oral, Daily PRN, Jean Paul Boateng MD    heparin (porcine) 5000 UNIT/ML injection 5,000 Units, 5,000 Units, Subcutaneous, Q8H, Jean Paul Boateng MD, 5,000 Units at 04/28/24 0529    Magnesium Cardiology Dose Replacement - Follow Nurse / BPA Driven Protocol, , Does not apply, PRN, Jean Paul Boateng MD    metoprolol succinate XL (TOPROL-XL) 24 hr tablet 25 mg, 25 mg, Oral, Nightly, Jean Paul Boateng MD, 25 mg at 04/27/24 2216    mexiletine (MEXITIL) capsule 150 mg, 150 mg, Oral, Q8H, Rohan Mcfarlane MD, 150 mg at 04/28/24 0509    montelukast (SINGULAIR) tablet  10 mg, 10 mg, Oral, Nightly, Jean Paul Boateng MD, 10 mg at 04/27/24 2216    multivitamin with minerals 1 tablet, 1 tablet, Oral, Daily, Jean Paul Boateng MD, 1 tablet at 04/28/24 0907    Phosphorus Replacement - Follow Nurse / BPA Driven Protocol, , Does not apply, PRKilo LOPEZ John L, MD    potassium chloride (KLOR-CON M20) CR tablet 40 mEq, 40 mEq, Oral, Q4H, Lien Levy MD, 40 mEq at 04/28/24 0616    Potassium Replacement - Follow Nurse / BPA Driven Protocol, , Does not apply, Kilo PERKINS John L, MD    sacubitril-valsartan (ENTRESTO) 24-26 MG tablet 1 tablet, 1 tablet, Oral, Q12H, Rohan Mcfarlane MD, 1 tablet at 04/28/24 0908    sodium chloride 0.9 % flush 10 mL, 10 mL, Intravenous, PRN, Sukhdeep Ventura MD    sodium chloride 0.9 % flush 10 mL, 10 mL, Intravenous, Q12H, Jean Paul Boateng MD, 10 mL at 04/28/24 0908    sodium chloride 0.9 % flush 10 mL, 10 mL, Intravenous, Kilo PERKINS John L, MD    sodium chloride 0.9 % infusion 40 mL, 40 mL, Intravenous, PRN, Jean Paul Boateng MD    Vital Sign Min/Max for last 24 hours  Temp  Min: 97.5 °F (36.4 °C)  Max: 98.3 °F (36.8 °C)   BP  Min: 94/53  Max: 110/76   Pulse  Min: 56  Max: 80   Resp  Min: 16  Max: 20   SpO2  Min: 93 %  Max: 96 %   No data recorded      Intake/Output Summary (Last 24 hours) at 4/28/2024 1047  Last data filed at 4/27/2024 1300  Gross per 24 hour   Intake 360 ml   Output --   Net 360 ml           CONSTITUTIONAL: No acute distress  CARDIOVASCULAR: Regular rate and rhythm with normal S1 and S2. There is no significant lower extremity edema bilaterally. Normal radial pulse.     Labs/studies:  Available lab and imaging results were reviewed by myself today    Results for orders placed during the hospital encounter of 10/24/23    Adult Transthoracic Echo Complete W/ Cont if Necessary Per Protocol    Interpretation Summary    Left ventricular systolic function is mildly decreased. Calculated left ventricular EF = 42.9% Abnormal global  longitudinal LV strain (GLS) = -14.5%. Left ventricle strain data was reviewed by the physician and found to be accurate. Normal left ventricular wall thickness noted. The left ventricular cavity is moderately dilated. There is left ventricular global hypokinesis noted. No evidence of a left ventricular thrombus present.    Normal right ventricular cavity size, wall thickness, systolic function and septal motion noted.    The aortic valve is grossly normal in structure. The aortic valve appears trileaflet. No significant aortic valve regurgitation is present. No hemodynamically significant aortic valve stenosis is present.    The mitral valve is structurally normal with no significant stenosis present. Trace mitral valve regurgitation is present.    The tricuspid valve is grossly normal in structure. Physiologic tricuspid valve regurgitation is present. No evidence of significant tricuspid valve stenosis is present.    Cardiac MRI 3/2024  1. Mildly dilated left ventricle with mildly reduced global systolic function (LVEF 42%) and regional akinesis in the basal inferolateral wall. Assessment of LV scar (late gadolinium enhancement) is suboptimal secondary to artifact from cardiac device as well as patient motion; within these confines, no LGE is visualized.     2. Normal sized right ventricle with normal global systolic function (RVEF 50%).     Tele: Sinus rhythm with frequent PVCs    DEVICE INTERROGATION: [employer], Interrogation date 4/26/24 - RV pacing 0%. Threshold and impedances are acceptable. Battery voltage is 7.5 years.  VF episode 4/26/2024 at 3:23 PM status post ATP x 1 and defib x 1 at 29 J         Assessment/Plan:         V-tach    Nonischemic cardiomyopathy    CHF NYHA class I    IRINA on CPAP    Cardiac defibrillator in place    ICD (implantable cardioverter-defibrillator) discharge    Ventricular tachycardia       ASSESSMENT:  Nonischemic cardiomyopathy  [facility] 2021 normal coronaries  Ventricular  tachycardia, VFib  VT ablation 3/27/2024  Recurrent episodes of VT 4/5/2024  V-fib 4/26/2024 as noted in the above device interrogation notes  Obesity  IRINA with consistent CPAP use   Asthma  GERD  History of vertigo    PLAN:  V. Tach/V. Fib:  [employer] device interrogation reviewed; rhythm reported VF s/p ATP and defib x 1  Will ask [employer] rep if they are able to print out a copy of the event EGM to further evaluate if the rhythm was VF or VT  Continue telemetry monitoring   Continue Tikosyn/Toprol   Continue mexiletine  Potassium replacement per protocol  Monitoring EKGs  Dr. Parra/EP consult on Monday to reassess options.    HF:   Echo this am, final report pending.   Continue Toprol, Jardiance (on Farxiga at home)  Added holding parameters to Entresto  Monitoring volume status      Electronically signed by GAGE Ngo, 04/28/24, 10:49 AM EDT.

## 2024-04-28 NOTE — THERAPY DISCHARGE NOTE
Patient Name: David Ravi  : 1970    MRN: [id number]                              Today's Date: 2024       Admit Date: 2024    Visit Dx:     ICD-10-CM ICD-9-CM   1. ICD (implantable cardioverter-defibrillator) discharge  Z45.02 V71.89   2. Ventricular arrhythmia  I49.9 427.9   3. Cardiac defibrillator in place  Z95.810 V45.02   4. Postural dizziness with presyncope  R42 780.4    R55 780.2   5. Palpitations  R00.2 785.1     Patient Active Problem List   Diagnosis    Nonischemic cardiomyopathy    CHF NYHA class I    Obesity    IRINA on CPAP    Asthma    GERD (gastroesophageal reflux disease)    Memory loss    Ventricular fibrillation    Palpitations    Ventricular arrhythmia    Cardiac defibrillator in place    ICD (implantable cardioverter-defibrillator) discharge    Ventricular tachycardia    V-tach     Past Medical History:   Diagnosis Date    Arrhythmia     Asthma     CHF NYHA class I     Coronary artery disease     GERD (gastroesophageal reflux disease)     Heart disease     History of vertigo     Hyperlipidemia     Nonischemic cardiomyopathy     Obesity     IRINA on CPAP     Ventricular arrhythmia 2024     Past Surgical History:   Procedure Laterality Date    ABLATION OF DYSRHYTHMIC FOCUS      CARDIAC CATHETERIZATION      CARDIAC CATHETERIZATION Left 10/19/2021    Procedure: Left Heart Cath;  Surgeon: Shankar Sanchez MD;  Location:  [facility];  Service: Cardiovascular;  Laterality: Left;    CARDIAC DEFIBRILLATOR PLACEMENT      CARDIAC ELECTROPHYSIOLOGY PROCEDURE N/A 2024    Procedure: Ablation VT--DNS meds, schedule after cardiac MRI;  Surgeon: Robert Parra MD;  Location:  [facility];  Service: Cardiovascular;  Laterality: N/A;    COLONOSCOPY        General Information       Row Name 24 0934          Physical Therapy Time and Intention    Document Type discharge evaluation/summary  -ML     Mode of Treatment physical therapy   -ML       Row Name 04/28/24 0934          General Information    Prior Level of Function independent:;all household mobility;community mobility;gait;transfer;bed mobility;ADL's  -ML     Existing Precautions/Restrictions no known precautions/restrictions  -ML     Barriers to Rehab medically complex  -ML       Row Name 04/28/24 0934          Living Environment    People in Home spouse  -ML       Row Name 04/28/24 0934          Home Main Entrance    Number of Stairs, Main Entrance none  -ML       Row Name 04/28/24 0934          Stairs Within Home, Primary    Number of Stairs, Within Home, Primary none  -ML       Row Name 04/28/24 0934          Cognition    Orientation Status (Cognition) oriented x 4  -ML               User Key  (r) = Recorded By, (t) = Taken By, (c) = Cosigned By      Initials Name Provider Type    Melany Weir Physical Therapist                   Mobility       Row Name 04/28/24 0935          Bed Mobility    Bed Mobility supine-sit  -ML     Supine-Sit [place] (Bed Mobility) independent  -ML       Row Name 04/28/24 0935          Sit-Stand Transfer    Sit-Stand [place] (Transfers) independent  -ML       Row Name 04/28/24 0935          Gait/Stairs (Locomotion)    [place] Level (Gait) independent  -ML     Distance in Feet (Gait) 425  -ML     Comment, (Gait/Stairs) Patient ambulates with step through gait pattern, no loss of balance and normalized gait speed. Patient without complaints of dyspnea during ambulaiton.  -ML               User Key  (r) = Recorded By, (t) = Taken By, (c) = Cosigned By      Initials Name Provider Type    Melany Weir Physical Therapist                   Obj/Interventions       Row Name 04/28/24 0936          Range of Motion Comprehensive    General Range of Motion bilateral lower extremity ROM WFL  -ML       [facility] Name 04/28/24 0936          Strength Comprehensive (MMT)    General Manual Muscle Testing (MMT) Assessment no strength deficits identified  -ML        Row Name 04/28/24 36          Balance    Balance Assessment sitting static balance;sitting dynamic balance;sit to stand dynamic balance;standing static balance;standing dynamic balance  -ML     Static Sitting Balance independent  -ML     Dynamic Sitting Balance independent  -ML     Position, Sitting Balance unsupported;sitting edge of bed  -ML     Sit to Stand Dynamic Balance independent  -ML     Static Standing Balance independent  -ML     Dynamic Standing Balance independent  -ML     Position/Device Used, Standing Balance unsupported  -ML     Balance Interventions sitting;standing;sit to stand;occupation based/functional task  -ML       Row Name 04/28/24 09          Sensory Assessment (Somatosensory)    Sensory Assessment (Somatosensory) LE sensation intact  -ML               User Key  (r) = Recorded By, (t) = Taken By, (c) = Cosigned By      Initials Name Provider Type    ML Melany Mock Physical Therapist                   Goals/Plan    No documentation.                  Clinical Impression       Row Name 04/28/24 0936          Pain    Pretreatment Pain Rating 0/10 - no pain  -ML     Posttreatment Pain Rating 0/10 - no pain  -ML       Row Name 04/28/24 [facility]          Plan of Care Review    Plan of Care Reviewed With patient;spouse  -     Outcome Evaluation Physical therapy evaluation complete. The patient independent with transfers and ambulation. Patient presents below baseline for mobility, no acute PT needs identified.  -ML       Row Name 04/28/24 0936          Therapy Assessment/Plan (PT)    Criteria for Skilled Interventions Met (PT) no;no problems identified which require skilled intervention;does not meet criteria for skilled intervention  -     Therapy Frequency (PT) evaluation only  -ML       Row Name 04/28/24 0936          Vital Signs    Pre Systolic BP Rehab 92  -ML     Pre Treatment Diastolic BP 72  -ML     Pretreatment Heart Rate (beats/min) 81  -ML     Pre Patient Position Supine  -ML      Intra Patient Position Standing  -ML     Post Patient Position Sitting  -ML       Row Name 04/28/24 0936          Positioning and Restraints    Pre-Treatment Position in bed  -ML     Post Treatment Position chair  -ML     In Chair notified nsg;sitting;call light within reach;encouraged to call for assist;with family/caregiver  -ML               User Key  (r) = Recorded By, (t) = Taken By, (c) = Cosigned By      Initials Name Provider Type    Melany Weir Physical Therapist                   Outcome Measures       Row Name 04/28/24 0938 04/27/24 2219       How much help from another person do you currently need...    Turning from your back to your side while in flat bed without using bedrails? 4  -ML 4  -MB    Moving from lying on back to sitting on the side of a flat bed without bedrails? 4  -ML 4  -MB    Moving to and from a bed to a chair (including a wheelchair)? 4  -ML 4  -MB    Standing up from a chair using your arms (e.g., wheelchair, bedside chair)? 4  -ML 4  -MB    Climbing 3-5 steps with a railing? 4  -ML 4  -MB    To walk in hospital room? 4  -ML 4  -MB    AM-PAC 6 Clicks Score (PT) 24  -ML 24  -MB    Highest Level of Mobility Goal 8 --> Walked 250 feet or more  -ML 8 --> Walked 250 feet or more  -MB      Row Name 04/28/24 0938          Functional Assessment    Outcome Measure Options AM-PAC 6 Clicks Basic Mobility (PT)  -ML               User Key  (r) = Recorded By, (t) = Taken By, (c) = Cosigned By      Initials Name Provider Type    Lakisha Bob, RN Registered Nurse    Melany Weir Physical Therapist                  Physical Therapy Education       Title: PT OT SLP Therapies (Done)       Topic: Physical Therapy (Done)       Point: Mobility training (Done)       Learning Progress Summary             Patient Acceptance, E, VU by OSBALDO at 4/28/2024 0938   Family Acceptance, E, VU by OSBALDO at 4/28/2024 0938                         Point: Precautions (Done)       Learning Progress Summary              Patient Acceptance, E, VU by  at 4/28/2024 0938   Family Acceptance, E, VU by  at 4/28/2024 0938                                         User Key       Initials Effective Dates Name Provider Type Discipline     04/22/21 -  Melany Mock Physical Therapist PT                  PT Recommendation and Plan     Plan of Care Reviewed With: patient, spouse  Outcome Evaluation: Physical therapy evaluation complete. The patient independent with transfers and ambulation. Patient presents below baseline for mobility, no acute PT needs identified.     Time Calculation:   PT Evaluation Complexity  History, PT Evaluation Complexity: 1-2 personal factors and/or comorbidities  Examination of Body Systems (PT Eval Complexity): 1-2 elements  Clinical Presentation (PT Evaluation Complexity): evolving  Clinical Decision Making (PT Evaluation Complexity): low complexity  Overall Complexity (PT Evaluation Complexity): low complexity     PT Charges       Row Name 04/28/24 0939             Time Calculation    Start Time 0825  -ML      PT Received On 04/28/24  -ML         Untimed Charges    PT Eval/Re-eval Minutes 31  -ML         Total Minutes    Untimed Charges Total Minutes 31  -ML       Total Minutes 31  -ML                User Key  (r) = Recorded By, (t) = Taken By, (c) = Cosigned By      Initials Name Provider Type     Melany Mock Physical Therapist                  Therapy Charges for Today       Code Description Service Date Service Provider Modifiers Qty    81886646216 HC PT EVAL LOW COMPLEXITY 3 4/28/2024 Melany Mock GP 1            PT G-Codes  Outcome Measure Options: AM-PAC 6 Clicks Basic Mobility (PT)  AM-PAC 6 Clicks Score (PT): 24    PT Discharge Summary  Anticipated Discharge Disposition (PT): home  Reason for Discharge: Independent, At baseline function    Melany Mock  4/28/2024

## 2024-04-28 NOTE — PLAN OF CARE
Goal Outcome Evaluation:  Plan of Care Reviewed With: patient, spouse           Outcome Evaluation: Physical therapy evaluation complete. The patient independent with transfers and ambulation. Patient presents below baseline for mobility, no acute PT needs identified.      Anticipated Discharge Disposition (PT): home

## 2024-04-28 NOTE — PLAN OF CARE
Goal Outcome Evaluation:  Plan of Care Reviewed With: patient        Progress: no change  Outcome Evaluation: Pt is A&O with VSS, NSR on the monitor, and on RA except the CPAP when asleep. His potassium this morning was 3.5 so I ordered the replacement. There are no other complaints at this time.

## 2024-04-28 NOTE — PLAN OF CARE
Goal Outcome Evaluation:  Plan of Care Reviewed With: patient        Progress: no change  Patient vital signs stable and on room air. Patient denies pain, nausea, and shortness of air. Patient up ad sabi. No problems identified at this time. Fall and safety precautions maintained.

## 2024-04-28 NOTE — PROGRESS NOTES
[facility] Medicine Services  PROGRESS NOTE    Patient Name: David Ravi  : 1970  MRN: [id number]    Date of Admission: 2024  Primary Care Physician: Jayant Newton MD    Subjective   Subjective     CC:  ICD discharge     HPI:  Denies further ICD discharge. Denies CP, SOA, N/V/F/C      Objective   Objective     Vital Signs:   Temp:  [97.5 °F (36.4 °C)-98.3 °F (36.8 °C)] 97.8 °F (36.6 °C)  Heart Rate:  [56-80] 56  Resp:  [16-20] 16  BP: ()/(53-74) 98/66     Physical Exam:  Constitutional: No acute distress, awake, alert  HENT: NCAT, mucous membranes moist  Respiratory: Clear to auscultation bilaterally, respiratory effort normal   Cardiovascular: RRR, no murmurs, rubs, or gallops  Gastrointestinal: Positive bowel sounds, soft, nontender, nondistended  Musculoskeletal: No bilateral ankle edema  Psychiatric: Appropriate affect, cooperative  Neurologic: Oriented x 3, speech clear  Skin: No rashes    Results Reviewed:  LAB RESULTS:      Lab 24   WBC 9.29   HEMOGLOBIN 17.1   HEMATOCRIT 52.5*   PLATELETS 201   NEUTROS ABS 6.66   IMMATURE GRANS (ABS) 0.03   LYMPHS ABS 1.81   MONOS ABS 0.69   EOS ABS 0.06   MCV 87.9   D DIMER QUANT <0.27         Lab 24  0415 24  1553 24  0528 24  0527 24   SODIUM 143  --   --  142 143   POTASSIUM 3.5 4.3  --  3.4* 3.4*   CHLORIDE 105  --   --  105 104   CO2 27.0  --   --  27.0 26.0   ANION GAP 11.0  --   --  10.0 13.0   BUN 9  --   --  6 7   CREATININE 0.77  --   --  0.70* 0.70*   EGFR 107.7  --   --  110.9 110.9   GLUCOSE 97  --   --  91 98   CALCIUM 9.1  --   --  8.8 9.6   MAGNESIUM  --   --   --  2.5 2.0   PHOSPHORUS  --   --   --   --  2.8   HEMOGLOBIN A1C  --   --  5.30  --   --    TSH  --   --   --   --  1.360         Lab 24   TOTAL PROTEIN 7.1   ALBUMIN 4.4   GLOBULIN 2.7   ALT (SGPT) 26   AST (SGOT) 24   BILIRUBIN 0.6   ALK PHOS 101         Lab 24  1175  "04/26/24 1956   PROBNP  --  296.1   HSTROP T 23* 25*  25*         Lab 04/28/24  0415   CHOLESTEROL 121   LDL CHOL 54   HDL CHOL 48   TRIGLYCERIDES 99             Brief Urine Lab Results       None            Cultures:  No results found for: \"BLOODCX\", \"URINECX\", \"WOUNDCX\", \"MRSACX\", \"RESPCX\", \"STOOLCX\"    Microbiology Results Abnormal       None            XR Chest 1 View    Result Date: 4/26/2024  XR CHEST 1 VW Date of Exam: 4/26/2024 7:19 PM EDT Indication: Dysrhythmia triage protocol Comparison: 4/5/2024 Findings: There is a left subclavian single lead AICD. Heart size borderline. Pulmonary vasculature within normal limits. Pulse generator obscures the peripheral left midlung. Visualized lungs clear. Costophrenic angle sharp     Impression: Impression: No active cardiopulmonary disease Electronically Signed: Joel Jonas  4/26/2024 7:42 PM EDT  Workstation ID: [id number]     Results for orders placed during the hospital encounter of 10/24/23    Adult Transthoracic Echo Complete W/ Cont if Necessary Per Protocol    Interpretation Summary    Left ventricular systolic function is mildly decreased. Calculated left ventricular EF = 42.9% Abnormal global longitudinal LV strain (GLS) = -14.5%. Left ventricle strain data was reviewed by the physician and found to be accurate. Normal left ventricular wall thickness noted. The left ventricular cavity is moderately dilated. There is left ventricular global hypokinesis noted. No evidence of a left ventricular thrombus present.    Normal right ventricular cavity size, wall thickness, systolic function and septal motion noted.    The aortic valve is grossly normal in structure. The aortic valve appears trileaflet. No significant aortic valve regurgitation is present. No hemodynamically significant aortic valve stenosis is present.    The mitral valve is structurally normal with no significant stenosis present. Trace mitral valve regurgitation is present.    The tricuspid " valve is grossly normal in structure. Physiologic tricuspid valve regurgitation is present. No evidence of significant tricuspid valve stenosis is present.      Current medications:  Scheduled Meds:dofetilide, 500 mcg, Oral, Q12H  empagliflozin, 10 mg, Oral, Daily  heparin (porcine), 5,000 Units, Subcutaneous, Q8H  metoprolol succinate XL, 25 mg, Oral, Nightly  mexiletine, 150 mg, Oral, Q8H  montelukast, 10 mg, Oral, Nightly  multivitamin with minerals, 1 tablet, Oral, Daily  potassium chloride ER, 40 mEq, Oral, Q4H  sacubitril-valsartan, 1 tablet, Oral, Q12H  sodium chloride, 10 mL, Intravenous, Q12H      Continuous Infusions:   PRN Meds:.  acetaminophen    albuterol    senna-docusate sodium **AND** polyethylene glycol **AND** bisacodyl **AND** bisacodyl    Calcium Replacement - Follow Nurse / BPA Driven Protocol    [Held by provider] furosemide    Magnesium Cardiology Dose Replacement - Follow Nurse / BPA Driven Protocol    Phosphorus Replacement - Follow Nurse / BPA Driven Protocol    Potassium Replacement - Follow Nurse / BPA Driven Protocol    sodium chloride    sodium chloride    sodium chloride    Assessment & Plan   Assessment & Plan     Active Hospital Problems    Diagnosis  POA    **V-tach [I47.20]  Yes    Ventricular tachycardia [I47.20]  Yes    ICD (implantable cardioverter-defibrillator) discharge [Z45.02]  Not Applicable    Cardiac defibrillator in place [Z95.810]  Yes    Nonischemic cardiomyopathy [I42.8]  Yes    CHF NYHA class I [I50.9]  Yes    IRINA on CPAP [G47.33]  Yes      Resolved Hospital Problems   No resolved problems to display.        Brief Hospital Course to date:    History of nonischemic cardiomyopathy and compensated chronic systolic heart failure with subsequent V. tach and ICD in place  ICD discharge  -Status post radioablation in March of this year and admission earlier this month for Tikosyn loading.  Has been adherent to all medications and reports no recent changes in health or  heart failure symptoms.  Potassium is low but otherwise labs are okay.  Troponin mildly elevated now downtrending.  He does appear to have frequent PVCs on telemetry.  He does report increased urinary frequency since recent dose increase of home Farxiga, but does not appear dehydrated.  ED consulted cardiology who recommends observation tonight and no additional medications at this time  -Awaiting interrogation results of [employer] pacemaker  -Replete potassium  -Repeat EKG in the morning prior to next dose Tikosyn.  Continue metoprolol and Entresto (hold parameters), SGLT2 inhibitor  -Cardiology to evaluate and appreciate recommendations  -Echo ordered and pending  -Cardiology added mexiletine 04/27     Hypokalemia  -Cardiac replacement protocol ordered     IRINA  -Continue home CPAP, has been adherent       Expected Discharge Location and Transportation: TBD  Expected Discharge TBD  Expected discharge date/ time has not been documented.     DVT prophylaxis:  Medical DVT prophylaxis orders are present.         AM-PAC 6 Clicks Score (PT): 24 (04/27/24 5209)    CODE STATUS:   Code Status and Medical Interventions:   Ordered at: 04/27/24 0014     Code Status (Patient has no pulse and is not breathing):    CPR (Attempt to Resuscitate)     Medical Interventions (Patient has pulse or is breathing):    Full Support       Lien Levy MD  04/28/24

## 2024-04-29 LAB
ANION GAP SERPL CALCULATED.3IONS-SCNC: 11 MMOL/L (ref 5–15)
BUN SERPL-MCNC: 8 MG/DL (ref 6–20)
BUN/CREAT SERPL: 9.4 (ref 7–25)
CALCIUM SPEC-SCNC: 9.5 MG/DL (ref 8.6–10.5)
CHLORIDE SERPL-SCNC: 102 MMOL/L (ref 98–107)
CO2 SERPL-SCNC: 27 MMOL/L (ref 22–29)
CREAT SERPL-MCNC: 0.85 MG/DL (ref 0.76–1.27)
EGFRCR SERPLBLD CKD-EPI 2021: 104.6 ML/MIN/1.73
GLUCOSE SERPL-MCNC: 94 MG/DL (ref 65–99)
POTASSIUM SERPL-SCNC: 4.1 MMOL/L (ref 3.5–5.2)
SODIUM SERPL-SCNC: 140 MMOL/L (ref 136–145)

## 2024-04-29 PROCEDURE — 25010000002 HEPARIN (PORCINE) PER 1000 UNITS: Performed by: STUDENT IN AN ORGANIZED HEALTH CARE EDUCATION/TRAINING PROGRAM

## 2024-04-29 PROCEDURE — 96372 THER/PROPH/DIAG INJ SC/IM: CPT

## 2024-04-29 PROCEDURE — 93005 ELECTROCARDIOGRAM TRACING: CPT | Performed by: INTERNAL MEDICINE

## 2024-04-29 PROCEDURE — 93010 ELECTROCARDIOGRAM REPORT: CPT | Performed by: INTERNAL MEDICINE

## 2024-04-29 PROCEDURE — 80048 BASIC METABOLIC PNL TOTAL CA: CPT | Performed by: FAMILY MEDICINE

## 2024-04-29 PROCEDURE — 99232 SBSQ HOSP IP/OBS MODERATE 35: CPT | Performed by: FAMILY MEDICINE

## 2024-04-29 PROCEDURE — G0378 HOSPITAL OBSERVATION PER HR: HCPCS

## 2024-04-29 RX ADMIN — SACUBITRIL AND VALSARTAN 1 TABLET: 24; 26 TABLET, FILM COATED ORAL at 21:45

## 2024-04-29 RX ADMIN — MEXILETINE HYDROCHLORIDE 150 MG: 150 CAPSULE ORAL at 14:07

## 2024-04-29 RX ADMIN — DOFETILIDE 500 MCG: 0.5 CAPSULE ORAL at 08:20

## 2024-04-29 RX ADMIN — MEXILETINE HYDROCHLORIDE 150 MG: 150 CAPSULE ORAL at 05:59

## 2024-04-29 RX ADMIN — HEPARIN SODIUM 5000 UNITS: 5000 INJECTION INTRAVENOUS; SUBCUTANEOUS at 21:46

## 2024-04-29 RX ADMIN — EMPAGLIFLOZIN 10 MG: 10 TABLET, FILM COATED ORAL at 08:18

## 2024-04-29 RX ADMIN — METOPROLOL SUCCINATE 25 MG: 25 TABLET, EXTENDED RELEASE ORAL at 21:46

## 2024-04-29 RX ADMIN — HEPARIN SODIUM 5000 UNITS: 5000 INJECTION INTRAVENOUS; SUBCUTANEOUS at 14:06

## 2024-04-29 RX ADMIN — Medication 10 ML: at 08:22

## 2024-04-29 RX ADMIN — Medication 1 TABLET: at 08:18

## 2024-04-29 RX ADMIN — DOFETILIDE 500 MCG: 0.5 CAPSULE ORAL at 21:45

## 2024-04-29 RX ADMIN — MONTELUKAST 10 MG: 10 TABLET, FILM COATED ORAL at 21:45

## 2024-04-29 RX ADMIN — HEPARIN SODIUM 5000 UNITS: 5000 INJECTION INTRAVENOUS; SUBCUTANEOUS at 06:04

## 2024-04-29 RX ADMIN — MEXILETINE HYDROCHLORIDE 150 MG: 150 CAPSULE ORAL at 21:46

## 2024-04-29 NOTE — PROGRESS NOTES
[facility] Medicine Services  PROGRESS NOTE    Patient Name: David Ravi  : 1970  MRN: [id number]    Date of Admission: 2024  Primary Care Physician: Jayant Newton MD    Subjective   Subjective     CC:  ICD discharge     HPI:  Denies further ICD discharge. Denies CP, SOA, N/V/F/C.  Awaiting final recommendations from EP.      Objective   Objective     Vital Signs:   Temp:  [97.7 °F (36.5 °C)-98.2 °F (36.8 °C)] 98 °F (36.7 °C)  Heart Rate:  [61-89] 69  Resp:  [16-20] 20  BP: (102-110)/(65-76) 108/65     Physical Exam:  Constitutional: No acute distress, awake, alert  HENT: NCAT, mucous membranes moist  Respiratory: Clear to auscultation bilaterally, respiratory effort normal   Cardiovascular: RRR, no murmurs, rubs, or gallops  Gastrointestinal: Positive bowel sounds, soft, nontender, nondistended  Musculoskeletal: No bilateral ankle edema  Psychiatric: Appropriate affect, cooperative  Neurologic: Oriented x 3, speech clear  Skin: No rashes    Results Reviewed:  LAB RESULTS:      Lab 24   WBC 9.29   HEMOGLOBIN 17.1   HEMATOCRIT 52.5*   PLATELETS 201   NEUTROS ABS 6.66   IMMATURE GRANS (ABS) 0.03   LYMPHS ABS 1.81   MONOS ABS 0.69   EOS ABS 0.06   MCV 87.9   D DIMER QUANT <0.27         Lab 24  1547 24  0415 24  1553 24  0528 24  0527 24   SODIUM  --  143  --   --  142 143   POTASSIUM 4.5 3.5 4.3  --  3.4* 3.4*   CHLORIDE  --  105  --   --  105 104   CO2  --  27.0  --   --  27.0 26.0   ANION GAP  --  11.0  --   --  10.0 13.0   BUN  --  9  --   --  6 7   CREATININE  --  0.77  --   --  0.70* 0.70*   EGFR  --  107.7  --   --  110.9 110.9   GLUCOSE  --  97  --   --  91 98   CALCIUM  --  9.1  --   --  8.8 9.6   MAGNESIUM  --   --   --   --  2.5 2.0   PHOSPHORUS  --   --   --   --   --  2.8   HEMOGLOBIN A1C  --   --   --  5.30  --   --    TSH  --   --   --   --   --  1.360         Lab 24   TOTAL PROTEIN 7.1  "  ALBUMIN 4.4   GLOBULIN 2.7   ALT (SGPT) 26   AST (SGOT) 24   BILIRUBIN 0.6   ALK PHOS 101         Lab 04/26/24  2248 04/26/24 1956   PROBNP  --  296.1   HSTROP T 23* 25*  25*         Lab 04/28/24  0415   CHOLESTEROL 121   LDL CHOL 54   HDL CHOL 48   TRIGLYCERIDES 99             Brief Urine Lab Results       None            Cultures:  No results found for: \"BLOODCX\", \"URINECX\", \"WOUNDCX\", \"MRSACX\", \"RESPCX\", \"STOOLCX\"    Microbiology Results Abnormal       None            Adult Transthoracic Echo Complete w/ Color, Spectral and Contrast if Necessary Per Protocol    Result Date: 4/28/2024    Left ventricular systolic function is severely decreased.  Estimated left ventricular ejection fraction appears to be 26 - 30% with Lumason contrast.   The left ventricular cavity is moderately dilated.   Left ventricular wall thickness is consistent with mild concentric hypertrophy.   The right atrial cavity is mild to moderately dilated.   Mild to moderate mitral valve regurgitation is present.   When compared to the echocardiogram performed in October 2023, there is no significant change.  The calculated EF on both ultrasounds are approximately the same, but visually, the ejection fraction is estimated to be notably lower.      Results for orders placed during the hospital encounter of 04/26/24    Adult Transthoracic Echo Complete w/ Color, Spectral and Contrast if Necessary Per Protocol    Interpretation Summary    Left ventricular systolic function is severely decreased.  Estimated left ventricular ejection fraction appears to be 26 - 30% with Lumason contrast.    The left ventricular cavity is moderately dilated.    Left ventricular wall thickness is consistent with mild concentric hypertrophy.    The right atrial cavity is mild to moderately dilated.    Mild to moderate mitral valve regurgitation is present.    When compared to the echocardiogram performed in October 2023, there is no significant change.  The calculated " EF on both ultrasounds are approximately the same, but visually, the ejection fraction is estimated to be notably lower.      Current medications:  Scheduled Meds:dofetilide, 500 mcg, Oral, Q12H  empagliflozin, 10 mg, Oral, Daily  heparin (porcine), 5,000 Units, Subcutaneous, Q8H  metoprolol succinate XL, 25 mg, Oral, Nightly  mexiletine, 150 mg, Oral, Q8H  montelukast, 10 mg, Oral, Nightly  multivitamin with minerals, 1 tablet, Oral, Daily  sacubitril-valsartan, 1 tablet, Oral, Q12H  sodium chloride, 10 mL, Intravenous, Q12H      Continuous Infusions:   PRN Meds:.  acetaminophen    albuterol    senna-docusate sodium **AND** polyethylene glycol **AND** bisacodyl **AND** bisacodyl    Calcium Replacement - Follow Nurse / BPA Driven Protocol    [Held by provider] furosemide    Magnesium Cardiology Dose Replacement - Follow Nurse / BPA Driven Protocol    Phosphorus Replacement - Follow Nurse / BPA Driven Protocol    Potassium Replacement - Follow Nurse / BPA Driven Protocol    sodium chloride    sodium chloride    sodium chloride    Assessment & Plan   Assessment & Plan     Active Hospital Problems    Diagnosis  POA    **V-tach [I47.20]  Yes    Ventricular tachycardia [I47.20]  Yes    ICD (implantable cardioverter-defibrillator) discharge [Z45.02]  Not Applicable    Cardiac defibrillator in place [Z95.810]  Yes    Nonischemic cardiomyopathy [I42.8]  Yes    CHF NYHA class I [I50.9]  Yes    IRINA on CPAP [G47.33]  Yes      Resolved Hospital Problems   No resolved problems to display.        Brief Hospital Course to date:    History of nonischemic cardiomyopathy and compensated chronic systolic heart failure with subsequent V. tach and ICD in place  ICD discharge  -Status post radioablation in March of this year and admission earlier this month for Tikosyn loading.  Has been adherent to all medications and reports no recent changes in health or heart failure symptoms.  Potassium is low but otherwise labs are okay.  Troponin  mildly elevated now downtrending.  He does appear to have frequent PVCs on telemetry.  He does report increased urinary frequency since recent dose increase of home Farxiga, but does not appear dehydrated.  ED consulted cardiology who recommends observation tonight and no additional medications at this time  -Awaiting interrogation results of [employer] pacemaker  -Replete potassium  -Repeat EKG in the morning prior to next dose Tikosyn.  Continue metoprolol and Entresto (hold parameters), SGLT2 inhibitor  -Cardiology to evaluate and appreciate recommendations  -Echo ordered and pending  -Cardiology added mexiletine 04/27     Hypokalemia  -Cardiac replacement protocol ordered     IRINA  -Continue home CPAP, has been adherent       Expected Discharge Location and Transportation: TBD  Expected Discharge TBD  Expected discharge date/ time has not been documented.     DVT prophylaxis:  Medical DVT prophylaxis orders are present.         AM-PAC 6 Clicks Score (PT): 24 (04/28/24 0938)    CODE STATUS:   Code Status and Medical Interventions:   Ordered at: 04/27/24 0014     Code Status (Patient has no pulse and is not breathing):    CPR (Attempt to Resuscitate)     Medical Interventions (Patient has pulse or is breathing):    Full Support       Lien Levy MD  04/29/24

## 2024-04-29 NOTE — PROGRESS NOTES
"  [facility]  CARDIAC EP PROGRESS NOTE    Date of Admission: 4/26/2024  Date of Service: 04/29/24    Primary Care Physician: Jayant Newton MD    Chief Complaint: VF, ICD shock      Subjective      HPI: Patient has histroy of VT and NICM s/p single chamber ICD. He underwent VT ablation in March and had recurrent VT in early Arpil. He was initiated on Tikosyn at that time. Patient was doing some light gardening over the weekend when he felt dizzy with palpitations, then sat down on the ground before being shocked. Device interrogation showed VF with appropriate shock.       Objective   Vitals: BP 95/65   Pulse 75   Temp 98 °F (36.7 °C) (Oral)   Resp 18   Ht 180.3 cm (70.98\")   Wt 109 kg (240 lb)   SpO2 96%   BMI 33.49 kg/m²     Physical Exam:   GENERAL: Alert, cooperative, in no acute distress.   HEENT: Normocephalic, no jugular venous distention  HEART: Regular rate and rhythm; no murmurs, rubs or gallops  LUNGS: Clear to auscultation bilaterally. No wheezing, rales or rhonchi. Nonlabored breathing  ABDOMEN: Soft, nontender   NEUROLOGIC: No gross focal abnormalities  EXTREMITIES: No obvious deformities, cyanosis, or edema noted.   PSYCH: normal mood, behavior    Results:  Results from last 7 days   Lab Units 04/26/24 1956   WBC 10*3/mm3 9.29   HEMOGLOBIN g/dL 17.1   HEMATOCRIT % 52.5*   PLATELETS 10*3/mm3 201     Results from last 7 days   Lab Units 04/28/24  1547 04/28/24  0415 04/27/24  1553 04/27/24  0527 04/26/24 1956   SODIUM mmol/L  --  143  --  142 143   POTASSIUM mmol/L 4.5 3.5 4.3 3.4* 3.4*   CHLORIDE mmol/L  --  105  --  105 104   CO2 mmol/L  --  27.0  --  27.0 26.0   BUN mg/dL  --  9  --  6 7   CREATININE mg/dL  --  0.77  --  0.70* 0.70*   GLUCOSE mg/dL  --  97  --  91 98      Lab Results   Component Value Date    CHOL 121 04/28/2024    TRIG 99 04/28/2024    HDL 48 04/28/2024    LDL 54 04/28/2024    AST 24 04/26/2024    ALT 26 04/26/2024     Results from " last 7 days   Lab Units 04/27/24  0528   HEMOGLOBIN A1C % 5.30     Results from last 7 days   Lab Units 04/28/24  0415   CHOLESTEROL mg/dL 121   TRIGLYCERIDES mg/dL 99   HDL CHOL mg/dL 48   LDL CHOL mg/dL 54     Results from last 7 days   Lab Units 04/26/24 1956   TSH uIU/mL 1.360   FREE T4 ng/dL 1.07             Results from last 7 days   Lab Units 04/26/24  2248 04/26/24 1956   HSTROP T ng/L 23* 25*  25*     Results from last 7 days   Lab Units 04/26/24 1956   PROBNP pg/mL 296.1         Intake/Output Summary (Last 24 hours) at 4/29/2024 0837  Last data filed at 4/29/2024 0800  Gross per 24 hour   Intake 360 ml   Output --   Net 360 ml       I personally reviewed the patient's EKG/Telemetry data    Radiology Data:   XR Chest 1 View    Result Date: 4/26/2024  Impression: No active cardiopulmonary disease Electronically Signed: Joel Jonas  4/26/2024 7:42 PM EDT  Workstation ID: [id number]     TTE 4/28/24:    Left ventricular systolic function is severely decreased.  Estimated left ventricular ejection fraction appears to be 26 - 30% with Lumason contrast.    The left ventricular cavity is moderately dilated.    Left ventricular wall thickness is consistent with mild concentric hypertrophy.    The right atrial cavity is mild to moderately dilated.    Mild to moderate mitral valve regurgitation is present.    When compared to the echocardiogram performed in October 2023, there is no significant change.  The calculated EF on both ultrasounds are approximately the same, but visually, the ejection fraction is estimated to be notably lower.    Current Medications:  dofetilide, 500 mcg, Oral, Q12H  empagliflozin, 10 mg, Oral, Daily  heparin (porcine), 5,000 Units, Subcutaneous, Q8H  metoprolol succinate XL, 25 mg, Oral, Nightly  mexiletine, 150 mg, Oral, Q8H  montelukast, 10 mg, Oral, Nightly  multivitamin with minerals, 1 tablet, Oral, Daily  sacubitril-valsartan, 1 tablet, Oral, Q12H  sodium chloride, 10 mL,  Intravenous, Q12H              Assessment:   Ventricular tachycardia  S/p VT RFA 3/27.24 by Dr. Parra  Returned 4/5/24 with several episodes 180-190 bpm fell into monitor zones. Zones changed to allow more VT addressed below  Tikosyn initiation 4/5/24  Nonischemic cardiomyopathy/chronic HFrEF  [facility] 10/19/21, normal coronaries. EF 30-35% with severe global hypokinesis  TTE 10/2023: LVEF 43% no VHD- calculated EF higher than visual estimate per Dr. Mcfarlane  TTE 4/28/24: LVEF 26-30%, mild-mod MR  Patient euvolemic on exam , spO2 >90% on room air  3.  [facility] single-chamber ICD  Device interrogation 4/26/24 acceptable threshold and impedance values, battery 7.5 years, VF 4/26/24 at 1523 s/p ATP x1 then defibrillation x1 at 29 J         Plan:   Patient presented to [facility] ED following episode of lightheadedness and palpitations followed by ICD discharge.   Patient underwent VT RFA of basal lateral LV circuits 3/27/24 by Dr. Parra and admitted at beginning of this month for multiple episodes of VT and ICD shocks. He was then started on Tikosyn as patient wants to avoid longterm toxicities a/w amiodarone.  QTc normal on EKGs. Continue metoprolol, dofetilide and agree with added mexiletine. Will monitor tele  Dr. Parra will reevaluate when available for role of repeat ablative strategies    Electronically signed by Barrett Ames PA-C, 04/29/24, 11:44 AM EDT.

## 2024-04-29 NOTE — CASE MANAGEMENT/SOCIAL WORK
Discharge Planning Assessment  [facility]     Patient Name: David Ravi  MRN: [id number]  Today's Date: 4/29/2024    Admit Date: 4/26/2024    Plan: Home with spouse   Discharge Needs Assessment       Row Name 04/29/24 0843       Living Environment    People in Home spouse    Name(s) of People in Home Arabella (spouse) [phone]    Current Living Arrangements home    Potentially Unsafe Housing Conditions none    Primary Care Provided by self    Provides Primary Care For no one    Family Caregiver if Needed spouse    Able to Return to Prior Arrangements yes       Resource/Environmental Concerns    Resource/Environmental Concerns none    Transportation Concerns none       Transition Planning    Patient/Family Anticipates Transition to home with family    Patient/Family Anticipated Services at Transition none    Transportation Anticipated family or friend will provide       Discharge Needs Assessment    Readmission Within the Last 30 Days current reason for admission unrelated to previous admission    Equipment Currently Used at Home none    Concerns to be Addressed denies needs/concerns at this time    Anticipated Changes Related to Illness none    Equipment Needed After Discharge none                   Discharge Plan       Row Name 04/29/24 0844       Plan    Plan Home with spouse    Patient/Family in Agreement with Plan yes    Plan Comments Spoke with patient at bedside. Lives with Arabella (spouse) [phone] in [employer] Is independent with ADL's. No problems with [facility] Medicare or medications. Uses no DME. No advanced directives. PCP is Jayant Newton MD. Plan is home with spouse. CM will continue to follow.    Final Discharge Disposition Code 01 - home or self-care                  Continued Care and Services - Admitted Since 4/26/2024    No active coordination exists for this encounter.          Demographic Summary       Row Name 04/29/24 0842       General Information    Admission Type inpatient     Arrived From emergency department    Referral Source admission list    Reason for Consult discharge planning    Preferred Language English       Contact Information    Permission Granted to Share Info With     Contact Information Obtained for                    Functional Status       Row Name 04/29/24 0843       Functional Status    Usual Activity Tolerance good    Current Activity Tolerance moderate       Functional Status, IADL    Medications independent    Meal Preparation independent    Housekeeping independent    Laundry independent    Shopping independent       Mental Status    General Appearance WDL WDL       Mental Status Summary    Recent Changes in Mental Status/Cognitive Functioning no changes       Employment/    Employment Status retired                   Psychosocial    No documentation.                  Abuse/Neglect    No documentation.                  Legal    No documentation.                  Substance Abuse    No documentation.                  Patient Forms    No documentation.                     Nicholas Casas RN

## 2024-04-29 NOTE — NURSING NOTE
David Ravi  :  1970  DOS:  24    Active Hospital Problems    Diagnosis     **V-tach     Ventricular tachycardia     ICD (implantable cardioverter-defibrillator) discharge     Cardiac defibrillator in place     Nonischemic cardiomyopathy     CHF NYHA class I     IRINA on CPAP        Medical records have been reviewed.  Patient is a good candidate for the [facility].  Education provided. Patient to be scheduled follow up 3-5 days post discharge.      Current Facility-Administered Medications:     acetaminophen (TYLENOL) tablet 650 mg, 650 mg, Oral, Q4H PRN, Jean Paul Boateng MD    albuterol (PROVENTIL) nebulizer solution 0.083% 2.5 mg/3mL, 2.5 mg, Nebulization, Q4H PRN, Jean Paul Boateng MD    sennosides-docusate (PERICOLACE) 8.6-50 MG per tablet 2 tablet, 2 tablet, Oral, BID PRN **AND** polyethylene glycol (MIRALAX) packet 17 g, 17 g, Oral, Daily PRN **AND** bisacodyl (DULCOLAX) EC tablet 5 mg, 5 mg, Oral, Daily PRN **AND** bisacodyl (DULCOLAX) suppository 10 mg, 10 mg, Rectal, Daily PRN, Jean Paul Boateng MD    Calcium Replacement - Follow Nurse / BPA Driven Protocol, , Does not apply, PRJESSICA, Jean Paul Boateng MD    dofetilide (TIKOSYN) capsule 500 mcg, 500 mcg, Oral, Q12H, Jean Paul Boateng MD, 500 mcg at 24 0820    empagliflozin (JARDIANCE) tablet 10 mg, 10 mg, Oral, Daily, Jean Paul Boateng MD, 10 mg at 24 0818    [Held by provider] furosemide (LASIX) tablet 20 mg, 20 mg, Oral, Daily PRN, Jean Paul Boateng MD    heparin (porcine) 5000 UNIT/ML injection 5,000 Units, 5,000 Units, Subcutaneous, Q8H, Jean Paul Boateng MD, 5,000 Units at 24 1406    Magnesium Cardiology Dose Replacement - Follow Nurse / BPA Driven Protocol, , Does not apply, PRN, Jean Paul Boateng MD    metoprolol succinate XL (TOPROL-XL) 24 hr tablet 25 mg, 25 mg, Oral, Nightly, Jean Paul Boateng MD, 25 mg at 24    mexiletine (MEXITIL) capsule 150 mg, 150 mg, Oral,  Q8H, Rohan Mcfarlane MD, 150 mg at 04/29/24 1407    montelukast (SINGULAIR) tablet 10 mg, 10 mg, Oral, Nightly, Jean Paul Boateng MD, 10 mg at 04/28/24 2027    multivitamin with minerals 1 tablet, 1 tablet, Oral, Daily, Jean Paul Boateng MD, 1 tablet at 04/29/24 0818    Phosphorus Replacement - Follow Nurse / BPA Driven Protocol, , Does not apply, PRKilo LOPEZ John L, MD    Potassium Replacement - Follow Nurse / BPA Driven Protocol, , Does not apply, Kilo PERKINS John L, MD    sacubitril-valsartan (ENTRESTO) 24-26 MG tablet 1 tablet, 1 tablet, Oral, Q12H, Rohan Mcfarlane MD, 1 tablet at 04/28/24 2027    sodium chloride 0.9 % flush 10 mL, 10 mL, Intravenous, PRN, Sukhdeep Ventura MD    sodium chloride 0.9 % flush 10 mL, 10 mL, Intravenous, Q12H, Jean Paul Boateng MD, 10 mL at 04/29/24 0822    sodium chloride 0.9 % flush 10 mL, 10 mL, Intravenous, PRN, Jean Paul Boateng MD    sodium chloride 0.9 % infusion 40 mL, 40 mL, Intravenous, Kilo PERKINS John L, MD     Echo Results:  Results for orders placed during the hospital encounter of 04/26/24    Adult Transthoracic Echo Complete w/ Color, Spectral and Contrast if Necessary Per Protocol    Interpretation Summary    Left ventricular systolic function is severely decreased.  Estimated left ventricular ejection fraction appears to be 26 - 30% with Lumason contrast.    The left ventricular cavity is moderately dilated.    Left ventricular wall thickness is consistent with mild concentric hypertrophy.    The right atrial cavity is mild to moderately dilated.    Mild to moderate mitral valve regurgitation is present.    When compared to the echocardiogram performed in October 2023, there is no significant change.  The calculated EF on both ultrasounds are approximately the same, but visually, the ejection fraction is estimated to be notably lower.       Heart Failure Education    [x]  Risk factors  []  Medications management and adherence  []  Low sodium diet  []   Fluid restriction:   [x]  Exercise/activity/cardiac rehab  []  Smoking cessation  [x]  Signs/symptoms  [x]  Daily weight management  [x]  Weight management  [x]  Importance of keeping follow up office visits  [x]  Role of [facility]  []  Other   []  Hyperkalemia  []  Other:    []  EF teaching    Unable to provide heart failure education today:  []  Patient/family refused   []  Not available  []  Not able to participate   []  Other:

## 2024-04-30 PROBLEM — I50.22 CHRONIC HFREF (HEART FAILURE WITH REDUCED EJECTION FRACTION): Status: ACTIVE | Noted: 2024-04-30

## 2024-04-30 LAB
ANION GAP SERPL CALCULATED.3IONS-SCNC: 10 MMOL/L (ref 5–15)
BUN SERPL-MCNC: 10 MG/DL (ref 6–20)
BUN/CREAT SERPL: 12.2 (ref 7–25)
CALCIUM SPEC-SCNC: 9.1 MG/DL (ref 8.6–10.5)
CHLORIDE SERPL-SCNC: 104 MMOL/L (ref 98–107)
CO2 SERPL-SCNC: 28 MMOL/L (ref 22–29)
CREAT SERPL-MCNC: 0.82 MG/DL (ref 0.76–1.27)
DEPRECATED RDW RBC AUTO: 41.6 FL (ref 37–54)
EGFRCR SERPLBLD CKD-EPI 2021: 105.7 ML/MIN/1.73
ERYTHROCYTE [DISTWIDTH] IN BLOOD BY AUTOMATED COUNT: 12.9 % (ref 12.3–15.4)
GLUCOSE SERPL-MCNC: 92 MG/DL (ref 65–99)
HCT VFR BLD AUTO: 52.3 % (ref 37.5–51)
HGB BLD-MCNC: 17.3 G/DL (ref 13–17.7)
MAGNESIUM SERPL-MCNC: 1.9 MG/DL (ref 1.6–2.6)
MCH RBC QN AUTO: 29.2 PG (ref 26.6–33)
MCHC RBC AUTO-ENTMCNC: 33.1 G/DL (ref 31.5–35.7)
MCV RBC AUTO: 88.2 FL (ref 79–97)
PLATELET # BLD AUTO: 185 10*3/MM3 (ref 140–450)
PMV BLD AUTO: 12.1 FL (ref 6–12)
POTASSIUM SERPL-SCNC: 3.8 MMOL/L (ref 3.5–5.2)
QT INTERVAL: 368 MS
QT INTERVAL: 414 MS
QTC INTERVAL: 460 MS
QTC INTERVAL: 489 MS
RBC # BLD AUTO: 5.93 10*6/MM3 (ref 4.14–5.8)
SODIUM SERPL-SCNC: 142 MMOL/L (ref 136–145)
WBC NRBC COR # BLD AUTO: 7.51 10*3/MM3 (ref 3.4–10.8)

## 2024-04-30 PROCEDURE — G0378 HOSPITAL OBSERVATION PER HR: HCPCS

## 2024-04-30 PROCEDURE — 25010000002 HEPARIN (PORCINE) PER 1000 UNITS: Performed by: STUDENT IN AN ORGANIZED HEALTH CARE EDUCATION/TRAINING PROGRAM

## 2024-04-30 PROCEDURE — 99214 OFFICE O/P EST MOD 30 MIN: CPT | Performed by: STUDENT IN AN ORGANIZED HEALTH CARE EDUCATION/TRAINING PROGRAM

## 2024-04-30 PROCEDURE — 96372 THER/PROPH/DIAG INJ SC/IM: CPT

## 2024-04-30 PROCEDURE — 93010 ELECTROCARDIOGRAM REPORT: CPT | Performed by: INTERNAL MEDICINE

## 2024-04-30 PROCEDURE — 85027 COMPLETE CBC AUTOMATED: CPT | Performed by: FAMILY MEDICINE

## 2024-04-30 PROCEDURE — 93005 ELECTROCARDIOGRAM TRACING: CPT | Performed by: INTERNAL MEDICINE

## 2024-04-30 PROCEDURE — 83735 ASSAY OF MAGNESIUM: CPT | Performed by: FAMILY MEDICINE

## 2024-04-30 PROCEDURE — 80048 BASIC METABOLIC PNL TOTAL CA: CPT | Performed by: FAMILY MEDICINE

## 2024-04-30 PROCEDURE — 99239 HOSP IP/OBS DSCHRG MGMT >30: CPT | Performed by: FAMILY MEDICINE

## 2024-04-30 PROCEDURE — 25010000002 MAGNESIUM SULFATE 4 GM/100ML SOLUTION: Performed by: FAMILY MEDICINE

## 2024-04-30 RX ORDER — MAGNESIUM SULFATE HEPTAHYDRATE 40 MG/ML
4 INJECTION, SOLUTION INTRAVENOUS ONCE
Status: COMPLETED | OUTPATIENT
Start: 2024-04-30 | End: 2024-04-30

## 2024-04-30 RX ORDER — MEXILETINE HYDROCHLORIDE 150 MG/1
150 CAPSULE ORAL EVERY 8 HOURS SCHEDULED
Qty: 90 CAPSULE | Refills: 0 | Status: SHIPPED | OUTPATIENT
Start: 2024-04-30

## 2024-04-30 RX ORDER — HYDROXYZINE HYDROCHLORIDE 25 MG/1
25 TABLET, FILM COATED ORAL EVERY 8 HOURS PRN
Status: DISCONTINUED | OUTPATIENT
Start: 2024-04-30 | End: 2024-05-01 | Stop reason: HOSPADM

## 2024-04-30 RX ADMIN — MAGNESIUM SULFATE IN WATER FOR 4 G: 40 INJECTION INTRAVENOUS at 13:30

## 2024-04-30 RX ADMIN — EMPAGLIFLOZIN 10 MG: 10 TABLET, FILM COATED ORAL at 10:00

## 2024-04-30 RX ADMIN — MONTELUKAST 10 MG: 10 TABLET, FILM COATED ORAL at 19:25

## 2024-04-30 RX ADMIN — HEPARIN SODIUM 5000 UNITS: 5000 INJECTION INTRAVENOUS; SUBCUTANEOUS at 05:26

## 2024-04-30 RX ADMIN — METOPROLOL SUCCINATE 25 MG: 25 TABLET, EXTENDED RELEASE ORAL at 19:25

## 2024-04-30 RX ADMIN — DOFETILIDE 500 MCG: 0.5 CAPSULE ORAL at 09:59

## 2024-04-30 RX ADMIN — SACUBITRIL AND VALSARTAN 1 TABLET: 24; 26 TABLET, FILM COATED ORAL at 10:00

## 2024-04-30 RX ADMIN — Medication 10 ML: at 10:01

## 2024-04-30 RX ADMIN — Medication 10 ML: at 19:26

## 2024-04-30 RX ADMIN — HEPARIN SODIUM 5000 UNITS: 5000 INJECTION INTRAVENOUS; SUBCUTANEOUS at 19:24

## 2024-04-30 RX ADMIN — Medication 1 TABLET: at 10:00

## 2024-04-30 RX ADMIN — HEPARIN SODIUM 5000 UNITS: 5000 INJECTION INTRAVENOUS; SUBCUTANEOUS at 13:29

## 2024-04-30 RX ADMIN — DOFETILIDE 500 MCG: 0.5 CAPSULE ORAL at 21:23

## 2024-04-30 RX ADMIN — MEXILETINE HYDROCHLORIDE 150 MG: 150 CAPSULE ORAL at 05:26

## 2024-04-30 RX ADMIN — HYDROXYZINE HYDROCHLORIDE 25 MG: 25 TABLET, FILM COATED ORAL at 18:48

## 2024-04-30 RX ADMIN — MEXILETINE HYDROCHLORIDE 150 MG: 150 CAPSULE ORAL at 13:32

## 2024-04-30 RX ADMIN — MEXILETINE HYDROCHLORIDE 150 MG: 150 CAPSULE ORAL at 21:23

## 2024-04-30 RX ADMIN — SACUBITRIL AND VALSARTAN 1 TABLET: 24; 26 TABLET, FILM COATED ORAL at 19:25

## 2024-04-30 NOTE — DISCHARGE SUMMARY
[facility] Medicine Services  DISCHARGE SUMMARY    Patient Name: David Ravi  : 1970  MRN: [id number]    Date of Admission: 2024  7:17 PM  Date of Discharge:  2024  Primary Care Physician: Jayant Newton MD    Consults       Date and Time Order Name Status Description    2024  1:18 AM Inpatient Cardiology Consult Completed             Hospital Course     Presenting Problem: ICD fire     Active Hospital Problems    Diagnosis  POA    **V-tach [I47.20]  Yes    Ventricular tachycardia [I47.20]  Yes    ICD (implantable cardioverter-defibrillator) discharge [Z45.02]  Not Applicable    Cardiac defibrillator in place [Z95.810]  Yes    Nonischemic cardiomyopathy [I42.8]  Yes    CHF NYHA class I [I50.9]  Yes    IRINA on CPAP [G47.33]  Yes      Resolved Hospital Problems   No resolved problems to display.          Hospital Course:  David Ravi is a 54 y.o. male presented with the below    History of nonischemic cardiomyopathy and compensated chronic systolic heart failure with subsequent V. tach and ICD in place  ICD discharge  -Status post radioablation in March of this year and admission earlier this month for Tikosyn loading.  Has been adherent to all medications and reports no recent changes in health or heart failure symptoms.  Potassium is low but otherwise labs are okay.  Troponin mildly elevated now downtrending.  He does appear to have frequent PVCs on telemetry.  He does report increased urinary frequency since recent dose increase of home Farxiga, but does not appear dehydrated.  ED consulted cardiology who recommends observation tonight and no additional medications at this time  -Interrogation of device showed VF with appropriate shock per EP  -Replete potassium  -Repeat EKG in the morning prior to next dose Tikosyn.  Continue metoprolol and Entresto (hold parameters), SGLT2 inhibitor  -Cardiology to evaluate and appreciate  recommendations  -Echo EF: 26 to 30%.  When compared to echocardiogram 10/2023 there is no significant change.  -Cardiology added mexiletine 04/27     Hypokalemia  -Cardiac replacement protocol ordered     IRINA  -Continue home CPAP, has been adherent            Discharge Follow Up Recommendations for outpatient labs/diagnostics:   Patient is to follow-up with his primary care within 1 week and follow-up with EP as scheduled.    Day of Discharge     HPI:   Patient is anxious to go home.  Denies any firing of the device.  Was notified by Dr. Parra from EP he was cleared for discharge.  Patient is to follow-up with his primary care within 1 week and follow-up with EP as scheduled.      Vital Signs:   Temp:  [97.7 °F (36.5 °C)-98.4 °F (36.9 °C)] 98.1 °F (36.7 °C)  Heart Rate:  [67-89] 79  Resp:  [18] 18  BP: (103-118)/(55-76) 109/76      Physical Exam:  Constitutional: No acute distress, awake, alert  HENT: NCAT, mucous membranes moist  Respiratory: Clear to auscultation bilaterally, respiratory effort normal   Cardiovascular: RRR, no murmurs, rubs, or gallops  Gastrointestinal: Positive bowel sounds, soft, nontender, nondistended  Musculoskeletal: No bilateral ankle edema  Psychiatric: Appropriate affect, cooperative  Neurologic: Oriented x 3, speech clear  Skin: No rashes    Pertinent  and/or Most Recent Results     LAB RESULTS:      Lab 04/30/24  0804 04/26/24 1956   WBC 7.51 9.29   HEMOGLOBIN 17.3 17.1   HEMATOCRIT 52.3* 52.5*   PLATELETS 185 201   NEUTROS ABS  --  6.66   IMMATURE GRANS (ABS)  --  0.03   LYMPHS ABS  --  1.81   MONOS ABS  --  0.69   EOS ABS  --  0.06   MCV 88.2 87.9   D DIMER QUANT  --  <0.27         Lab 04/30/24  0648 04/29/24  0747 04/28/24  1547 04/28/24  0415 04/27/24  1553 04/27/24  0528 04/27/24  0527 04/26/24 1956   SODIUM 142 140  --  143  --   --  142 143   POTASSIUM 3.8 4.1 4.5 3.5 4.3  --  3.4* 3.4*   CHLORIDE 104 102  --  105  --   --  105 104   CO2 28.0 27.0  --  27.0  --   --  27.0  26.0   ANION GAP 10.0 11.0  --  11.0  --   --  10.0 13.0   BUN 10 8  --  9  --   --  6 7   CREATININE 0.82 0.85  --  0.77  --   --  0.70* 0.70*   EGFR 105.7 104.6  --  107.7  --   --  110.9 110.9   GLUCOSE 92 94  --  97  --   --  91 98   CALCIUM 9.1 9.5  --  9.1  --   --  8.8 9.6   MAGNESIUM 1.9  --   --   --   --   --  2.5 2.0   PHOSPHORUS  --   --   --   --   --   --   --  2.8   HEMOGLOBIN A1C  --   --   --   --   --  5.30  --   --    TSH  --   --   --   --   --   --   --  1.360         Lab 04/26/24 1956   TOTAL PROTEIN 7.1   ALBUMIN 4.4   GLOBULIN 2.7   ALT (SGPT) 26   AST (SGOT) 24   BILIRUBIN 0.6   ALK PHOS 101         Lab 04/26/24  2248 04/26/24 1956   PROBNP  --  296.1   HSTROP T 23* 25*  25*         Lab 04/28/24  0415   CHOLESTEROL 121   LDL CHOL 54   HDL CHOL 48   TRIGLYCERIDES 99             Brief Urine Lab Results       None          Microbiology Results (last 10 days)       ** No results found for the last 240 hours. **            Adult Transthoracic Echo Complete w/ Color, Spectral and Contrast if Necessary Per Protocol    Result Date: 4/28/2024    Left ventricular systolic function is severely decreased.  Estimated left ventricular ejection fraction appears to be 26 - 30% with Lumason contrast.   The left ventricular cavity is moderately dilated.   Left ventricular wall thickness is consistent with mild concentric hypertrophy.   The right atrial cavity is mild to moderately dilated.   Mild to moderate mitral valve regurgitation is present.   When compared to the echocardiogram performed in October 2023, there is no significant change.  The calculated EF on both ultrasounds are approximately the same, but visually, the ejection fraction is estimated to be notably lower.     XR Chest 1 View    Result Date: 4/26/2024  XR CHEST 1 VW Date of Exam: 4/26/2024 7:19 PM EDT Indication: Dysrhythmia triage protocol Comparison: 4/5/2024 Findings: There is a left subclavian single lead AICD. Heart size  borderline. Pulmonary vasculature within normal limits. Pulse generator obscures the peripheral left midlung. Visualized lungs clear. Costophrenic angle sharp     Impression: No active cardiopulmonary disease Electronically Signed: Joel Sumi  4/26/2024 7:42 PM EDT  Workstation ID: [id number]             Results for orders placed during the hospital encounter of 04/26/24    Adult Transthoracic Echo Complete w/ Color, Spectral and Contrast if Necessary Per Protocol    Interpretation Summary    Left ventricular systolic function is severely decreased.  Estimated left ventricular ejection fraction appears to be 26 - 30% with Lumason contrast.    The left ventricular cavity is moderately dilated.    Left ventricular wall thickness is consistent with mild concentric hypertrophy.    The right atrial cavity is mild to moderately dilated.    Mild to moderate mitral valve regurgitation is present.    When compared to the echocardiogram performed in October 2023, there is no significant change.  The calculated EF on both ultrasounds are approximately the same, but visually, the ejection fraction is estimated to be notably lower.      Discharge Details        Discharge Medications        New Medications        Instructions Start Date   mexiletine 150 MG capsule  Commonly known as: MEXITIL   150 mg, Oral, Every 8 Hours Scheduled             Continue These Medications        Instructions Start Date   albuterol sulfate  (90 Base) MCG/ACT inhaler  Commonly known as: PROVENTIL HFA;VENTOLIN HFA;PROAIR HFA   2 puffs, Inhalation, Every 4 Hours PRN      ALLERGY SERUM INJECTION   Subcutaneous, Once,        azelastine 0.15 % solution nasal spray  Commonly known as: ASTEPRO   2 sprays into the nostril(s) as directed by provider Daily As Needed for Allergies.      dapagliflozin Propanediol 10 MG tablet   5 mg, Oral, Daily      dofetilide 500 MCG capsule  Commonly known as: TIKOSYN   500 mcg, Oral, Every 12 Hours Scheduled       furosemide 20 MG tablet  Commonly known as: LASIX   Take 1 tablet by mouth Daily As Needed (lower extremity swelling, or weight gain >2 lbs/24 hours).      levocetirizine 5 MG tablet  Commonly known as: XYZAL   5 mg, Oral, Every Evening      metoprolol succinate XL 25 MG 24 hr tablet  Commonly known as: TOPROL-XL   25 mg, Oral, Every 24 Hours Scheduled      montelukast 10 MG tablet  Commonly known as: SINGULAIR   10 mg, Oral, Nightly      multivitamin with minerals tablet tablet   1 tablet, Oral, Daily      nitroglycerin 0.4 MG SL tablet  Commonly known as: NITROSTAT   0.4 mg, Sublingual, As Needed      omeprazole 20 MG capsule  Commonly known as: priLOSEC   20 mg, Oral, As Needed      sacubitril-valsartan 24-26 MG tablet  Commonly known as: ENTRESTO   1 tablet, Oral, Every 12 Hours Scheduled      TYLENOL ARTHRITIS PAIN PO   2 tablets, Oral, As Needed             Stop These Medications      apixaban 5 MG tablet tablet  Commonly known as: ELIQUIS              Allergies   Allergen Reactions    Amoxicillin Anaphylaxis     difficulting breathing         Discharge Disposition:  Home or Self Care    Diet:  Hospital:  Diet Order   Procedures    Diet: Cardiac; Healthy Heart (2-3 Na+); Fluid Consistency: Thin (IDDSI 0)            Activity:      Restrictions or Other Recommendations:  Per instructions per EP       CODE STATUS:    Code Status and Medical Interventions:   Ordered at: 04/27/24 0014     Code Status (Patient has no pulse and is not breathing):    CPR (Attempt to Resuscitate)     Medical Interventions (Patient has pulse or is breathing):    Full Support       Future Appointments   Date Time Provider Department Center   6/25/2024  1:30 PM David Garcia PA [facility]   9/17/2024  2:15 PM Robert Parra MD [facility]                 Lien Levy MD  04/30/24      Time Spent on Discharge:  I spent  35  minutes on this discharge activity which included: face-to-face encounter with the patient,  reviewing the data in the system, coordination of the care with the nursing staff as well as consultants, documentation, and entering orders.

## 2024-04-30 NOTE — PROGRESS NOTES
[facility] Medicine Services  PROGRESS NOTE    Patient Name: David Ravi  : 1970  MRN: [id number]    Date of Admission: 2024  Primary Care Physician: Jayant Newton MD    Subjective   Subjective     CC:  ICD discharge     HPI:  Denies further ICD discharge. Denies CP, SOA, N/V/F/C.  Awaiting final recommendations from EP.      Objective   Objective     Vital Signs:   Temp:  [97.7 °F (36.5 °C)-98 °F (36.7 °C)] 97.8 °F (36.6 °C)  Heart Rate:  [67-80] 67  Resp:  [18] 18  BP: ()/(58-75) 104/58     Physical Exam:  Constitutional: No acute distress, awake, alert  HENT: NCAT, mucous membranes moist  Respiratory: Clear to auscultation bilaterally, respiratory effort normal   Cardiovascular: RRR, no murmurs, rubs, or gallops  Gastrointestinal: Positive bowel sounds, soft, nontender, nondistended  Musculoskeletal: No bilateral ankle edema  Psychiatric: Appropriate affect, cooperative  Neurologic: Oriented x 3, speech clear  Skin: No rashes    Results Reviewed:  LAB RESULTS:      Lab 24   WBC 9.29   HEMOGLOBIN 17.1   HEMATOCRIT 52.5*   PLATELETS 201   NEUTROS ABS 6.66   IMMATURE GRANS (ABS) 0.03   LYMPHS ABS 1.81   MONOS ABS 0.69   EOS ABS 0.06   MCV 87.9   D DIMER QUANT <0.27         Lab 24  0747 24  1547 24  0415 24  1553 24  0528 24  0527 24   SODIUM 140  --  143  --   --  142 143   POTASSIUM 4.1 4.5 3.5 4.3  --  3.4* 3.4*   CHLORIDE 102  --  105  --   --  105 104   CO2 27.0  --  27.0  --   --  27.0 26.0   ANION GAP 11.0  --  11.0  --   --  10.0 13.0   BUN 8  --  9  --   --  6 7   CREATININE 0.85  --  0.77  --   --  0.70* 0.70*   EGFR 104.6  --  107.7  --   --  110.9 110.9   GLUCOSE 94  --  97  --   --  91 98   CALCIUM 9.5  --  9.1  --   --  8.8 9.6   MAGNESIUM  --   --   --   --   --  2.5 2.0   PHOSPHORUS  --   --   --   --   --   --  2.8   HEMOGLOBIN A1C  --   --   --   --  5.30  --   --    TSH  --   --   " --   --   --   --  1.360         Lab 04/26/24 1956   TOTAL PROTEIN 7.1   ALBUMIN 4.4   GLOBULIN 2.7   ALT (SGPT) 26   AST (SGOT) 24   BILIRUBIN 0.6   ALK PHOS 101         Lab 04/26/24 2248 04/26/24 1956   PROBNP  --  296.1   HSTROP T 23* 25*  25*         Lab 04/28/24  0415   CHOLESTEROL 121   LDL CHOL 54   HDL CHOL 48   TRIGLYCERIDES 99             Brief Urine Lab Results       None            Cultures:  No results found for: \"BLOODCX\", \"URINECX\", \"WOUNDCX\", \"MRSACX\", \"RESPCX\", \"STOOLCX\"    Microbiology Results Abnormal       None            Adult Transthoracic Echo Complete w/ Color, Spectral and Contrast if Necessary Per Protocol    Result Date: 4/28/2024    Left ventricular systolic function is severely decreased.  Estimated left ventricular ejection fraction appears to be 26 - 30% with Lumason contrast.   The left ventricular cavity is moderately dilated.   Left ventricular wall thickness is consistent with mild concentric hypertrophy.   The right atrial cavity is mild to moderately dilated.   Mild to moderate mitral valve regurgitation is present.   When compared to the echocardiogram performed in October 2023, there is no significant change.  The calculated EF on both ultrasounds are approximately the same, but visually, the ejection fraction is estimated to be notably lower.      Results for orders placed during the hospital encounter of 04/26/24    Adult Transthoracic Echo Complete w/ Color, Spectral and Contrast if Necessary Per Protocol    Interpretation Summary    Left ventricular systolic function is severely decreased.  Estimated left ventricular ejection fraction appears to be 26 - 30% with Lumason contrast.    The left ventricular cavity is moderately dilated.    Left ventricular wall thickness is consistent with mild concentric hypertrophy.    The right atrial cavity is mild to moderately dilated.    Mild to moderate mitral valve regurgitation is present.    When compared to the echocardiogram " performed in October 2023, there is no significant change.  The calculated EF on both ultrasounds are approximately the same, but visually, the ejection fraction is estimated to be notably lower.      Current medications:  Scheduled Meds:dofetilide, 500 mcg, Oral, Q12H  empagliflozin, 10 mg, Oral, Daily  heparin (porcine), 5,000 Units, Subcutaneous, Q8H  metoprolol succinate XL, 25 mg, Oral, Nightly  mexiletine, 150 mg, Oral, Q8H  montelukast, 10 mg, Oral, Nightly  multivitamin with minerals, 1 tablet, Oral, Daily  sacubitril-valsartan, 1 tablet, Oral, Q12H  sodium chloride, 10 mL, Intravenous, Q12H      Continuous Infusions:   PRN Meds:.  acetaminophen    albuterol    senna-docusate sodium **AND** polyethylene glycol **AND** bisacodyl **AND** bisacodyl    Calcium Replacement - Follow Nurse / BPA Driven Protocol    [Held by provider] furosemide    Magnesium Cardiology Dose Replacement - Follow Nurse / BPA Driven Protocol    Phosphorus Replacement - Follow Nurse / BPA Driven Protocol    Potassium Replacement - Follow Nurse / BPA Driven Protocol    sodium chloride    sodium chloride    sodium chloride    Assessment & Plan   Assessment & Plan     Active Hospital Problems    Diagnosis  POA    **V-tach [I47.20]  Yes    Ventricular tachycardia [I47.20]  Yes    ICD (implantable cardioverter-defibrillator) discharge [Z45.02]  Not Applicable    Cardiac defibrillator in place [Z95.810]  Yes    Nonischemic cardiomyopathy [I42.8]  Yes    CHF NYHA class I [I50.9]  Yes    IRINA on CPAP [G47.33]  Yes      Resolved Hospital Problems   No resolved problems to display.        Brief Hospital Course to date:    History of nonischemic cardiomyopathy and compensated chronic systolic heart failure with subsequent V. tach and ICD in place  ICD discharge  -Status post radioablation in March of this year and admission earlier this month for Tikosyn loading.  Has been adherent to all medications and reports no recent changes in health or heart  failure symptoms.  Potassium is low but otherwise labs are okay.  Troponin mildly elevated now downtrending.  He does appear to have frequent PVCs on telemetry.  He does report increased urinary frequency since recent dose increase of home Farxiga, but does not appear dehydrated.  ED consulted cardiology who recommends observation tonight and no additional medications at this time  -Interrogation of device showed VF with appropriate shock per EP  -Replete potassium  -Repeat EKG in the morning prior to next dose Tikosyn.  Continue metoprolol and Entresto (hold parameters), SGLT2 inhibitor  -Cardiology to evaluate and appreciate recommendations  -Echo EF: 26 to 30%.  When compared to echocardiogram 10/2023 there is no significant change.  -Cardiology added mexiletine 04/27     Hypokalemia  -Cardiac replacement protocol ordered     IRINA  -Continue home CPAP, has been adherent       Expected Discharge Location and Transportation: TBD  Expected Discharge TBD  Expected discharge date/ time has not been documented.     DVT prophylaxis:  Medical DVT prophylaxis orders are present.         AM-PAC 6 Clicks Score (PT): 24 (04/28/24 0923)    CODE STATUS:   Code Status and Medical Interventions:   Ordered at: 04/27/24 0014     Code Status (Patient has no pulse and is not breathing):    CPR (Attempt to Resuscitate)     Medical Interventions (Patient has pulse or is breathing):    Full Support       Lien Levy MD  04/30/24

## 2024-04-30 NOTE — PROGRESS NOTES
"  [facility]  CARDIAC EP PROGRESS NOTE    Date of Admission: 4/26/2024  Date of Service: 04/30/24    Primary Care Physician: Jayant Newton MD    Chief Complaint: VF, ICD shock      Subjective      HPI: No major events overnight.  Patient doing well today.  No recurrent episodes of VT.  Tolerated mexiletine well.      Objective   Vitals: /76 (BP Location: Left arm, Patient Position: Lying)   Pulse 79   Temp 98.1 °F (36.7 °C) (Oral)   Resp 18   Ht 180.3 cm (70.98\")   Wt 108 kg (237 lb 14.4 oz)   SpO2 95%   BMI 33.20 kg/m²     Physical Exam:   GENERAL: Alert, cooperative, in no acute distress.   HEENT: Normocephalic, no jugular venous distention  HEART: Regular rate and rhythm; no murmurs, rubs or gallops  LUNGS: Clear to auscultation bilaterally. No wheezing, rales or rhonchi. Nonlabored breathing  ABDOMEN: Soft, nontender   NEUROLOGIC: No gross focal abnormalities  EXTREMITIES: No obvious deformities, cyanosis, or edema noted.   PSYCH: normal mood, behavior    Results:  Results from last 7 days   Lab Units 04/30/24  0804 04/26/24  1956   WBC 10*3/mm3 7.51 9.29   HEMOGLOBIN g/dL 17.3 17.1   HEMATOCRIT % 52.3* 52.5*   PLATELETS 10*3/mm3 185 201     Results from last 7 days   Lab Units 04/30/24  0648 04/29/24  0747 04/28/24  1547 04/28/24  0415   SODIUM mmol/L 142 140  --  143   POTASSIUM mmol/L 3.8 4.1 4.5 3.5   CHLORIDE mmol/L 104 102  --  105   CO2 mmol/L 28.0 27.0  --  27.0   BUN mg/dL 10 8  --  9   CREATININE mg/dL 0.82 0.85  --  0.77   GLUCOSE mg/dL 92 94  --  97      Lab Results   Component Value Date    CHOL 121 04/28/2024    TRIG 99 04/28/2024    HDL 48 04/28/2024    LDL 54 04/28/2024    AST 24 04/26/2024    ALT 26 04/26/2024     Results from last 7 days   Lab Units 04/27/24  0528   HEMOGLOBIN A1C % 5.30     Results from last 7 days   Lab Units 04/28/24  0415   CHOLESTEROL mg/dL 121   TRIGLYCERIDES mg/dL 99   HDL CHOL mg/dL 48   LDL CHOL mg/dL 54 "     Results from last 7 days   Lab Units 04/26/24 1956   TSH uIU/mL 1.360   FREE T4 ng/dL 1.07             Results from last 7 days   Lab Units 04/26/24  2248 04/26/24 1956   HSTROP T ng/L 23* 25*  25*     Results from last 7 days   Lab Units 04/26/24 1956   PROBNP pg/mL 296.1         Intake/Output Summary (Last 24 hours) at 4/30/2024 1802  Last data filed at 4/30/2024 1330  Gross per 24 hour   Intake 1250 ml   Output 2950 ml   Net -1700 ml       I personally reviewed the patient's EKG/Telemetry data    Radiology Data:   XR Chest 1 View    Result Date: 4/26/2024  Impression: No active cardiopulmonary disease Electronically Signed: Joel Jonas  4/26/2024 7:42 PM EDT  Workstation ID: [id number]     TTE 4/28/24:    Left ventricular systolic function is severely decreased.  Estimated left ventricular ejection fraction appears to be 26 - 30% with Lumason contrast.    The left ventricular cavity is moderately dilated.    Left ventricular wall thickness is consistent with mild concentric hypertrophy.    The right atrial cavity is mild to moderately dilated.    Mild to moderate mitral valve regurgitation is present.    When compared to the echocardiogram performed in October 2023, there is no significant change.  The calculated EF on both ultrasounds are approximately the same, but visually, the ejection fraction is estimated to be notably lower.    Current Medications:  dofetilide, 500 mcg, Oral, Q12H  empagliflozin, 10 mg, Oral, Daily  heparin (porcine), 5,000 Units, Subcutaneous, Q8H  metoprolol succinate XL, 25 mg, Oral, Nightly  mexiletine, 150 mg, Oral, Q8H  montelukast, 10 mg, Oral, Nightly  multivitamin with minerals, 1 tablet, Oral, Daily  sacubitril-valsartan, 1 tablet, Oral, Q12H  sodium chloride, 10 mL, Intravenous, Q12H              Assessment:   Ventricular tachycardia  S/p VT RFA 3/27.24 by Dr. Adan  Returned 4/5/24 with several episodes 180-190 bpm fell into monitor zones. Zones changed to allow more VT  addressed below  Tikosyn initiation 4/5/24  Nonischemic cardiomyopathy/chronic HFrEF  [facility] 10/19/21, normal coronaries. EF 30-35% with severe global hypokinesis  TTE 10/2023: LVEF 43% no VHD- calculated EF higher than visual estimate per Dr. Mcfarlane  TTE 4/28/24: LVEF 26-30%, mild-mod MR  Patient euvolemic on exam , spO2 >90% on room air  3.  [facility] [place] single-chamber ICD  Device interrogation 4/26/24 acceptable threshold and impedance values, battery 7.5 years, VF 4/26/24 at 1523 s/p ATP x1 then defibrillation x1 at 29 J         Plan:   Patient started on 150 mg 3 times daily of mexiletine and tolerated well.  Will plan to discharge home on this dose.  Could consider uptitrating as an outpatient if needed  Discussed the option for repeat VT ablation if he continues to have episodes in the future.  Would likely not sedate and epicardial approach.  Discussed option of referral to the advanced heart failure clinic at .  Given his further reduction in ejection fraction as well as recurrent ventricular arrhythmias I think this would be helpful for his long-term care.  They are going to consider this.  Okay to discharge home today from cardiology perspective.

## 2024-04-30 NOTE — CASE MANAGEMENT/SOCIAL WORK
Continued Stay Note   [place]     Patient Name: David Ravi  MRN: [id number]  Today's Date: 4/30/2024    Admit Date: 4/26/2024    Plan: Home with spouse   Discharge Plan       Row Name 04/30/24 0900       Plan    Plan Home with spouse    Patient/Family in Agreement with Plan yes    Plan Comments Spoke with patient at bedside. Plan is home with spouse. Patient denies any discharge needs at this time. CM will continue to follow.    Final Discharge Disposition Code 01 - home or self-care                   Discharge Codes    No documentation.                       Nicholas Casas RN

## 2024-05-01 ENCOUNTER — READMISSION MANAGEMENT (OUTPATIENT)
Dept: CALL CENTER | Facility: HOSPITAL | Age: 54
End: 2024-05-01
Payer: MEDICARE

## 2024-05-01 VITALS
RESPIRATION RATE: 18 BRPM | HEIGHT: 71 IN | SYSTOLIC BLOOD PRESSURE: 112 MMHG | TEMPERATURE: 97.7 F | DIASTOLIC BLOOD PRESSURE: 77 MMHG | OXYGEN SATURATION: 96 % | BODY MASS INDEX: 32.9 KG/M2 | WEIGHT: 235 LBS | HEART RATE: 75 BPM

## 2024-05-01 LAB
ANION GAP SERPL CALCULATED.3IONS-SCNC: 11 MMOL/L (ref 5–15)
BUN SERPL-MCNC: 9 MG/DL (ref 6–20)
BUN/CREAT SERPL: 10.8 (ref 7–25)
CALCIUM SPEC-SCNC: 9.2 MG/DL (ref 8.6–10.5)
CHLORIDE SERPL-SCNC: 105 MMOL/L (ref 98–107)
CO2 SERPL-SCNC: 27 MMOL/L (ref 22–29)
CREAT SERPL-MCNC: 0.83 MG/DL (ref 0.76–1.27)
DEPRECATED RDW RBC AUTO: 42.1 FL (ref 37–54)
EGFRCR SERPLBLD CKD-EPI 2021: 105.3 ML/MIN/1.73
ERYTHROCYTE [DISTWIDTH] IN BLOOD BY AUTOMATED COUNT: 12.9 % (ref 12.3–15.4)
GLUCOSE SERPL-MCNC: 94 MG/DL (ref 65–99)
HCT VFR BLD AUTO: 52 % (ref 37.5–51)
HGB BLD-MCNC: 17.1 G/DL (ref 13–17.7)
MAGNESIUM SERPL-MCNC: 2.6 MG/DL (ref 1.6–2.6)
MAGNESIUM SERPL-MCNC: 2.6 MG/DL (ref 1.6–2.6)
MCH RBC QN AUTO: 29.1 PG (ref 26.6–33)
MCHC RBC AUTO-ENTMCNC: 32.9 G/DL (ref 31.5–35.7)
MCV RBC AUTO: 88.4 FL (ref 79–97)
PLATELET # BLD AUTO: 182 10*3/MM3 (ref 140–450)
PMV BLD AUTO: 11.4 FL (ref 6–12)
POTASSIUM SERPL-SCNC: 3.7 MMOL/L (ref 3.5–5.2)
QT INTERVAL: 418 MS
QT INTERVAL: 418 MS
QT INTERVAL: 426 MS
QTC INTERVAL: 434 MS
QTC INTERVAL: 460 MS
QTC INTERVAL: 472 MS
RBC # BLD AUTO: 5.88 10*6/MM3 (ref 4.14–5.8)
SODIUM SERPL-SCNC: 143 MMOL/L (ref 136–145)
WBC NRBC COR # BLD AUTO: 8.92 10*3/MM3 (ref 3.4–10.8)

## 2024-05-01 PROCEDURE — 93005 ELECTROCARDIOGRAM TRACING: CPT | Performed by: INTERNAL MEDICINE

## 2024-05-01 PROCEDURE — 96372 THER/PROPH/DIAG INJ SC/IM: CPT

## 2024-05-01 PROCEDURE — 85027 COMPLETE CBC AUTOMATED: CPT | Performed by: FAMILY MEDICINE

## 2024-05-01 PROCEDURE — 80048 BASIC METABOLIC PNL TOTAL CA: CPT | Performed by: FAMILY MEDICINE

## 2024-05-01 PROCEDURE — 83735 ASSAY OF MAGNESIUM: CPT | Performed by: FAMILY MEDICINE

## 2024-05-01 PROCEDURE — 99214 OFFICE O/P EST MOD 30 MIN: CPT | Performed by: STUDENT IN AN ORGANIZED HEALTH CARE EDUCATION/TRAINING PROGRAM

## 2024-05-01 PROCEDURE — 93010 ELECTROCARDIOGRAM REPORT: CPT | Performed by: INTERNAL MEDICINE

## 2024-05-01 PROCEDURE — 25010000002 HEPARIN (PORCINE) PER 1000 UNITS: Performed by: STUDENT IN AN ORGANIZED HEALTH CARE EDUCATION/TRAINING PROGRAM

## 2024-05-01 PROCEDURE — G0378 HOSPITAL OBSERVATION PER HR: HCPCS

## 2024-05-01 RX ORDER — HYDROXYZINE HYDROCHLORIDE 25 MG/1
25 TABLET, FILM COATED ORAL DAILY PRN
Qty: 90 TABLET | Refills: 0 | Status: SHIPPED | OUTPATIENT
Start: 2024-05-01

## 2024-05-01 RX ORDER — HYDROXYZINE HYDROCHLORIDE 25 MG/1
25 TABLET, FILM COATED ORAL EVERY 8 HOURS PRN
Qty: 90 TABLET | Refills: 0 | Status: SHIPPED | OUTPATIENT
Start: 2024-05-01 | End: 2024-05-01

## 2024-05-01 RX ADMIN — SACUBITRIL AND VALSARTAN 1 TABLET: 24; 26 TABLET, FILM COATED ORAL at 10:01

## 2024-05-01 RX ADMIN — DOFETILIDE 500 MCG: 0.5 CAPSULE ORAL at 10:01

## 2024-05-01 RX ADMIN — Medication 10 ML: at 10:02

## 2024-05-01 RX ADMIN — HEPARIN SODIUM 5000 UNITS: 5000 INJECTION INTRAVENOUS; SUBCUTANEOUS at 05:02

## 2024-05-01 RX ADMIN — MEXILETINE HYDROCHLORIDE 150 MG: 150 CAPSULE ORAL at 05:03

## 2024-05-01 RX ADMIN — EMPAGLIFLOZIN 10 MG: 10 TABLET, FILM COATED ORAL at 10:01

## 2024-05-01 RX ADMIN — Medication 1 TABLET: at 10:01

## 2024-05-01 NOTE — CASE MANAGEMENT/SOCIAL WORK
Case Management Discharge Note      Final Note: Plan is home with spouse. Spouse will transport. Patient denies any discharge needs at this time.         Selected Continued Care - Admitted Since 4/26/2024       Destination    No services have been selected for the patient.                Durable Medical Equipment    No services have been selected for the patient.                Dialysis/Infusion    No services have been selected for the patient.                Home Medical Care    No services have been selected for the patient.                Therapy    No services have been selected for the patient.                Community Resources    No services have been selected for the patient.                Community & DME    No services have been selected for the patient.                         Final Discharge Disposition Code: 01 - home or self-care

## 2024-05-01 NOTE — PLAN OF CARE
Goal Outcome Evaluation:  Plan of Care Reviewed With: patient           Outcome Evaluation: Pt with discharge orders.

## 2024-05-01 NOTE — PROGRESS NOTES
Nonbillable note      Patient was supposed to be discharged yesterday.  Developed severe anxiety and became panicky after discussing the need to follow with [facility] advanced [facility] failure clinic for possible heart transplant in the future and requested to stay overnight    This morning he is less anxious.  Clinical exam is benign.  No evidence of V. tach overnight.  Wants to go home    Discharge medications are the same except for newly added hydroxyzine as needed for anxiety

## 2024-05-01 NOTE — CASE MANAGEMENT/SOCIAL WORK
Continued Stay Note  [facility]     Patient Name: David Ravi  MRN: [id number]  Today's Date: 5/1/2024    Admit Date: 4/26/2024    Plan: Home with spouse   Discharge Plan       Row Name 05/01/24 0838       Plan    Plan Home with spouse    Patient/Family in Agreement with Plan yes    Plan Comments Spoke with patient at bedside. Plan is home with spouse. IMM letter given to patient. Patient denies any discharge needs at this time. CM will continue to follow.    Final Discharge Disposition Code 01 - home or self-care                   Discharge Codes    No documentation.                 Expected Discharge Date and Time       Expected Discharge Date Expected Discharge Time    Apr 30, 2024               Nicholas Casas RN

## 2024-05-01 NOTE — PLAN OF CARE
Goal Outcome Evaluation:                 Patient A&Ox4. VSS, on RA. No c/o of SOA or pain. No acute events to note over night. Patient resting comfortably in bed, call light in reach. No further needs at this time.

## 2024-05-02 ENCOUNTER — TELEPHONE (OUTPATIENT)
Dept: CARDIOLOGY | Facility: CLINIC | Age: 54
End: 2024-05-02
Payer: MEDICARE

## 2024-05-02 NOTE — OUTREACH NOTE
Prep Survey      Flowsheet Row Responses   [facility] facility patient discharged from? [place]   Is LACE score < 7 ? No   Eligibility Readm Mgmt   Discharge diagnosis V-tach   Does the patient have one of the following disease processes/diagnoses(primary or secondary)? Other   Does the patient have Home health ordered? No   Is there a DME ordered? No   Prep survey completed? Yes            VALENTINE GARZA - Registered Nurse

## 2024-05-02 NOTE — TELEPHONE ENCOUNTER
I notified the patient. He is very anxious at this time and I advised him to reach out to the pharmacy and his PCP to see what drug class would work best at this time to help with his anxiety. Patient was agreeable.

## 2024-05-02 NOTE — TELEPHONE ENCOUNTER
Patient called and left  stating he was advised by Dr. Parra of possible interaction between tikosyn and hydroxyzine while he was in the hospital but he was discharged home with both medications and wanted to make sure it was safe for him to take both.

## 2024-05-08 ENCOUNTER — OFFICE VISIT (OUTPATIENT)
Dept: CARDIOLOGY | Facility: HOSPITAL | Age: 54
End: 2024-05-08
Payer: MEDICARE

## 2024-05-08 ENCOUNTER — HOSPITAL ENCOUNTER (OUTPATIENT)
Dept: GENERAL RADIOLOGY | Facility: HOSPITAL | Age: 54
Discharge: HOME | End: 2024-05-08
Payer: MEDICARE

## 2024-05-08 VITALS
HEIGHT: 71 IN | DIASTOLIC BLOOD PRESSURE: 76 MMHG | OXYGEN SATURATION: 94 % | WEIGHT: 229 LBS | HEART RATE: 84 BPM | BODY MASS INDEX: 32.06 KG/M2 | SYSTOLIC BLOOD PRESSURE: 120 MMHG

## 2024-05-08 DIAGNOSIS — R06.09 DYSPNEA ON EXERTION: ICD-10-CM

## 2024-05-08 DIAGNOSIS — R06.09 DYSPNEA ON EXERTION: Primary | ICD-10-CM

## 2024-05-08 DIAGNOSIS — I50.22 CHRONIC SYSTOLIC CONGESTIVE HEART FAILURE, NYHA CLASS 1: ICD-10-CM

## 2024-05-08 DIAGNOSIS — I42.8 NONISCHEMIC CARDIOMYOPATHY: ICD-10-CM

## 2024-05-08 DIAGNOSIS — I49.9 VENTRICULAR ARRHYTHMIA: ICD-10-CM

## 2024-05-08 PROCEDURE — 71046 X-RAY EXAM CHEST 2 VIEWS: CPT

## 2024-05-14 ENCOUNTER — READMISSION MANAGEMENT (OUTPATIENT)
Dept: CALL CENTER | Facility: HOSPITAL | Age: 54
End: 2024-05-14
Payer: MEDICARE

## 2024-05-14 NOTE — OUTREACH NOTE
Medical Week 2 Survey      Flowsheet Row Responses   [facility] patient discharged from? [place]   Does the patient have one of the following disease processes/diagnoses(primary or secondary)? Other   Week 2 attempt successful? No   Unsuccessful attempts Attempt 1  [attempted all numbers.]            Gerri CALLAHAN - Registered Nurse

## 2024-05-21 ENCOUNTER — READMISSION MANAGEMENT (OUTPATIENT)
Dept: CALL CENTER | Facility: HOSPITAL | Age: 54
End: 2024-05-21
Payer: MEDICARE

## 2024-05-21 NOTE — OUTREACH NOTE
Medical Week 3 Survey      Flowsheet Row Responses   [facility] [place] patient discharged from? [place]   Does the patient have one of the following disease processes/diagnoses(primary or secondary)? Other   Week 3 attempt successful? No   Unsuccessful attempts Attempt 1            Barbara SANCHEZ - Licensed Nurse

## 2024-05-24 RX ORDER — MEXILETINE HYDROCHLORIDE 150 MG/1
150 CAPSULE ORAL EVERY 8 HOURS SCHEDULED
Qty: 270 CAPSULE | Refills: 1 | Status: SHIPPED | OUTPATIENT
Start: 2024-05-24

## 2024-05-24 NOTE — TELEPHONE ENCOUNTER
Caller: David Ravi    Relationship: Self    Best call back number: [phone]    Requested Prescriptions:   Requested Prescriptions     Pending Prescriptions Disp Refills    mexiletine (MEXITIL) 150 MG capsule 90 capsule 0     Sig: Take 1 capsule by mouth Every 8 (Eight) Hours.        Pharmacy where request should be sent: [facility]     Last office visit with prescribing clinician: 1/30/2024   Last telemedicine visit with prescribing clinician: Visit date not found   Next office visit with prescribing clinician: 5/28/2024     Additional details provided by patient:     Does the patient have less than a 3 day supply:  [x] Yes  [] No      Yani Black Rep   05/24/24 09:07 EDT

## 2024-05-24 NOTE — TELEPHONE ENCOUNTER
Tried to call patient and wife multiple times to ensure correct pharmacy to fill medication at and if they would prefer 30 or 90 day supply. Patient and wife did not answer. Left VM on wife's phone. Will send in refill to only pharmacy on file.

## 2024-05-28 ENCOUNTER — OFFICE VISIT (OUTPATIENT)
Dept: CARDIOLOGY | Facility: CLINIC | Age: 54
End: 2024-05-28
Payer: MEDICARE

## 2024-05-28 VITALS
WEIGHT: 233 LBS | BODY MASS INDEX: 32.62 KG/M2 | OXYGEN SATURATION: 96 % | HEART RATE: 89 BPM | HEIGHT: 71 IN | DIASTOLIC BLOOD PRESSURE: 68 MMHG | SYSTOLIC BLOOD PRESSURE: 108 MMHG

## 2024-05-28 DIAGNOSIS — Z45.02 ICD (IMPLANTABLE CARDIOVERTER-DEFIBRILLATOR) DISCHARGE: ICD-10-CM

## 2024-05-28 DIAGNOSIS — Z79.899 LONG TERM CURRENT USE OF ANTIARRHYTHMIC MEDICAL THERAPY: ICD-10-CM

## 2024-05-28 DIAGNOSIS — I49.9 VENTRICULAR ARRHYTHMIA: ICD-10-CM

## 2024-05-28 DIAGNOSIS — I42.8 NONISCHEMIC CARDIOMYOPATHY: Primary | ICD-10-CM

## 2024-05-28 RX ORDER — POTASSIUM CHLORIDE 1.5 G/1
POWDER, FOR SOLUTION ORAL
COMMUNITY
Start: 2024-05-02

## 2024-05-28 RX ORDER — LANOLIN ALCOHOL/MO/W.PET/CERES
1 CREAM (GRAM) TOPICAL DAILY
COMMUNITY
Start: 2024-05-06

## 2024-05-28 NOTE — PROGRESS NOTES
Cardiac Electrophysiology Outpatient Note  [facility]    Office Visit     David Ravi  [id number]  05/28/2024    Primary Care Physician: Jayant Newton MD    Referred By: No ref. provider found    Subjective     Chief Complaint   Patient presents with    Ventricular arrhythmia     UPDATED PROBLEM LIST:  Nonischemic cardiomyopathy complicated by ventricular tachycardia   Left heart catheterization, circa 2000 by Rohan Bautista MD, showed normal coronary arteries.  Echocardiogram, 08/20/2015, showed moderate global hypokinesis, ejection fraction of 25% to 30%.  GXT myocardial perfusion, 08/25/2015, showed dilated  LV; no reversible ischemia.   Left heart catheterization, 09/08/2015, Dr. Rohan Bautista: Normal coronary arteries, EF 30%. Placement of LifeVest.  [facility], 10/19/21, normal coronaries. EF 30-35% with severe global hypokinesis. Initiated on jardiance.   Status post [employer] single chamber ICD implantation, November 2015.   TTE 10/2023: LVEF 43% no VHD  Cardiac MRI 3/2024: LVEF 42% with regional akinesis in the basal inferolateral wall, no LGE visualized but LV scar assessment suboptimal due to cardiac device  VT RFA by Dr. Parra 3/27/24  TTE 4/28/2024: LVEF 26-30%, Mild to moderate MR, no significant change from 10/2023 study  Tikosyn initiation 4/5/24, then mexiletine 4/30/2024  Obesity, BMI 35.   Obstructive sleep apnea with CPAP.   Asthma.   GERD.   History of vertigo.    History of Present Illness:   David Ravi is a 54 y.o. male who presents to my electrophysiology clinic for follow up of nonischemic cardiomyopathy and ventricular tachycardia s/p ICD and VT ablation 3/2024 by Dr. Parra now on Tikosyn and mexiletine. Since the patient's discharge 5/1/2024, he experienced some shortness of breath up until around 5-6 days ago when it finally improved. He has had no ICD shocks, palpitations, chest pain or near syncopal episodes. He believes  a lot of his dyspnea was related to anxiety which is improving the longer he has gone without ICD therapies. We have been trying to find him an appropriate referral to help support his anxiousness related to prior ICD shocks but it has not been fruitful yet. He is waiting for consultation from  advanced heart failure clinic.     Past Medical History:   Diagnosis Date    Arrhythmia     Asthma     CHF NYHA class I     Coronary artery disease 2000    GERD (gastroesophageal reflux disease)     Heart disease     History of vertigo     Hyperlipidemia     Nonischemic cardiomyopathy     Obesity     IRINA on CPAP     Ventricular arrhythmia 01/30/2024       Past Surgical History:   Procedure Laterality Date    ABLATION OF DYSRHYTHMIC FOCUS      CARDIAC CATHETERIZATION      CARDIAC CATHETERIZATION Left 10/19/2021    Procedure: Left Heart Cath;  Surgeon: Shankar Sanchez MD;  Location:  [facility];  Service: Cardiovascular;  Laterality: Left;    CARDIAC DEFIBRILLATOR PLACEMENT  2015    CARDIAC ELECTROPHYSIOLOGY PROCEDURE N/A 03/27/2024    Procedure: Ablation VT--DNS meds, schedule after cardiac MRI;  Surgeon: Robert Parra MD;  Location:  [facility];  Service: Cardiovascular;  Laterality: N/A;    COLONOSCOPY         Family History   Problem Relation Age of Onset    Colon cancer Mother     Heart disease Father     Heart attack Father     No Known Problems Sister     Alcohol abuse Paternal Uncle     Alcohol abuse Maternal Grandfather     Alzheimer's disease Paternal Grandmother        Social History     Socioeconomic History    Marital status:    Tobacco Use    Smoking status: Never     Passive exposure: Past    Smokeless tobacco: Never   Vaping Use    Vaping status: Never Used    Passive vaping exposure: Yes   Substance and Sexual Activity    Alcohol use: No    Drug use: No    Sexual activity: Yes     Partners: Female         Current Outpatient Medications:     Acetaminophen (TYLENOL  ARTHRITIS PAIN PO), Take 2 tablets by mouth As Needed (ARTHRITIS)., Disp: , Rfl:     albuterol sulfate  (90 Base) MCG/ACT inhaler, Inhale 2 puffs Every 4 (Four) Hours As Needed for Shortness of Air or Wheezing., Disp: , Rfl:     azelastine (ASTEPRO) 0.15 % solution nasal spray, 2 sprays into the nostril(s) as directed by provider Daily As Needed for Allergies., Disp: , Rfl:     dapagliflozin Propanediol 10 MG tablet, Take 5 mg by mouth Daily., Disp: , Rfl:     dofetilide (TIKOSYN) 500 MCG capsule, Take 1 capsule by mouth Every 12 (Twelve) Hours., Disp: 120 capsule, Rfl: 3    furosemide (LASIX) 20 MG tablet, Take 1 tablet by mouth Daily As Needed (lower extremity swelling, or weight gain >2 lbs/24 hours)., Disp: 30 tablet, Rfl: 0    hydrOXYzine (ATARAX) 25 MG tablet, Take 1 tablet by mouth Daily As Needed for Anxiety., Disp: 90 tablet, Rfl: 0    Klor-Con 20 MEQ packet, , Disp: , Rfl:     levocetirizine (XYZAL) 5 MG tablet, Take 1 tablet by mouth Every Evening., Disp: , Rfl:     Magnesium Oxide -Mg Supplement 400 (240 Mg) MG tablet, Take 1 tablet by mouth Daily., Disp: , Rfl:     metoprolol succinate XL (TOPROL-XL) 25 MG 24 hr tablet, Take 1 tablet by mouth Daily., Disp: 90 tablet, Rfl: 2    mexiletine (MEXITIL) 150 MG capsule, Take 1 capsule by mouth Every 8 (Eight) Hours., Disp: 270 capsule, Rfl: 1    montelukast (SINGULAIR) 10 MG tablet, Take 1 tablet by mouth Every Night., Disp: , Rfl:     multivitamin with minerals tablet tablet, Take 1 tablet by mouth Daily., Disp: , Rfl:     nitroglycerin (NITROSTAT) 0.4 MG SL tablet, Place 1 tablet under the tongue As Needed for Chest Pain., Disp: , Rfl:     omeprazole (priLOSEC) 20 MG capsule, Take 1 capsule by mouth As Needed (HEARTBURN)., Disp: , Rfl:     sacubitril-valsartan (ENTRESTO) 24-26 MG tablet, Take 1 tablet by mouth Every 12 (Twelve) Hours., Disp: 180 tablet, Rfl: 2    ALLERGY SERUM INJECTION, Inject  under the skin into the appropriate area as directed 1  "(One) Time.   (Patient not taking: Reported on 5/28/2024), Disp: , Rfl:     Allergies:   Allergies   Allergen Reactions    Amoxicillin Anaphylaxis     difficulting breathing       Objective   Vital Signs: Blood pressure 108/68, pulse 89, height 180.3 cm (71\"), weight 106 kg (233 lb), SpO2 96%.    PHYSICAL EXAM  General appearance: Awake, alert, cooperative  Head: Normocephalic, without obvious abnormality, atraumatic  Lungs: Clear to ascultation bilaterally  Heart: Regular rate and rhythm, no murmurs, no lower extremity swelling  Skin: Skin color, turgor normal, no rashes or lesions  Neurologic: Grossly normal     Lab Results   Component Value Date    GLUCOSE 94 05/01/2024    CALCIUM 9.2 05/01/2024     05/01/2024    K 3.7 05/01/2024    CO2 27.0 05/01/2024     05/01/2024    BUN 9 05/01/2024    CREATININE 0.83 05/01/2024    EGFRIFNONA 96 10/19/2021    BCR 10.8 05/01/2024    ANIONGAP 11.0 05/01/2024     Lab Results   Component Value Date    WBC 8.92 05/01/2024    HGB 17.1 05/01/2024    HCT 52.0 (H) 05/01/2024    MCV 88.4 05/01/2024     05/01/2024     No results found for: \"INR\", \"PROTIME\"  Lab Results   Component Value Date    TSH 1.360 04/26/2024          Results for orders placed during the hospital encounter of 04/26/24    Adult Transthoracic Echo Complete w/ Color, Spectral and Contrast if Necessary Per Protocol    Interpretation Summary    Left ventricular systolic function is severely decreased.  Estimated left ventricular ejection fraction appears to be 26 - 30% with Lumason contrast.    The left ventricular cavity is moderately dilated.    Left ventricular wall thickness is consistent with mild concentric hypertrophy.    The right atrial cavity is mild to moderately dilated.    Mild to moderate mitral valve regurgitation is present.    When compared to the echocardiogram performed in October 2023, there is no significant change.  The calculated EF on both ultrasounds are approximately the " same, but visually, the ejection fraction is estimated to be notably lower.     Results for orders placed during the hospital encounter of 10/19/21    Cardiac Catheterization/Vascular Study    Narrative  Table formatting from the original result was not included.  Images from the original result were not included.  [facility] [address], [address]  [place], [place], [address] [phone]  [email or web address]          CARDIAC CATHETERIZATION PROCEDURE NOTE    Impression  · Normal coronary arteries.  · Nonischemic cardiomyopathy, LVEF 30-35% with severe global hypokinesis.  · LVEDP 14 mmHg.    RECOMMENDATIONS:  · Continue guideline directed medical therapy with carvedilol and Entresto  · Start Jardiance 10 mg daily  · Consider adding spironolactone in the near future if blood pressure will tolerate.    ----------------------------------------------------------------------------------------------------------------------    Indication(s) for this Procedure:  Ventricular fibrillation with known cardiomyopathy.    Procedure(s) Performed:  1.  Right radial artery access .  2. Selective coronary angiography  3. Left heart catheterization.  4. Left ventriculography.    Description of the Procedure:  Informed consent was obtained with the goals, rationale, alternatives, risks and benefits of the procedure explained to the patient.  A 6Fr Terumo slender sheath was placed in the right radial artery. Selective angiography of the right coronary artery was performed with a 5Fr JR4 diagnostic catheter.  Selective angiography of the left coronary arteries was performed with a 5Fr JL3.5 diagnostic catheter.  Left heart catheterization with left ventriculography was performed with a 5Fr pigtail catheter placed in the left ventricle.  The procedure was completed and the sheath was removed. A radial patent hemostasis band was placed for hemostasis.      Angiographic Findings:  Right coronary  dominant circulation  · Left main artery:   Large-caliber vessel trifurcates into an LAD, ramus, and circumflex, normal.  · Left anterior descending artery: Large caliber vessel gives rise to several small diagonals, normal.  · Left circumflex artery: Large-caliber nondominant vessel gives rise to one moderate OM branch, then continues in the AV groove to supply several small posterior lateral branches, normal  · Ramus intermedius: Large-caliber, normal.  · Right coronary artery: Large-caliber dominant, gives rise to PDA to PLV, normal.    Left Ventricle: LVEF 30-35% with severe global hypokinesis.    Hemodynamic Findings:  · Ao pressure: 81/56 mmHg  · LVEDP: 14 mmHg  · LV to Ao pullback peak to peak gradient: 0 mmHg    Estimated Blood Loss: Minimal    Specimen(s): None obtained    Sheath: Removed, radial patent hemostasis band was placed for hemostasis.    Complications: There were no apparent early complications.    Shankar Sanchez MD, [facility]  Interventional Cardiology  [facility] personally viewed and interpreted the patient's EKG/Telemetry/lab data      ECG 12 Lead    Date/Time: 5/28/2024 2:10 PM  Performed by: Barrett Ames PA-C    Authorized by: Barrett Ames PA-C  Comparison: compared with previous ECG from 5/1/2024  Similar to previous ECG  Rhythm: sinus rhythm  Ectopy: unifocal PVCs  Rate: normal  BPM: 89  Other findings: right atrial abnormality    Clinical impression: normal ECG          David Ravi  reports that he has never smoked. He has been exposed to tobacco smoke. He has never used smokeless tobacco.          Advance Care Planning   Advance Care Planning: ACP discussion was declined by the patient. Patient does not have an advance directive, declines further assistance.     Assessment & Plan    1. Nonischemic cardiomyopathy  S/p single chamber ICD, BSC  Follows with Dr. Newton for heart failure  TTE 4/2024 with LVEF 26-30%. Cardiac MRI from March  showed no LGE but the report was admittedly suboptimal for assessment of LV scar.    We will make sure advanced heart failure referral in place without issue as this is the next step in the patient's management. I will keep looking for adequate resources for post ICD therapy-related anxiety.    Patient could definitely benefit from cardiac rehab so I will refer him for this at [facility] as well.    2. Ventricular arrhythmia  Recent admission [facility] for ventricular arrhythmias, now on Tikosyn and mexiletine for ventricular arrhythmia suppression. This cocktail has worked well with no recurrence of ventricular arrhythmia on device since his admission.    3. ICD (implantable cardioverter-defibrillator) discharge  The patient single-chamber [employer] ICD was interrogated today demonstrating normal threshold and impedance values, 7.5 years left on the battery, <1% ventricular pacing at rate 40, and most importantly no events or reprogramming.    4. Longterm Current Use of Antiarrhythmic Medical Therapy  EKG acceptable Qtc to day. Continue Tikosyn    Follow Up:  Return in about 3 months (around 8/28/2024).      Thank you for allowing me to participate in the care of your patient. Please do not hesitate to contact me with additional questions or concerns.      Barrett Ames PA-C  Cardiac Electrophysiology  [facility] / [facility]

## 2024-05-29 PROBLEM — Z79.899 LONG TERM CURRENT USE OF ANTIARRHYTHMIC MEDICAL THERAPY: Status: ACTIVE | Noted: 2024-05-29

## 2024-06-06 RX ORDER — DOFETILIDE 0.5 MG/1
500 CAPSULE ORAL EVERY 12 HOURS SCHEDULED
Qty: 180 CAPSULE | Refills: 2 | Status: SHIPPED | OUTPATIENT
Start: 2024-06-06

## 2024-07-25 ENCOUNTER — TELEPHONE (OUTPATIENT)
Dept: CARDIOLOGY | Facility: CLINIC | Age: 54
End: 2024-07-25
Payer: MEDICARE

## 2024-07-25 NOTE — TELEPHONE ENCOUNTER
Pt called to report concern about elevated heart rate for the last several days. Assisted pt w/manual [employer] cardiac device transmission.     Pt has a single chamber ICD. See heart rate histogram.   Reassured pt his heart rates are within normal range. Pt reports compliance with all cardiac Rx.

## 2024-08-08 ENCOUNTER — TELEPHONE (OUTPATIENT)
Dept: CARDIOLOGY | Facility: CLINIC | Age: 54
End: 2024-08-08
Payer: MEDICARE

## 2024-08-08 NOTE — TELEPHONE ENCOUNTER
Mr Ravi called to say he had an episode last night where he got dizzy and felt like he was going to pass out. He was putting his shoes in the closet at this time. His merlin monitor did sent an alert of VT and VF. Reading is in [employer] with full report.

## 2024-08-09 RX ORDER — MEXILETINE HYDROCHLORIDE 200 MG/1
200 CAPSULE ORAL 3 TIMES DAILY
Qty: 90 CAPSULE | Refills: 6 | Status: SHIPPED | OUTPATIENT
Start: 2024-08-09

## 2024-09-03 ENCOUNTER — OFFICE VISIT (OUTPATIENT)
Dept: CARDIOLOGY | Facility: CLINIC | Age: 54
End: 2024-09-03
Payer: MEDICARE

## 2024-09-03 VITALS
OXYGEN SATURATION: 97 % | SYSTOLIC BLOOD PRESSURE: 128 MMHG | BODY MASS INDEX: 31.5 KG/M2 | WEIGHT: 225 LBS | DIASTOLIC BLOOD PRESSURE: 88 MMHG | HEART RATE: 67 BPM | HEIGHT: 71 IN

## 2024-09-03 DIAGNOSIS — Z45.02 ICD (IMPLANTABLE CARDIOVERTER-DEFIBRILLATOR) DISCHARGE: ICD-10-CM

## 2024-09-03 DIAGNOSIS — I42.8 NONISCHEMIC CARDIOMYOPATHY: Primary | ICD-10-CM

## 2024-09-03 RX ORDER — SPIRONOLACTONE 25 MG/1
TABLET ORAL
COMMUNITY
Start: 2024-08-27

## 2024-09-03 NOTE — PROGRESS NOTES
Cardiac Electrophysiology Outpatient Note  [facility] at [facility]    Office Visit     David Ravi  [id number]  05/28/2024    Primary Care Physician: Jayant Newton MD    Referred By: Provider, No Known    Subjective     Chief Complaint   Patient presents with    Ventricular arrhythmia    ICD (implantable cardioverter-defibrillator), dual, in situ    Nonischemic cardiomyopathy    Dyspnea on exertion     UPDATED PROBLEM LIST:  Nonischemic cardiomyopathy complicated by ventricular tachycardia   Left heart catheterization, circa 2000 by Rohan Bautista MD, showed normal coronary arteries.  Echocardiogram, 08/20/2015, showed moderate global hypokinesis, ejection fraction of 25% to 30%.  GXT myocardial perfusion, 08/25/2015, showed dilated  LV; no reversible ischemia.   Left heart catheterization, 09/08/2015, Dr. Rohan Bautista: Normal coronary arteries, EF 30%. Placement of LifeVest.  [facility], 10/19/21, normal coronaries. EF 30-35% with severe global hypokinesis. Initiated on jardiance.   Status post [employer] single chamber ICD implantation, November 2015.   TTE 10/2023: LVEF 43% no VHD  Cardiac MRI 3/2024: LVEF 42% with regional akinesis in the basal inferolateral wall, no LGE visualized but LV scar assessment suboptimal due to cardiac device  VT RFA by Dr. Parra 3/27/24  TTE 4/28/2024: LVEF 26-30%, Mild to moderate MR, no significant change from 10/2023 study  Tikosyn initiation 4/5/24, then mexiletine 4/30/2024  Obesity, BMI 35.   Obstructive sleep apnea with CPAP.   Asthma.   GERD.   History of vertigo.    History of Present Illness:   David Ravi is a 54 y.o. male who presents to my electrophysiology clinic for follow up of nonischemic cardiomyopathy and ventricular tachycardia s/p ICD and VT ablation 3/2024 by Dr. Parra now on Tikosyn and mexiletine.     He is overall doing well.  He continues to have a fair amount of anxiety regarding ICD shocks.  He had 1  episode that required ATP from his device but did not require shock.  His mexiletine was increased after this and he is not have recurrent episodes since then.  He supposed to start with cardiac rehab.  Past Medical History:   Diagnosis Date    Arrhythmia     Asthma     CHF NYHA class I     Coronary artery disease 2000    GERD (gastroesophageal reflux disease)     Heart disease     History of vertigo     Hyperlipidemia     Nonischemic cardiomyopathy     Obesity     IRINA on CPAP     Ventricular arrhythmia 01/30/2024       Past Surgical History:   Procedure Laterality Date    ABLATION OF DYSRHYTHMIC FOCUS      CARDIAC CATHETERIZATION      CARDIAC CATHETERIZATION Left 10/19/2021    Procedure: Left Heart Cath;  Surgeon: Shankar Sanchez MD;  Location:  [facility];  Service: Cardiovascular;  Laterality: Left;    CARDIAC DEFIBRILLATOR PLACEMENT  2015    CARDIAC ELECTROPHYSIOLOGY PROCEDURE N/A 03/27/2024    Procedure: Ablation VT--DNS meds, schedule after cardiac MRI;  Surgeon: Robert Parra MD;  Location:  [facility];  Service: Cardiovascular;  Laterality: N/A;    COLONOSCOPY         Family History   Problem Relation Age of Onset    Colon cancer Mother     Heart disease Father     Heart attack Father     No Known Problems Sister     Alcohol abuse Paternal Uncle     Alcohol abuse Maternal Grandfather     Alzheimer's disease Paternal Grandmother        Social History     Socioeconomic History    Marital status:    Tobacco Use    Smoking status: Never     Passive exposure: Past    Smokeless tobacco: Never   Vaping Use    Vaping status: Never Used    Passive vaping exposure: Yes   Substance and Sexual Activity    Alcohol use: No    Drug use: No    Sexual activity: Not Currently     Partners: Female         Current Outpatient Medications:     Acetaminophen (TYLENOL ARTHRITIS PAIN PO), Take 2 tablets by mouth As Needed (ARTHRITIS)., Disp: , Rfl:     albuterol sulfate  (90 Base)  MCG/ACT inhaler, Inhale 2 puffs Every 4 (Four) Hours As Needed for Shortness of Air or Wheezing., Disp: , Rfl:     azelastine (ASTEPRO) 0.15 % solution nasal spray, 2 sprays into the nostril(s) as directed by provider Daily As Needed for Allergies., Disp: , Rfl:     dapagliflozin Propanediol 10 MG tablet, Take 5 mg by mouth Daily., Disp: , Rfl:     dofetilide (TIKOSYN) 500 MCG capsule, Take 1 capsule by mouth Every 12 (Twelve) Hours., Disp: 180 capsule, Rfl: 2    levocetirizine (XYZAL) 5 MG tablet, Take 1 tablet by mouth Every Evening., Disp: , Rfl:     Magnesium Oxide -Mg Supplement 400 (240 Mg) MG tablet, Take 1 tablet by mouth Daily., Disp: , Rfl:     metoprolol succinate XL (TOPROL-XL) 25 MG 24 hr tablet, Take 1 tablet by mouth Daily., Disp: 90 tablet, Rfl: 2    mexiletine (MEXITIL) 200 MG capsule, Take 1 capsule by mouth 3 (Three) Times a Day., Disp: 90 capsule, Rfl: 6    montelukast (SINGULAIR) 10 MG tablet, Take 1 tablet by mouth Every Night., Disp: , Rfl:     multivitamin with minerals tablet tablet, Take 1 tablet by mouth Daily., Disp: , Rfl:     omeprazole (priLOSEC) 20 MG capsule, Take 1 capsule by mouth As Needed (HEARTBURN)., Disp: , Rfl:     sacubitril-valsartan (ENTRESTO) 24-26 MG tablet, Take 1 tablet by mouth Every 12 (Twelve) Hours., Disp: 180 tablet, Rfl: 2    spironolactone (ALDACTONE) 25 MG tablet, , Disp: , Rfl:     furosemide (LASIX) 20 MG tablet, Take 1 tablet by mouth Daily As Needed (lower extremity swelling, or weight gain >2 lbs/24 hours). (Patient not taking: Reported on 9/3/2024), Disp: 30 tablet, Rfl: 0    hydrOXYzine (ATARAX) 25 MG tablet, Take 1 tablet by mouth Daily As Needed for Anxiety. (Patient not taking: Reported on 9/3/2024), Disp: 90 tablet, Rfl: 0    Klor-Con 20 MEQ packet, , Disp: , Rfl:     Allergies:   Allergies   Allergen Reactions    Amoxicillin Anaphylaxis     difficulting breathing       Objective   Vital Signs: Blood pressure 128/88, pulse 67, height 180.3 cm  "(70.98\"), weight 102 kg (225 lb), SpO2 97%.    PHYSICAL EXAM  General appearance: Awake, alert, cooperative  Head: Normocephalic, without obvious abnormality, atraumatic  Lungs: Clear to ascultation bilaterally  Heart: Regular rate and rhythm, no murmurs, no lower extremity swelling  Skin: Skin color, turgor normal, no rashes or lesions  Neurologic: Grossly normal     Lab Results   Component Value Date    GLUCOSE 94 05/01/2024    CALCIUM 9.2 05/01/2024     05/01/2024    K 3.7 05/01/2024    CO2 27.0 05/01/2024     05/01/2024    BUN 9 05/01/2024    CREATININE 0.83 05/01/2024    EGFRIFNONA 96 10/19/2021    BCR 10.8 05/01/2024    ANIONGAP 11.0 05/01/2024     Lab Results   Component Value Date    WBC 8.92 05/01/2024    HGB 17.1 05/01/2024    HCT 52.0 (H) 05/01/2024    MCV 88.4 05/01/2024     05/01/2024     No results found for: \"INR\", \"PROTIME\"  Lab Results   Component Value Date    TSH 1.360 04/26/2024          Results for orders placed during the hospital encounter of 04/26/24    Adult Transthoracic Echo Complete w/ Color, Spectral and Contrast if Necessary Per Protocol    Interpretation Summary    Left ventricular systolic function is severely decreased.  Estimated left ventricular ejection fraction appears to be 26 - 30% with Lumason contrast.    The left ventricular cavity is moderately dilated.    Left ventricular wall thickness is consistent with mild concentric hypertrophy.    The right atrial cavity is mild to moderately dilated.    Mild to moderate mitral valve regurgitation is present.    When compared to the echocardiogram performed in October 2023, there is no significant change.  The calculated EF on both ultrasounds are approximately the same, but visually, the ejection fraction is estimated to be notably lower.     Results for orders placed during the hospital encounter of 10/19/21    Cardiac Catheterization/Vascular Study    Narrative  Table formatting from the original result was not " included.  Images from the original result were not included.  [facility] [address], [address]  [place], [place], [id number] [phone]  WWW[facility]          CARDIAC CATHETERIZATION PROCEDURE NOTE    Impression  · Normal coronary arteries.  · Nonischemic cardiomyopathy, LVEF 30-35% with severe global hypokinesis.  · LVEDP 14 mmHg.    RECOMMENDATIONS:  · Continue guideline directed medical therapy with carvedilol and Entresto  · Start Jardiance 10 mg daily  · Consider adding spironolactone in the near future if blood pressure will tolerate.    ----------------------------------------------------------------------------------------------------------------------    Indication(s) for this Procedure:  Ventricular fibrillation with known cardiomyopathy.    Procedure(s) Performed:  1.  Right radial artery access .  2. Selective coronary angiography  3. Left heart catheterization.  4. Left ventriculography.    Description of the Procedure:  Informed consent was obtained with the goals, rationale, alternatives, risks and benefits of the procedure explained to the patient.  A 6Fr Terumo slender sheath was placed in the right radial artery. Selective angiography of the right coronary artery was performed with a 5Fr JR4 diagnostic catheter.  Selective angiography of the left coronary arteries was performed with a 5Fr JL3.5 diagnostic catheter.  Left heart catheterization with left ventriculography was performed with a 5Fr pigtail catheter placed in the left ventricle.  The procedure was completed and the sheath was removed. A radial patent hemostasis band was placed for hemostasis.      Angiographic Findings:  Right coronary dominant circulation  · Left main artery:   Large-caliber vessel trifurcates into an LAD, ramus, and circumflex, normal.  · Left anterior descending artery: Large caliber vessel gives rise to several small diagonals, normal.  · Left circumflex artery:  Large-caliber nondominant vessel gives rise to one moderate OM branch, then continues in the AV groove to supply several small posterior lateral branches, normal  · Ramus intermedius: Large-caliber, normal.  · Right coronary artery: Large-caliber dominant, gives rise to PDA to PLV, normal.    Left Ventricle: LVEF 30-35% with severe global hypokinesis.    Hemodynamic Findings:  · Ao pressure: 81/56 mmHg  · LVEDP: 14 mmHg  · LV to Ao pullback peak to peak gradient: 0 mmHg    Estimated Blood Loss: Minimal    Specimen(s): None obtained    Sheath: Removed, radial patent hemostasis band was placed for hemostasis.    Complications: There were no apparent early complications.    Shankar Sanchez MD, [facility]  Interventional Cardiology  [facility] at [facility] personally viewed and interpreted the patient's EKG/Telemetry/lab data      ECG 12 Lead    Date/Time: 9/3/2024 4:46 PM  Performed by: Robert Parra MD    Authorized by: Robert Parra MD  Comparison: compared with previous ECG from 5/28/2024  Similar to previous ECG  Comments: Sinus rhythm, QTc 462.          David Ravi  reports that he has never smoked. He has been exposed to tobacco smoke. He has never used smokeless tobacco.          Advance Care Planning   Advance Care Planning: ACP discussion was declined by the patient. Patient does not have an advance directive, declines further assistance.     Assessment & Plan    1. Nonischemic cardiomyopathy  S/p single chamber ICD, [facility] [place] with Dr. Newton for heart failure.  TTE 4/2024 with LVEF 26-30%. Cardiac MRI from March showed no LGE but the report was admittedly suboptimal for assessment of LV scar.  EF of this was 42%.    He recently establish care with advanced heart failure at .  Currently he is not a candidate for advanced therapies, but should his arrhythmias persist and be unable to be controlled this may be needed.    2. Ventricular arrhythmia  He had a single recurrent  episode of fast VT in July after which we increased his mexiletine to 200 mg 3 times daily.  Should he have recurrent events we will consider switching to amiodarone versus repeat catheter ablation.  Exiletine to 200 mg twice daily.  None since then.  Will monitor closely.      3. ICD (implantable cardioverter-defibrillator) discharge  The patient single-chamber [employer] ICD was interrogated today demonstrating normal threshold and impedance values, 7.5 years left on the battery, <1% ventricular pacing at rate 40, and most importantly no events or reprogramming.    4. Longterm Current Use of Antiarrhythmic Medical Therapy  EKG acceptable Qtc to day. Continue Tikosyn    Follow Up:  No follow-ups on file.      Thank you for allowing me to participate in the care of your patient. Please do not hesitate to contact me with additional questions or concerns.      Robert Parra MD  Cardiac Electrophysiology  [facility] / [facility]

## 2024-09-03 NOTE — PROGRESS NOTES
I called [facility] and they sent a message to Dr. Alvarez with the information that Dr. Parra would like the patient to see psychiatry through the heart failure clinic for PTSD related to the device. They advised me that Dr. Alvarez may need to place an internal referral but their office would work on getting the patient scheduled.

## 2024-10-10 ENCOUNTER — TELEPHONE (OUTPATIENT)
Dept: CARDIOLOGY | Facility: CLINIC | Age: 54
End: 2024-10-10
Payer: MEDICARE

## 2024-10-10 NOTE — TELEPHONE ENCOUNTER
Pt called stating he believes he missed his afternoon dose of mexilitine but he isn't 100% certain. I advised the patient to just take his evening dose a few hours earlier and resume his normal schedule tomorrow. Pt was agreeable.

## 2024-11-07 ENCOUNTER — TELEPHONE (OUTPATIENT)
Dept: CARDIOLOGY | Facility: CLINIC | Age: 54
End: 2024-11-07
Payer: MEDICARE

## 2024-11-07 PROCEDURE — 93295 DEV INTERROG REMOTE 1/2/MLT: CPT | Performed by: STUDENT IN AN ORGANIZED HEALTH CARE EDUCATION/TRAINING PROGRAM

## 2024-11-07 PROCEDURE — 93296 REM INTERROG EVL PM/IDS: CPT | Performed by: STUDENT IN AN ORGANIZED HEALTH CARE EDUCATION/TRAINING PROGRAM

## 2024-11-07 NOTE — TELEPHONE ENCOUNTER
Left message for patient letting him know that his bedside home monitor isn't connecting. Asked him to make sure the monitor is plugged in and to try to send in a manual reading. Left my name and number should he need assistance.

## 2024-12-17 ENCOUNTER — OFFICE VISIT (OUTPATIENT)
Dept: CARDIOLOGY | Facility: CLINIC | Age: 54
End: 2024-12-17
Payer: MEDICARE

## 2024-12-17 VITALS
DIASTOLIC BLOOD PRESSURE: 76 MMHG | OXYGEN SATURATION: 96 % | SYSTOLIC BLOOD PRESSURE: 122 MMHG | WEIGHT: 231.6 LBS | BODY MASS INDEX: 32.42 KG/M2 | HEART RATE: 79 BPM | HEIGHT: 71 IN

## 2024-12-17 DIAGNOSIS — I47.20 V-TACH: ICD-10-CM

## 2024-12-17 DIAGNOSIS — I42.8 NONISCHEMIC CARDIOMYOPATHY: ICD-10-CM

## 2024-12-17 DIAGNOSIS — Z95.810 CARDIAC DEFIBRILLATOR IN PLACE: Primary | ICD-10-CM

## 2024-12-17 RX ORDER — DOFETILIDE 0.5 MG/1
500 CAPSULE ORAL EVERY 12 HOURS SCHEDULED
Qty: 180 CAPSULE | Refills: 3 | Status: SHIPPED | OUTPATIENT
Start: 2024-12-17

## 2024-12-17 RX ORDER — MEXILETINE HYDROCHLORIDE 200 MG/1
200 CAPSULE ORAL 3 TIMES DAILY
Qty: 270 CAPSULE | Refills: 3 | Status: SHIPPED | OUTPATIENT
Start: 2024-12-17

## 2024-12-17 NOTE — PROGRESS NOTES
Cardiac Electrophysiology Outpatient Note  [facility]    Office Visit     David Ravi  [id number]  05/28/2024    Primary Care Physician: Jayant Newton MD    Referred By: No ref. provider found    Subjective     Chief Complaint   Patient presents with    Nonischemic cardiomyopathy    Ventricular arrhythmia    ICD (implantable cardioverter-defibrillator), dual, in situ     UPDATED PROBLEM LIST:  Nonischemic cardiomyopathy complicated by ventricular tachycardia   Left heart catheterization, circa 2000 by Rohan Bautista MD, showed normal coronary arteries.  Echocardiogram, 08/20/2015, showed moderate global hypokinesis, ejection fraction of 25% to 30%.  GXT myocardial perfusion, 08/25/2015, showed dilated  LV; no reversible ischemia.   Left heart catheterization, 09/08/2015, Dr. Rohan Bautista: Normal coronary arteries, EF 30%. Placement of LifeVest.  [facility], 10/19/21, normal coronaries. EF 30-35% with severe global hypokinesis. Initiated on jardiance.   Status post [employer] single chamber ICD implantation, November 2015.   TTE 10/2023: LVEF 43% no VHD  Cardiac MRI 3/2024: LVEF 42% with regional akinesis in the basal inferolateral wall, no LGE visualized but LV scar assessment suboptimal due to cardiac device  VT RFA by Dr. Parra 3/27/24  TTE 4/28/2024: LVEF 26-30%, Mild to moderate MR, no significant change from 10/2023 study  Tikosyn initiation 4/5/24, then mexiletine 4/30/2024  Obesity, BMI 35.   Obstructive sleep apnea with CPAP.   Asthma.   GERD.   History of vertigo.    History of Present Illness:   David Ravi is a 54 y.o. male who presents to my electrophysiology clinic for follow up of nonischemic cardiomyopathy and ventricular tachycardia s/p ICD and VT ablation 3/2024 by Dr. Parra.  He did have some recurrences afterwards and is now on Tikosyn and mexiletine.  He is referred to [facility] advanced heart failure and is not currently a candidate for  advanced therapies.  He underwent a CPET with max VO2 of 17.    He is overall done well since his last visit.  No significant problems.  Continues to have some occasional shortness of breath and dizziness but this has not been a limitation to him.  No new episodes from his ICD as far as he can tell.  Completed cardiac rehab.      Past Medical History:   Diagnosis Date    Arrhythmia     Asthma     CHF NYHA class I     Coronary artery disease 2000    GERD (gastroesophageal reflux disease)     Heart disease     History of vertigo     Hyperlipidemia     Nonischemic cardiomyopathy     Obesity     IRINA on CPAP     Ventricular arrhythmia 01/30/2024       Past Surgical History:   Procedure Laterality Date    ABLATION OF DYSRHYTHMIC FOCUS      CARDIAC CATHETERIZATION      CARDIAC CATHETERIZATION Left 10/19/2021    Procedure: Left Heart Cath;  Surgeon: Shankar Sanchez MD;  Location:  [facility];  Service: Cardiovascular;  Laterality: Left;    CARDIAC DEFIBRILLATOR PLACEMENT  2015    CARDIAC ELECTROPHYSIOLOGY PROCEDURE N/A 03/27/2024    Procedure: Ablation VT--DNS meds, schedule after cardiac MRI;  Surgeon: Robert Parra MD;  Location: [facility];  Service: Cardiovascular;  Laterality: N/A;    COLONOSCOPY         Family History   Problem Relation Age of Onset    Colon cancer Mother     Heart disease Father     Heart attack Father     No Known Problems Sister     Alcohol abuse Paternal Uncle     Alcohol abuse Maternal Grandfather     Alzheimer's disease Paternal Grandmother        Social History     Socioeconomic History    Marital status:    Tobacco Use    Smoking status: Never     Passive exposure: Past    Smokeless tobacco: Never   Vaping Use    Vaping status: Never Used    Passive vaping exposure: Yes   Substance and Sexual Activity    Alcohol use: No    Drug use: No    Sexual activity: Not Currently     Partners: Female         Current Outpatient Medications:     Acetaminophen  "(TYLENOL ARTHRITIS PAIN PO), Take 2 tablets by mouth As Needed (ARTHRITIS)., Disp: , Rfl:     albuterol sulfate  (90 Base) MCG/ACT inhaler, Inhale 2 puffs Every 4 (Four) Hours As Needed for Shortness of Air or Wheezing., Disp: , Rfl:     azelastine (ASTEPRO) 0.15 % solution nasal spray, Administer 2 sprays into the nostril(s) as directed by provider Daily As Needed for Allergies., Disp: , Rfl:     dapagliflozin Propanediol 10 MG tablet, Take 5 mg by mouth Daily., Disp: , Rfl:     dofetilide (TIKOSYN) 500 MCG capsule, Take 1 capsule by mouth Every 12 (Twelve) Hours., Disp: 180 capsule, Rfl: 3    hydrOXYzine (ATARAX) 25 MG tablet, Take 1 tablet by mouth Daily As Needed for Anxiety., Disp: 90 tablet, Rfl: 0    levocetirizine (XYZAL) 5 MG tablet, Take 1 tablet by mouth Every Evening., Disp: , Rfl:     Magnesium Oxide -Mg Supplement 400 (240 Mg) MG tablet, Take 1 tablet by mouth Daily., Disp: , Rfl:     melatonin 3 MG tablet, Take 1 tablet by mouth Daily., Disp: , Rfl:     metoprolol succinate XL (TOPROL-XL) 25 MG 24 hr tablet, Take 1 tablet by mouth Daily., Disp: 90 tablet, Rfl: 2    mexiletine (MEXITIL) 200 MG capsule, Take 1 capsule by mouth 3 (Three) Times a Day., Disp: 270 capsule, Rfl: 3    montelukast (SINGULAIR) 10 MG tablet, Take 1 tablet by mouth Every Night., Disp: , Rfl:     multivitamin with minerals tablet tablet, Take 1 tablet by mouth Daily., Disp: , Rfl:     omeprazole (priLOSEC) 20 MG capsule, Take 1 capsule by mouth As Needed (HEARTBURN)., Disp: , Rfl:     sacubitril-valsartan (ENTRESTO) 24-26 MG tablet, Take 1 tablet by mouth Every 12 (Twelve) Hours., Disp: 180 tablet, Rfl: 2    spironolactone (ALDACTONE) 25 MG tablet, , Disp: , Rfl:     Allergies:   Allergies   Allergen Reactions    Amoxicillin Anaphylaxis     difficulting breathing       Objective   Vital Signs: Blood pressure 122/76, pulse 79, height 180.3 cm (70.98\"), weight 105 kg (231 lb 9.6 oz), SpO2 96%.    PHYSICAL EXAM  General " "appearance: Awake, alert, cooperative  Head: Normocephalic, without obvious abnormality, atraumatic  Lungs: Clear to ascultation bilaterally  Heart: Regular rate and rhythm, no murmurs, no lower extremity swelling  Skin: Skin color, turgor normal, no rashes or lesions  Neurologic: Grossly normal     Lab Results   Component Value Date    GLUCOSE 94 05/01/2024    CALCIUM 9.2 05/01/2024     05/01/2024    K 3.7 05/01/2024    CO2 27.0 05/01/2024     05/01/2024    BUN 9 05/01/2024    CREATININE 0.83 05/01/2024    EGFRIFNONA 96 10/19/2021    BCR 10.8 05/01/2024    ANIONGAP 11.0 05/01/2024     Lab Results   Component Value Date    WBC 8.92 05/01/2024    HGB 17.1 05/01/2024    HCT 52.0 (H) 05/01/2024    MCV 88.4 05/01/2024     05/01/2024     No results found for: \"INR\", \"PROTIME\"  Lab Results   Component Value Date    TSH 1.360 04/26/2024          Results for orders placed during the hospital encounter of 04/26/24    Adult Transthoracic Echo Complete w/ Color, Spectral and Contrast if Necessary Per Protocol    Interpretation Summary    Left ventricular systolic function is severely decreased.  Estimated left ventricular ejection fraction appears to be 26 - 30% with Lumason contrast.    The left ventricular cavity is moderately dilated.    Left ventricular wall thickness is consistent with mild concentric hypertrophy.    The right atrial cavity is mild to moderately dilated.    Mild to moderate mitral valve regurgitation is present.    When compared to the echocardiogram performed in October 2023, there is no significant change.  The calculated EF on both ultrasounds are approximately the same, but visually, the ejection fraction is estimated to be notably lower.     Results for orders placed during the hospital encounter of 10/19/21    Cardiac Catheterization/Vascular Study    Narrative  Table formatting from the original result was not included.  Images from the original result were not " included.  [facility] [address], [address]  [place], [place], [id number] [phone]  [email or web address] [email or web address] CenterNan[facility]arameceuticalsSc[facility]          CARDIAC CATHETERIZATION PROCEDURE NOTE    Impression  · Normal coronary arteries.  · Nonischemic cardiomyopathy, LVEF 30-35% with severe global hypokinesis.  · LVEDP 14 mmHg.    RECOMMENDATIONS:  · Continue guideline directed medical therapy with carvedilol and Entresto  · Start Jardiance 10 mg daily  · Consider adding spironolactone in the near future if blood pressure will tolerate.    ----------------------------------------------------------------------------------------------------------------------    Indication(s) for this Procedure:  Ventricular fibrillation with known cardiomyopathy.    Procedure(s) Performed:  1.  Right radial artery access .  2. Selective coronary angiography  3. Left heart catheterization.  4. Left ventriculography.    Description of the Procedure:  Informed consent was obtained with the goals, rationale, alternatives, risks and benefits of the procedure explained to the patient.  A 6Fr Terumo slender sheath was placed in the right radial artery. Selective angiography of the right coronary artery was performed with a 5Fr JR4 diagnostic catheter.  Selective angiography of the left coronary arteries was performed with a 5Fr JL3.5 diagnostic catheter.  Left heart catheterization with left ventriculography was performed with a 5Fr pigtail catheter placed in the left ventricle.  The procedure was completed and the sheath was removed. A radial patent hemostasis band was placed for hemostasis.      Angiographic Findings:  Right coronary dominant circulation  · Left main artery:   Large-caliber vessel trifurcates into an LAD, ramus, and circumflex, normal.  · Left anterior descending artery: Large caliber vessel gives rise to several small diagonals, normal.  · Left circumflex artery: Large-caliber nondominant vessel gives rise to one  moderate OM branch, then continues in the AV groove to supply several small posterior lateral branches, normal  · Ramus intermedius: Large-caliber, normal.  · Right coronary artery: Large-caliber dominant, gives rise to PDA to PLV, normal.    Left Ventricle: LVEF 30-35% with severe global hypokinesis.    Hemodynamic Findings:  · Ao pressure: 81/56 mmHg  · LVEDP: 14 mmHg  · LV to Ao pullback peak to peak gradient: 0 mmHg    Estimated Blood Loss: Minimal    Specimen(s): None obtained    Sheath: Removed, radial patent hemostasis band was placed for hemostasis.    Complications: There were no apparent early complications.    Shankar Sanchez MD, [facility]  Interventional Cardiology  [facility] personally viewed and interpreted the patient's EKG/Telemetry/lab data      ECG 12 Lead    Date/Time: 12/17/2024 2:14 PM  Performed by: Robert Parra MD    Authorized by: Robert Parra MD  Comparison: compared with previous ECG from 9/3/2024  Similar to previous ECG  Comments: Normal sinus rhythm without significant abnormality, QTc 467          David Ravi  reports that he has never smoked. He has been exposed to tobacco smoke. He has never used smokeless tobacco.          Advance Care Planning   Advance Care Planning: ACP discussion was declined by the patient. Patient does not have an advance directive, declines further assistance.     Assessment & Plan    1. Nonischemic cardiomyopathy  S/p single chamber ICD, BSC Follows with Dr. Newton for heart failure.  TTE 4/2024 with LVEF 26-30%. Cardiac MRI from March showed no LGE but the report was admittedly suboptimal for assessment of LV scar.  EF of this was 42%.  Was also seen by advanced heart [facility].  CPET showed VO2 max of 17. Currently he is not a candidate for advanced therapies, but should his arrhythmias persist and be unable to be controlled this may be needed.    2. Ventricular arrhythmia  Status post ablation in February  2024.  This consisted of mostly substrate modification in the basal lateral left ventricle.  He did have some recurrences of VT and is now on Tikosyn we will continue to monitor closely.  Did discuss and mexiletine without any recurrence.  May have to consider repeat ablation if he has recurrent VT.    3. ICD (implantable cardioverter-defibrillator) discharge  The patient single-chamber [employer] ICD was interrogated today demonstrating normal threshold and impedance values, 7 years left on the battery, <1% ventricular pacing at rate 40, and most importantly no events or reprogramming.    4. Longterm Current Use of Antiarrhythmic Medical Therapy  EKG acceptable Qtc to day. Continue Tikosyn    Follow Up:  No follow-ups on file.      Thank you for allowing me to participate in the care of your patient. Please do not hesitate to contact me with additional questions or concerns.      Robert Parra MD  Cardiac Electrophysiology  [facility] / [facility]

## 2025-02-06 PROCEDURE — 93295 DEV INTERROG REMOTE 1/2/MLT: CPT | Performed by: STUDENT IN AN ORGANIZED HEALTH CARE EDUCATION/TRAINING PROGRAM

## 2025-02-06 PROCEDURE — 93296 REM INTERROG EVL PM/IDS: CPT | Performed by: STUDENT IN AN ORGANIZED HEALTH CARE EDUCATION/TRAINING PROGRAM

## 2025-06-13 ENCOUNTER — TELEPHONE (OUTPATIENT)
Dept: CARDIOLOGY | Facility: CLINIC | Age: 55
End: 2025-06-13
Payer: MEDICARE

## 2025-06-13 NOTE — TELEPHONE ENCOUNTER
[facility] called and states patient is taking mexiletine 200 mg TID and Tikosyn 500 mcg BID. They state there is an interaction between the two and wanted to make the physician aware.     Please advise.

## 2025-07-01 ENCOUNTER — OFFICE VISIT (OUTPATIENT)
Dept: CARDIOLOGY | Facility: CLINIC | Age: 55
End: 2025-07-01
Payer: MEDICARE

## 2025-07-01 VITALS
OXYGEN SATURATION: 95 % | SYSTOLIC BLOOD PRESSURE: 116 MMHG | DIASTOLIC BLOOD PRESSURE: 82 MMHG | HEART RATE: 72 BPM | BODY MASS INDEX: 34.15 KG/M2 | WEIGHT: 243.9 LBS | HEIGHT: 71 IN

## 2025-07-01 DIAGNOSIS — I42.8 NONISCHEMIC CARDIOMYOPATHY: Primary | Chronic | ICD-10-CM

## 2025-07-01 DIAGNOSIS — I49.9 VENTRICULAR ARRHYTHMIA: Chronic | ICD-10-CM

## 2025-07-01 DIAGNOSIS — Z95.810 CARDIAC DEFIBRILLATOR IN PLACE: ICD-10-CM

## 2025-07-01 DIAGNOSIS — Z79.899 LONG TERM CURRENT USE OF ANTIARRHYTHMIC MEDICAL THERAPY: ICD-10-CM

## 2025-07-01 RX ORDER — METOPROLOL SUCCINATE 50 MG/1
50 TABLET, EXTENDED RELEASE ORAL DAILY
COMMUNITY
Start: 2025-03-25

## 2025-07-01 RX ORDER — SACUBITRIL AND VALSARTAN 49; 51 MG/1; MG/1
1 TABLET, FILM COATED ORAL 2 TIMES DAILY
COMMUNITY
Start: 2025-03-04

## 2025-07-01 NOTE — PROGRESS NOTES
Cardiac Electrophysiology Outpatient Note  [facility]    Office Visit     David Ravi  [id number]  05/28/2024    Primary Care Physician: Jayant Newton MD    Referred By: No ref. provider found    Subjective     Chief Complaint   Patient presents with    Cardiac defibrillator in place     UPDATED PROBLEM LIST:  Nonischemic cardiomyopathy complicated by ventricular tachycardia   Left heart catheterization, circa 2000 by Rohan Bautista MD, showed normal coronary arteries.  Echocardiogram, 08/20/2015, showed moderate global hypokinesis, ejection fraction of 25% to 30%.  GXT myocardial perfusion, 08/25/2015, showed dilated  LV; no reversible ischemia.   Left heart catheterization, 09/08/2015, Dr. Rohan Bautista: Normal coronary arteries, EF 30%. Placement of LifeVest.  [facility], 10/19/21, normal coronaries. EF 30-35% with severe global hypokinesis. Initiated on jardiance.   Status post [employer] single chamber ICD implantation, November 2015.   TTE 10/2023: LVEF 43% no VHD  Cardiac MRI 3/2024: LVEF 42% with regional akinesis in the basal inferolateral wall, no LGE visualized but LV scar assessment suboptimal due to cardiac device  VT RFA by Dr. Parra 3/27/24  TTE 4/28/2024: LVEF 26-30%, Mild to moderate MR, no significant change from 10/2023 study  Tikosyn initiation 4/5/24, then mexiletine 4/30/2024  Obesity, BMI 35.   Obstructive sleep apnea with CPAP.   Asthma.   GERD.   History of vertigo.    History of Present Illness:   David Ravi is a 55 y.o. male who presents to my electrophysiology clinic for follow up of nonischemic cardiomyopathy and ventricular tachycardia s/p ICD and VT ablation 3/2024 by Dr. Parra.  He did have some recurrences afterwards and is now on Tikosyn and mexiletine.  He is referred to [facility] advanced heart failure and is not currently a candidate for advanced therapies.  He underwent a CPET with max VO2 of 17.    He is overall done  well since his last visit in December 2024.  No significant problems.  He has some shortness of breath on exertion when walking to the mailbox for example but this is uphill.  He does most of his ADLs without any issue.  Does report some fatigue.  Denies any chest pain, shortness of breath at rest, palpitations, near/full syncope, or edema.      Past Medical History:   Diagnosis Date    Abnormal ECG 2024    Arrhythmia     Asthma     CHF NYHA class I     Coronary artery disease 2000    GERD (gastroesophageal reflux disease)     Heart disease     History of vertigo     Hyperlipidemia     Nonischemic cardiomyopathy     Obesity     IRINA on CPAP     Ventricular arrhythmia 01/30/2024       Past Surgical History:   Procedure Laterality Date    ABLATION OF DYSRHYTHMIC FOCUS      CARDIAC CATHETERIZATION      CARDIAC CATHETERIZATION Left 10/19/2021    Procedure: Left Heart Cath;  Surgeon: Shankar Sanchez MD;  Location:  [facility];  Service: Cardiovascular;  Laterality: Left;    CARDIAC DEFIBRILLATOR PLACEMENT  2015    CARDIAC ELECTROPHYSIOLOGY PROCEDURE N/A 03/27/2024    Procedure: Ablation VT--DNS meds, schedule after cardiac MRI;  Surgeon: Robert Parra MD;  Location:  [facility];  Service: Cardiovascular;  Laterality: N/A;    COLONOSCOPY         Family History   Problem Relation Age of Onset    Colon cancer Mother     Heart disease Father     Heart attack Father     No Known Problems Sister     Alcohol abuse Paternal Uncle     Alcohol abuse Maternal Grandfather     Alzheimer's disease Paternal Grandmother        Social History     Socioeconomic History    Marital status:    Tobacco Use    Smoking status: Never     Passive exposure: Past    Smokeless tobacco: Never   Vaping Use    Vaping status: Never Used    Passive vaping exposure: Yes   Substance and Sexual Activity    Alcohol use: No    Drug use: No    Sexual activity: Not Currently     Partners: Female         Current  "Outpatient Medications:     Acetaminophen (TYLENOL ARTHRITIS PAIN PO), Take 2 tablets by mouth As Needed (ARTHRITIS)., Disp: , Rfl:     albuterol sulfate  (90 Base) MCG/ACT inhaler, Inhale 2 puffs Every 4 (Four) Hours As Needed for Shortness of Air or Wheezing., Disp: , Rfl:     azelastine (ASTEPRO) 0.15 % solution nasal spray, Administer 2 sprays into the nostril(s) as directed by provider Daily As Needed for Allergies., Disp: , Rfl:     dapagliflozin Propanediol 10 MG tablet, Take 5 mg by mouth Daily., Disp: , Rfl:     dofetilide (TIKOSYN) 500 MCG capsule, Take 1 capsule by mouth Every 12 (Twelve) Hours., Disp: 180 capsule, Rfl: 3    Entresto 49-51 MG tablet, Take 1 tablet by mouth 2 (Two) Times a Day., Disp: , Rfl:     levocetirizine (XYZAL) 5 MG tablet, Take 1 tablet by mouth Every Evening., Disp: , Rfl:     Magnesium Oxide -Mg Supplement 400 (240 Mg) MG tablet, Take 1 tablet by mouth Daily., Disp: , Rfl:     melatonin 3 MG tablet, Take 1 tablet by mouth Daily., Disp: , Rfl:     metoprolol succinate XL (TOPROL-XL) 50 MG 24 hr tablet, Take 1 tablet by mouth Daily., Disp: , Rfl:     mexiletine (MEXITIL) 200 MG capsule, Take 1 capsule by mouth 3 (Three) Times a Day., Disp: 270 capsule, Rfl: 3    montelukast (SINGULAIR) 10 MG tablet, Take 1 tablet by mouth Every Night., Disp: , Rfl:     omeprazole (priLOSEC) 20 MG capsule, Take 1 capsule by mouth As Needed (HEARTBURN)., Disp: , Rfl:     spironolactone (ALDACTONE) 25 MG tablet, , Disp: , Rfl:     Allergies:   Allergies   Allergen Reactions    Amoxicillin Anaphylaxis     difficulting breathing       Objective   Vital Signs: Blood pressure 116/82, pulse 72, height 180.3 cm (71\"), weight 111 kg (243 lb 14.4 oz), SpO2 95%.    PHYSICAL EXAM  General appearance: Awake, alert, cooperative  Head: Normocephalic, without obvious abnormality, atraumatic  Lungs: Clear to ascultation bilaterally  Heart: Regular rate and rhythm, no murmurs, no lower extremity " "swelling  Skin: Skin color, turgor normal, no rashes or lesions  Neurologic: Grossly normal     Lab Results   Component Value Date    GLUCOSE 94 05/01/2024    CALCIUM 9.2 05/01/2024     05/01/2024    K 3.7 05/01/2024    CO2 27.0 05/01/2024     05/01/2024    BUN 9 05/01/2024    CREATININE 0.83 05/01/2024    EGFRIFNONA 96 10/19/2021    BCR 10.8 05/01/2024    ANIONGAP 11.0 05/01/2024     Lab Results   Component Value Date    WBC 8.92 05/01/2024    HGB 17.1 05/01/2024    HCT 52.0 (H) 05/01/2024    MCV 88.4 05/01/2024     05/01/2024     No results found for: \"INR\", \"PROTIME\"  Lab Results   Component Value Date    TSH 1.360 04/26/2024          Results for orders placed during the hospital encounter of 04/26/24    Adult Transthoracic Echo Complete w/ Color, Spectral and Contrast if Necessary Per Protocol 04/28/2024  3:04 PM    Interpretation Summary    Left ventricular systolic function is severely decreased.  Estimated left ventricular ejection fraction appears to be 26 - 30% with Lumason contrast.    The left ventricular cavity is moderately dilated.    Left ventricular wall thickness is consistent with mild concentric hypertrophy.    The right atrial cavity is mild to moderately dilated.    Mild to moderate mitral valve regurgitation is present.    When compared to the echocardiogram performed in October 2023, there is no significant change.  The calculated EF on both ultrasounds are approximately the same, but visually, the ejection fraction is estimated to be notably lower.     Results for orders placed during the hospital encounter of 10/19/21    Cardiac Catheterization/Vascular Study 10/19/2021  8:33 AM    Narrative  Table formatting from the original result was not included.  Images from the original result were not included.  [facility] AT [address], [address]  [place], [place], [address] [phone]  [email or web address] [email or web address] [facility]          CARDIAC CATHETERIZATION " PROCEDURE NOTE    Impression  · Normal coronary arteries.  · Nonischemic cardiomyopathy, LVEF 30-35% with severe global hypokinesis.  · LVEDP 14 mmHg.    RECOMMENDATIONS:  · Continue guideline directed medical therapy with carvedilol and Entresto  · Start Jardiance 10 mg daily  · Consider adding spironolactone in the near future if blood pressure will tolerate.    ----------------------------------------------------------------------------------------------------------------------    Indication(s) for this Procedure:  Ventricular fibrillation with known cardiomyopathy.    Procedure(s) Performed:  1.  Right radial artery access .  2. Selective coronary angiography  3. Left heart catheterization.  4. Left ventriculography.    Description of the Procedure:  Informed consent was obtained with the goals, rationale, alternatives, risks and benefits of the procedure explained to the patient.  A 6Fr Terumo slender sheath was placed in the right radial artery. Selective angiography of the right coronary artery was performed with a 5Fr JR4 diagnostic catheter.  Selective angiography of the left coronary arteries was performed with a 5Fr JL3.5 diagnostic catheter.  Left heart catheterization with left ventriculography was performed with a 5Fr pigtail catheter placed in the left ventricle.  The procedure was completed and the sheath was removed. A radial patent hemostasis band was placed for hemostasis.      Angiographic Findings:  Right coronary dominant circulation  · Left main artery:   Large-caliber vessel trifurcates into an LAD, ramus, and circumflex, normal.  · Left anterior descending artery: Large caliber vessel gives rise to several small diagonals, normal.  · Left circumflex artery: Large-caliber nondominant vessel gives rise to one moderate OM branch, then continues in the AV groove to supply several small posterior lateral branches, normal  · Ramus intermedius: Large-caliber, normal.  · Right coronary artery:  Large-caliber dominant, gives rise to PDA to PLV, normal.    Left Ventricle: LVEF 30-35% with severe global hypokinesis.    Hemodynamic Findings:  · Ao pressure: 81/56 mmHg  · LVEDP: 14 mmHg  · LV to Ao pullback peak to peak gradient: 0 mmHg    Estimated Blood Loss: Minimal    Specimen(s): None obtained    Sheath: Removed, radial patent hemostasis band was placed for hemostasis.    Complications: There were no apparent early complications.    Shankar Sanchez MD, [facility]  Interventional Cardiology  [facility] personally viewed and interpreted the patient's EKG/Telemetry/lab data      ECG 12 Lead    Date/Time: 7/9/2025 2:44 PM  Performed by: Barrett Ames PA-C    Authorized by: Barrett Ames PA-C  Comparison: compared with previous ECG from 12/17/2024  Similar to previous ECG  Rhythm: sinus rhythm  BPM: 66    Clinical impression: normal ECG          David Ravi  reports that he has never smoked. He has been exposed to tobacco smoke. He has never used smokeless tobacco.          Advance Care Planning   Advance Care Planning: ACP discussion was declined by the patient. Patient does not have an advance directive, declines further assistance.     Assessment & Plan    1. Nonischemic cardiomyopathy  -S/p single chamber ICD, BSC   -Follows with Dr. Newton for heart failure.    -NYHA class I-II symptoms, on exam  -GDMT: Empagliflozin 5 daily, Entresto 49-51 twice daily, Toprol-XL 50, and Aldactone 25 daily  -TTE 4/2024 with LVEF 26-30%.   -Cardiac MRI from March '24 showed no LGE but the report was admittedly suboptimal for assessment of LV scar.  EF of this was 42%.  -Was also seen by advanced heart failure [facility].  CPET showed VO2 max of 17. Currently he is not a candidate for advanced therapies, but should his arrhythmias persist and be unable to be controlled this may be needed.    2. Ventricular arrhythmia  Status post ablation in February 2024.  This consisted of mostly  substrate modification in the basal lateral left ventricle.  He did have some recurrences of VT and is now on Tikosyn and mexiletine without any recurrence. we will continue to monitor closely. May have to consider repeat ablation if he has recurrent VT.    3. ICD (implantable cardioverter-defibrillator) discharge  The patient single-chamber [employer] ICD was interrogated today demonstrating normal threshold and impedance values, 6.5 years left on the battery, <1% ventricular pacing at rate 40, and most importantly no events or reprogramming.    4. Longterm Current Use of Antiarrhythmic Medical Therapy  EKG acceptable Qtc today. Continue Tikosyn    Follow Up:  Return in about 6 months (around 1/1/2026).      Thank you for allowing me to participate in the care of your patient. Please do not hesitate to contact me with additional questions or concerns.      Barrett Ames PA-C  Cardiac Electrophysiology  [facility] / [facility]

## 2025-08-14 LAB
MC_CV_MDC_IDC_RATE_1: 160
MC_CV_MDC_IDC_RATE_1: 220
MC_CV_MDC_IDC_SHOCK_MEASURED_IMPEDANCE: 70
MC_CV_MDC_IDC_THERAPIES: NORMAL
MC_CV_MDC_IDC_THERAPIES: NORMAL
MC_CV_MDC_IDC_ZONE_ID: 1
MC_CV_MDC_IDC_ZONE_ID: 2
MDC_IDC_MSMT_BATTERY_REMAINING_LONGEVITY: 78 MO
MDC_IDC_MSMT_BATTERY_REMAINING_PERCENTAGE: 71 %
MDC_IDC_MSMT_BATTERY_STATUS: NORMAL
MDC_IDC_MSMT_CAP_CHARGE_TIME: 12.2
MDC_IDC_MSMT_LEADCHNL_RV_IMPEDANCE_VALUE: 495
MDC_IDC_MSMT_LEADCHNL_RV_PACING_THRESHOLD_POLARITY: NORMAL
MDC_IDC_MSMT_LEADCHNL_RV_SENSING_INTR_AMPL: 10.7
MDC_IDC_PG_IMPLANT_DTM: NORMAL
MDC_IDC_PG_MFG: NORMAL
MDC_IDC_PG_MODEL: NORMAL
MDC_IDC_PG_SERIAL: NORMAL
MDC_IDC_PG_TYPE: NORMAL
MDC_IDC_SESS_DTM: NORMAL
MDC_IDC_SESS_TYPE: NORMAL
MDC_IDC_SET_BRADY_LOWRATE: 40
MDC_IDC_SET_BRADY_MODE: NORMAL
MDC_IDC_SET_LEADCHNL_RV_PACING_AMPLITUDE: 2
MDC_IDC_SET_LEADCHNL_RV_PACING_POLARITY: NORMAL
MDC_IDC_SET_LEADCHNL_RV_PACING_PULSEWIDTH: 0.5
MDC_IDC_SET_LEADCHNL_RV_SENSING_POLARITY: NORMAL
MDC_IDC_SET_LEADCHNL_RV_SENSING_SENSITIVITY: 0.6
MDC_IDC_SET_ZONE_STATUS: NORMAL
MDC_IDC_SET_ZONE_STATUS: NORMAL
MDC_IDC_SET_ZONE_TYPE: NORMAL
MDC_IDC_SET_ZONE_TYPE: NORMAL
MDC_IDC_STAT_BRADY_RV_PERCENT_PACED: 0
MDC_IDC_STAT_TACHYTHERAPY_ATP_DELIVERED_RECENT: 0
MDC_IDC_STAT_TACHYTHERAPY_SHOCKS_ABORTED_RECENT: 0
MDC_IDC_STAT_TACHYTHERAPY_SHOCKS_DELIVERED_RECENT: 0

## (undated) DEVICE — ADULT NASAL CO2 SAMPLING WITH O2 DELIVERY CANNULA FOR CAPNOFLEX MODULE: Brand: VITAL SIGNS™

## (undated) DEVICE — ADULT, W/LG. BACK PAD, RADIOTRANSPARENT ELEMENT AND LEAD WIRE COMPATIBLE W/: Brand: DEFIBRILLATION ELECTRODES

## (undated) DEVICE — GW CORNRY QUIKCAS

## (undated) DEVICE — CATH ADVISOR HD GRID MAP SENSR ENABLED

## (undated) DEVICE — CVR TRANSD FLX 3DIMEN 14X29.2CM LF STRL

## (undated) DEVICE — KT MANIFOLD CATHLAB CUST

## (undated) DEVICE — SYS CLS VASC/VENI VASCADE MVP 6TO12F

## (undated) DEVICE — MODEL BT2000 P/N 700287-012KIT CONTENTS: MANIFOLD WITH SALINE AND CONTRAST PORTS, SALINE TUBING WITH SPIKE AND HAND SYRINGE, TRANSDUCER: Brand: BT2000 AUTOMATED MANIFOLD KIT

## (undated) DEVICE — PINNACLE INTRODUCER SHEATH: Brand: PINNACLE

## (undated) DEVICE — KT ELECTRD EP SURF ENSITE/X ADHS/PTCH 1P/U NS

## (undated) DEVICE — KT DISP ARRHYTHMIA VMAP 91200008 DISP

## (undated) DEVICE — CATH ULTRASND ECHOCARDIOGRAPHY ACUNAV

## (undated) DEVICE — CATH DIAG EXPO M/ PK 5F FL4/FR4 PIG

## (undated) DEVICE — CATH DIAG EXPO .045 FL3  5F 100CM

## (undated) DEVICE — PAD GRND REM POLYHESIVE A/ DISP

## (undated) DEVICE — ST INF PRI SMRTSTE 20DRP 2VLV 24ML 117

## (undated) DEVICE — Device: Brand: CELSIUS RMT THERMOCOOL

## (undated) DEVICE — INTRO STEER AGILIS NXT LG/CURL 8.5F71CM

## (undated) DEVICE — PK CATH CARD 10

## (undated) DEVICE — Device: Brand: WEBSTER CS

## (undated) DEVICE — 1 X VERSACROSS TRANSSEPTAL SHEATH (INCLUDING  1 X J-TIP GUIDEWIRE); 1 X VERSACROSS RF WIRE (INCLUDING 1 X CONNECTOR CABLE (SINGLE USE)); 1 X DISPERSIVE ELECTRODE: Brand: VERSACROSS ACCESS SOLUTION

## (undated) DEVICE — LEX ELECTRO PHYSIOLOGY: Brand: MEDLINE INDUSTRIES, INC.

## (undated) DEVICE — CATH EP SUPREME QUADPOLAR CRD2 5F 5MM 120CM

## (undated) DEVICE — GW PERIPH GUIDERIGHT STD/EXCHNG/J/TIP SS 0.035IN 5X260CM

## (undated) DEVICE — GLIDESHEATH SLENDER STAINLESS STEEL KIT: Brand: GLIDESHEATH SLENDER

## (undated) DEVICE — DEV COMP RAD PRELUDESYNC 24CM